# Patient Record
Sex: MALE | Race: WHITE | NOT HISPANIC OR LATINO | Employment: OTHER | URBAN - METROPOLITAN AREA
[De-identification: names, ages, dates, MRNs, and addresses within clinical notes are randomized per-mention and may not be internally consistent; named-entity substitution may affect disease eponyms.]

---

## 2019-03-15 ENCOUNTER — OFFICE VISIT (OUTPATIENT)
Dept: PODIATRY | Facility: CLINIC | Age: 69
End: 2019-03-15
Payer: MEDICARE

## 2019-03-15 VITALS — DIASTOLIC BLOOD PRESSURE: 82 MMHG | HEART RATE: 58 BPM | SYSTOLIC BLOOD PRESSURE: 142 MMHG

## 2019-03-15 DIAGNOSIS — M79.672 FOOT PAIN, LEFT: Primary | ICD-10-CM

## 2019-03-15 DIAGNOSIS — M72.2 PLANTAR FASCIITIS: ICD-10-CM

## 2019-03-15 PROCEDURE — 73630 PR  X-RAY FOOT 3+ VW: ICD-10-PCS | Mod: LT,,, | Performed by: PODIATRIST

## 2019-03-15 PROCEDURE — 73630 X-RAY EXAM OF FOOT: CPT | Mod: LT,,, | Performed by: PODIATRIST

## 2019-03-15 PROCEDURE — 20550 NJX 1 TENDON SHEATH/LIGAMENT: CPT | Mod: LT,,, | Performed by: PODIATRIST

## 2019-03-15 PROCEDURE — 99203 PR OFFICE/OUTPT VISIT, NEW, LEVL III, 30-44 MIN: ICD-10-PCS | Mod: 25,,, | Performed by: PODIATRIST

## 2019-03-15 PROCEDURE — 99203 OFFICE O/P NEW LOW 30 MIN: CPT | Mod: 25,,, | Performed by: PODIATRIST

## 2019-03-15 PROCEDURE — 20550 PR INJECT TENDON SHEATH/LIGAMENT: ICD-10-PCS | Mod: LT,,, | Performed by: PODIATRIST

## 2019-03-15 RX ORDER — ROSUVASTATIN CALCIUM 5 MG/1
5 TABLET, COATED ORAL DAILY
COMMUNITY
End: 2020-02-13 | Stop reason: SDUPTHER

## 2019-03-15 RX ORDER — BUPIVACAINE HYDROCHLORIDE 5 MG/ML
1.5 INJECTION, SOLUTION PERINEURAL
Status: COMPLETED | OUTPATIENT
Start: 2019-03-15 | End: 2019-03-15

## 2019-03-15 RX ORDER — METHYLPREDNISOLONE ACETATE 40 MG/ML
40 INJECTION, SUSPENSION INTRA-ARTICULAR; INTRALESIONAL; INTRAMUSCULAR; SOFT TISSUE
Status: COMPLETED | OUTPATIENT
Start: 2019-03-15 | End: 2019-03-15

## 2019-03-15 RX ORDER — ATORVASTATIN CALCIUM 10 MG/1
10 TABLET, FILM COATED ORAL DAILY
COMMUNITY
End: 2020-02-13

## 2019-03-15 RX ORDER — NAPROXEN 500 MG/1
500 TABLET ORAL 2 TIMES DAILY
Qty: 60 TABLET | Refills: 1 | Status: SHIPPED | OUTPATIENT
Start: 2019-03-15 | End: 2019-04-14

## 2019-03-15 RX ORDER — DEXAMETHASONE SODIUM PHOSPHATE 4 MG/ML
4 INJECTION, SOLUTION INTRA-ARTICULAR; INTRALESIONAL; INTRAMUSCULAR; INTRAVENOUS; SOFT TISSUE
Status: COMPLETED | OUTPATIENT
Start: 2019-03-15 | End: 2019-03-15

## 2019-03-15 RX ADMIN — BUPIVACAINE HYDROCHLORIDE 7.5 MG: 5 INJECTION, SOLUTION PERINEURAL at 05:03

## 2019-03-15 RX ADMIN — METHYLPREDNISOLONE ACETATE 40 MG: 40 INJECTION, SUSPENSION INTRA-ARTICULAR; INTRALESIONAL; INTRAMUSCULAR; SOFT TISSUE at 05:03

## 2019-03-15 RX ADMIN — DEXAMETHASONE SODIUM PHOSPHATE 4 MG: 4 INJECTION, SOLUTION INTRA-ARTICULAR; INTRALESIONAL; INTRAMUSCULAR; INTRAVENOUS; SOFT TISSUE at 05:03

## 2019-03-15 NOTE — PROGRESS NOTES
[address] [facility] [address]  [place] [address]  Phone: [phone]   [phone]    Patient's PCP:Primary Doctor No  Referring Provider: No ref. provider found    Subjective:      Chief Complaint:: Foot Pain (left) and Heel Pain (left)    ANDREA Canchola is a 68 y.o. male who presents with a complaint of left foot/heel pain lasting for about 10 years but off and on. Current symptoms include aching and pain.  Aggravating factors are weight bearing. Treatment to date have included injections that gave relief, but now pain is back and hurts. Patients rates pain 8/10 on pain scale.    Pt thinks PF.        Vitals:    03/15/19 1531   BP: (!) 142/82   Pulse: (!) 58     Shoe Size: 11    Past Surgical History:   Procedure Laterality Date    CHOLECYSTECTOMY      TONSILLECTOMY      UMBILICAL HERNIA REPAIR       Past Medical History:   Diagnosis Date    Depression     Hyperthyroidism     Hypothyroidism      History reviewed. No pertinent family history.     Social History:   Marital Status:   Alcohol History:  has no alcohol history on file.  Tobacco History:  reports that he has quit smoking. He does not have any smokeless tobacco history on file.  Drug History:  has no drug history on file.    Review of patient's allergies indicates:  No Known Allergies    Current Outpatient Medications   Medication Sig Dispense Refill    atorvastatin (LIPITOR) 10 MG tablet Take 10 mg by mouth once daily.      rosuvastatin (CRESTOR) 5 MG tablet Take 5 mg by mouth once daily.      naproxen (NAPROSYN) 500 MG tablet Take 1 tablet (500 mg total) by mouth 2 (two) times daily. 60 tablet 1     Current Facility-Administered Medications   Medication Dose Route Frequency Provider Last Rate Last Dose    bupivacaine 0.5% (5 mg/ml) injection 7.5 mg  1.5 mL Infiltration 1 time in Clinic/HOD Zackary Boyer DPM        dexamethasone injection 4 mg  4 mg Other 1 time in Clinic/HOD Zackary Boyer DPM         methylPREDNISolone acetate injection 40 mg  40 mg Other 1 time in Clinic/HOD Zackary Boyer DPM           Review of Systems      Objective:        Physical Exam:   Foot Exam    General  General Appearance: appears stated age and healthy   Orientation: alert and oriented to person, place, and time   Affect: appropriate   Gait: unimpaired       Left Foot/Ankle      Inspection and Palpation  Ecchymosis: none  Tenderness: plantar fascia   Swelling: none   Arch: pes cavus  Skin Exam: skin intact;     Neurovascular  Dorsalis pedis: 2+  Posterior tibial: 2+  Saphenous nerve sensation: normal  Tibial nerve sensation: normal  Superficial peroneal nerve sensation: normal  Deep peroneal nerve sensation: normal  Sural nerve sensation: normal    Muscle Strength  Ankle dorsiflexion: 5  Ankle plantar flexion: 5  Ankle inversion: 5  Ankle eversion: 5  Great toe extension: 5  Great toe flexion: 5    Range of Motion    Passive  Ankle dorsiflexion: 5, pain    Active  Ankle dorsiflexion: 5, pain    Tests  Calcaneal squeeze: negative     Comments  Pain on palpation of the infeior medial heel. No pain present  with side to side compression of the calcaneus. Negative tinnel's sign  at the tarsal tunnel. Negative Loivera's nerve pain. Negative Calcaneal nerve pain. No soft tissue masses. Pain present with dorsiflexion of the ankle. No edema, erythema, or ecchymosis noted.     Physical Exam   Cardiovascular:   Pulses:       Dorsalis pedis pulses are 2+ on the left side.        Posterior tibial pulses are 2+ on the left side.   Musculoskeletal:        Feet:        Imaging:   X-Ray Foot Complete Left  AP, lateral, lateral oblique, medial oblique weightbearing x-rays left foot:  No fractures, no bone tumors, no soft tissue masses. Inferior and posterior calcaneal exostosis present. Hammertoe deformities 2, 3, 4, 5. Pes cavus foot structure.           Assessment:       1. Foot pain, left    2. Plantar fasciitis - Left Foot      Plan:   Foot  pain, left  -     X-Ray Foot Complete Left  -     naproxen (NAPROSYN) 500 MG tablet; Take 1 tablet (500 mg total) by mouth 2 (two) times daily.  Dispense: 60 tablet; Refill: 1  -     methylPREDNISolone acetate injection 40 mg  -     bupivacaine 0.5% (5 mg/ml) injection 7.5 mg  -     dexamethasone injection 4 mg    Plantar fasciitis - Left Foot  -     naproxen (NAPROSYN) 500 MG tablet; Take 1 tablet (500 mg total) by mouth 2 (two) times daily.  Dispense: 60 tablet; Refill: 1  -     methylPREDNISolone acetate injection 40 mg  -     bupivacaine 0.5% (5 mg/ml) injection 7.5 mg  -     dexamethasone injection 4 mg    Plantar Fasciitis Level I Treatment:   Anti-inflammatory  No bare feet  Over the counter insert  Restrict activity level   Use running shoes at full time  No impact exercise and stretch gastrocnemius muscle  Follow-up if symptoms worsen or fail to improve.    Procedures - A steroid injection was performed at the attachment of the medial central band of the plantar fascia to the medial tubercle of the left calcaneus from a medial approach. using 0.5% plain Marcaine and 4 mg of Decadron phosphate and 40 mg of methylprednisolone. This was well tolerated.    Counseling:     I provided patient education verbally regarding:   Patient diagnosis, treatment options, as well as alternatives, risks, and benefits.   Discussed different treatment options for heel pain. I gave written and verbal instructions on heel cord stretching and this was demonstrated for the patient. Patient expressed understanding. Discussed wearing appropriate shoe gear and avoiding flats, slippers, sandals, barefoot, and sockfeet. Recommended arch supports. My recommendation for OTC supports is Spenco polysorb replacement insoles or patient may elect more aggressive treatment with prescription arch supports. We also discussed cortisone injections and NSAID therapy.   This note was created using Dragon voice recognition software that occasionally  misinterpreted phrases or words.

## 2019-03-15 NOTE — PATIENT INSTRUCTIONS
Plantar Fasciitis  Plantar fasciitis is a painful swelling of the plantar fascia. The plantar fascia is a thick, fibrous layer of tissue. It covers the bones on the bottom of your foot. And it supports the foot bones in an arched position.  This can happen gradually or suddenly. It usually affects one foot at a time. Heel pain can be sharp, like a knife sticking into the bottom of your foot. You may feel pain after exercising, long-distance jogging, stair climbing, long periods of standing, or after standing up.  Risk factors include: non-active lifestyle, arthritis, diabetes, obesity or recent weight gain, flat foot, high arch. Wearing high heels, loose shoes, or shoes with poor arch support for long periods of time adds to the risk. This problem is commonly found in runners and dancers. It also found in people who stand on hard surfaces for long periods of time.  Foot pain from this condition is usually worse in the morning. But it improves with walking. By the end of the day there may be a dull aching. Treatment requires short-term rest and controlling swelling. It may take up to 9 months before all symptoms go away. Rarely, a steroid injection into the foot, or surgery, may be needed.  Home care  · If you are overweight, lose weight to help healing.  · Choose supportive shoes with good arch support and shock absorbency. Replace athletic shoes when they become worn out. Dont walk or run barefoot.  · Premade or custom-fitted shoe inserts may be helpful. Inserts made of silicone seem to be the most effective. Custom-made inserts can be provided by a podiatrist or foot specialist, physical therapist, or orthopedist.  · Premade or custom-made night splints keep the heel stretched out while you sleep. They may prevent morning pain.  · Avoid activities that stress the feet: jogging, prolonged standing or walking, contact sports, etc.  · First thing in the morning and before sports, stretch the bottom of your feet.  Gently flex your ankle so the toes move toward your knee.  · Icing may help control heel pain. Apply an ice pack to the heel for 10-20 minutes as a preventive. Or ice your heel after a severe flare-up of symptoms. You may repeat this every 1-2 hours as needed.  · You may use over-the-counter pain medicine to control pain, unless another medicine was prescribed. Anti-inflammatory pain medicines, such as ibuprofen or naproxen, may work better than acetaminophen. If you have chronic liver or kidney disease or ever had a stomach ulcer or GI bleeding, talk with your healthcare provider before using these medicines.  Follow-up care  Follow up with your healthcare provider, physical therapist, or podiatrist or foot specialist as advised.  Call for an appointment if pain worsens or there is no relief after a few weeks of home treatment. Shoe inserts, a night splint, or a special boot may be required.  If X-rays were taken, you will be told of any new findings that may affect your care.  When to seek medical advice  Call your healthcare provider right away if any of these occur:  · Foot swelling  · Redness with increasing pain  Date Last Reviewed: 11/21/2015  © 0868-4867 [employer]. [address] [place] [address], [place] [address]. All rights reserved. This information is not intended as a substitute for professional medical care. Always follow your healthcare professional's instructions.

## 2020-02-13 ENCOUNTER — OFFICE VISIT (OUTPATIENT)
Dept: FAMILY MEDICINE | Facility: CLINIC | Age: 70
End: 2020-02-13
Payer: MEDICARE

## 2020-02-13 VITALS
HEART RATE: 56 BPM | DIASTOLIC BLOOD PRESSURE: 78 MMHG | SYSTOLIC BLOOD PRESSURE: 128 MMHG | BODY MASS INDEX: 40.93 KG/M2 | HEIGHT: 69 IN | WEIGHT: 276.38 LBS

## 2020-02-13 DIAGNOSIS — Z12.5 SCREENING FOR MALIGNANT NEOPLASM OF PROSTATE: ICD-10-CM

## 2020-02-13 DIAGNOSIS — E78.2 MIXED HYPERLIPIDEMIA: ICD-10-CM

## 2020-02-13 DIAGNOSIS — R79.89 LOW TESTOSTERONE LEVEL IN MALE: ICD-10-CM

## 2020-02-13 DIAGNOSIS — R21 RASH OF HANDS: ICD-10-CM

## 2020-02-13 DIAGNOSIS — E66.01 CLASS 3 SEVERE OBESITY WITH BODY MASS INDEX (BMI) OF 40.0 TO 44.9 IN ADULT, UNSPECIFIED OBESITY TYPE, UNSPECIFIED WHETHER SERIOUS COMORBIDITY PRESENT: ICD-10-CM

## 2020-02-13 DIAGNOSIS — M19.90 ARTHRITIS: ICD-10-CM

## 2020-02-13 DIAGNOSIS — I10 ESSENTIAL HYPERTENSION: Primary | ICD-10-CM

## 2020-02-13 DIAGNOSIS — F32.5 MAJOR DEPRESSIVE DISORDER IN FULL REMISSION, UNSPECIFIED WHETHER RECURRENT: ICD-10-CM

## 2020-02-13 PROBLEM — E66.813 CLASS 3 SEVERE OBESITY WITH BODY MASS INDEX (BMI) OF 40.0 TO 44.9 IN ADULT: Status: ACTIVE | Noted: 2020-02-13

## 2020-02-13 PROCEDURE — 99205 PR OFFICE/OUTPT VISIT, NEW, LEVL V, 60-74 MIN: ICD-10-PCS | Mod: S$GLB,,, | Performed by: NURSE PRACTITIONER

## 2020-02-13 PROCEDURE — 99205 OFFICE O/P NEW HI 60 MIN: CPT | Mod: S$GLB,,, | Performed by: NURSE PRACTITIONER

## 2020-02-13 RX ORDER — NAPROXEN 500 MG/1
500 TABLET ORAL 2 TIMES DAILY WITH MEALS
COMMUNITY
End: 2020-08-13 | Stop reason: SDUPTHER

## 2020-02-13 RX ORDER — SERTRALINE HYDROCHLORIDE 50 MG/1
50 TABLET, FILM COATED ORAL NIGHTLY
COMMUNITY
End: 2020-02-13 | Stop reason: SDUPTHER

## 2020-02-13 RX ORDER — HYDROGEN PEROXIDE 3 %
20 SOLUTION, NON-ORAL MISCELLANEOUS
COMMUNITY
End: 2022-02-18 | Stop reason: SDUPTHER

## 2020-02-13 RX ORDER — LISINOPRIL 40 MG/1
40 TABLET ORAL DAILY
COMMUNITY
End: 2020-02-13 | Stop reason: SDUPTHER

## 2020-02-13 RX ORDER — SERTRALINE HYDROCHLORIDE 50 MG/1
50 TABLET, FILM COATED ORAL NIGHTLY
Qty: 90 TABLET | Refills: 1 | Status: SHIPPED | OUTPATIENT
Start: 2020-02-13 | End: 2020-05-11 | Stop reason: SDUPTHER

## 2020-02-13 RX ORDER — MELOXICAM 15 MG/1
15 TABLET ORAL DAILY
Qty: 90 TABLET | Refills: 1 | Status: SHIPPED | OUTPATIENT
Start: 2020-02-13 | End: 2020-08-13 | Stop reason: SDUPTHER

## 2020-02-13 RX ORDER — ROSUVASTATIN CALCIUM 5 MG/1
5 TABLET, COATED ORAL DAILY
Qty: 90 TABLET | Refills: 1 | Status: SHIPPED | OUTPATIENT
Start: 2020-02-13 | End: 2020-02-14

## 2020-02-13 RX ORDER — LISINOPRIL 40 MG/1
40 TABLET ORAL DAILY
Qty: 90 TABLET | Refills: 1 | Status: SHIPPED | OUTPATIENT
Start: 2020-02-13 | End: 2020-08-13 | Stop reason: SDUPTHER

## 2020-02-13 RX ORDER — MELOXICAM 15 MG/1
15 TABLET ORAL DAILY
COMMUNITY
End: 2020-02-13 | Stop reason: SDUPTHER

## 2020-02-13 NOTE — PROGRESS NOTES
SUBJECTIVE:    Patient ID: Ciaran Canchola is a 69 y.o. male.    Chief Complaint: Establish Care (Brought bottles.....mlr)    Pt presents to establish new care. Has lived in the area off and on for several years but officially moved here from [place] about 9 months ago. His job required a lot of traveling. 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 History includes HTN, HLD, arthritis, GERD. Has also had some family issues with his daughter in the past in which he was placed of sertraline. States he is doing well with this. Has not had blood work completed in about one year. States he has had chronically low testosterone in the past. Was previously being treated for it but has not had treatment in over a year. Would like testosterone checked with routine labs today. Recently saw Dr. Boyer where he was diagnosed with plantar fasciitis. Had colonoscopy 2 years ago in [place]. Cannot remember the name of the doctor but states he does have the report at home. RTC is supposed to be 5 years as he had some polyps removed. Takes otc nexium daily and feels it works well for him. Reports he has a peeling skin/rash on both his hands that has been coming and going for about the last 6 months. Saw a Dermatology NP in [place] and was given a cream he is unsure the name of. Cream was very expensive so he called back to see if there were any other options but never heard back from the office. He has been using aquafor to keep his palms well-moisturized but rash continues. States he takes otc allergy medication daily and has not had any sinus issues.  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 States he has not had the healthiest diet over the years d/t traveling frequently and having to eat out a lot.       No visits with results within 6 Month(s) from this visit.   Latest known visit with results is:   No results found for any previous visit.       Past Medical  History:   Diagnosis Date    Depression     Hyperlipidemia      Past Surgical History:   Procedure Laterality Date    CHOLECYSTECTOMY      COLONOSCOPY  2018    RTC in 5 years. done in [place]    TONSILLECTOMY      UMBILICAL HERNIA REPAIR       Family History   Problem Relation Age of Onset    ALS Mother     Heart disease Father     Heart disease Sister        Marital Status:   Alcohol History:  reports that he drinks alcohol.  Tobacco History:  reports that he quit smoking about 35 years ago. His smoking use included cigarettes. He has never used smokeless tobacco.  Drug History:  reports that he does not use drugs.    Review of patient's allergies indicates:  No Known Allergies    Current Outpatient Medications:     esomeprazole (NEXIUM) 20 MG capsule, Take 20 mg by mouth before breakfast., Disp: , Rfl:     lisinopril (PRINIVIL,ZESTRIL) 40 MG tablet, Take 1 tablet (40 mg total) by mouth once daily., Disp: 90 tablet, Rfl: 1    meloxicam (MOBIC) 15 MG tablet, Take 1 tablet (15 mg total) by mouth once daily., Disp: 90 tablet, Rfl: 1    naproxen (NAPROSYN) 500 MG tablet, Take 500 mg by mouth 2 (two) times daily with meals., Disp: , Rfl:     rosuvastatin (CRESTOR) 5 MG tablet, Take 1 tablet (5 mg total) by mouth once daily., Disp: 90 tablet, Rfl: 1    sertraline (ZOLOFT) 50 MG tablet, Take 1 tablet (50 mg total) by mouth every evening., Disp: 90 tablet, Rfl: 1    Review of Systems   Constitutional: Negative.    HENT: Negative for congestion, ear pain, sinus pressure, tinnitus and trouble swallowing.    Eyes: Negative for pain and redness.   Respiratory: Negative for cough, chest tightness and shortness of breath.    Cardiovascular: Negative for chest pain and palpitations.   Gastrointestinal: Negative for abdominal pain and nausea.   Genitourinary: Negative for dysuria, frequency and urgency.   Musculoskeletal: Positive for arthralgias (occasional). Negative for back pain and myalgias.   Skin:  "Positive for rash (peeling skin on hands). Negative for wound.   Allergic/Immunologic: Negative.    Neurological: Positive for headaches. Negative for dizziness, weakness and light-headedness.   Psychiatric/Behavioral: Negative.           Objective:      Vitals:    02/13/20 1409   BP: 128/78   Pulse: (!) 56   Weight: 125.4 kg (276 lb 6.4 oz)   Height: 5' 9" (1.753 m)     Body mass index is 40.82 kg/m².  Physical Exam   Constitutional: He is oriented to person, place, and time. He appears well-developed and well-nourished. No distress.   Overweight male. Appears age   HENT:   Head: Normocephalic and atraumatic.   Right Ear: Tympanic membrane normal.   Left Ear: Tympanic membrane normal.   Nose: Nose normal. No mucosal edema or nasal deformity.   Mouth/Throat: Oropharynx is clear and moist and mucous membranes are normal. No dental abscesses or dental caries.   Eyes: Pupils are equal, round, and reactive to light. Conjunctivae, EOM and lids are normal.   Neck: Normal range of motion. No tracheal tenderness present. No thyromegaly present.   Cardiovascular: Normal rate, regular rhythm and normal heart sounds.   No murmur heard.  Pulmonary/Chest: Effort normal and breath sounds normal. No accessory muscle usage. No respiratory distress. He has no rhonchi.   Abdominal: Soft. Normal appearance and bowel sounds are normal. There is no tenderness.   Musculoskeletal: Normal range of motion. He exhibits no tenderness.   Neurological: He is alert and oriented to person, place, and time.   Skin: Skin is warm and dry. Capillary refill takes less than 2 seconds. Rash (dry/cracked skin covering palms of both hands. Some areas of skin splitting.) noted. No bruising and no ecchymosis noted. No erythema.   Psychiatric: He has a normal mood and affect. Cognition and memory are normal.   Vitals reviewed.        Assessment:       1. Essential hypertension    2. Arthritis    3. Mixed hyperlipidemia    4. Screening for malignant neoplasm " of prostate    5. Major depressive disorder in full remission, unspecified whether recurrent    6. Rash of hands    7. Low testosterone level in male    8. Class 3 severe obesity with body mass index (BMI) of 40.0 to 44.9 in adult, unspecified obesity type, unspecified whether serious comorbidity present         Plan:       Essential hypertension  -     CBC auto differential; Future; Expected date: 02/13/2020  -     Comprehensive metabolic panel; Future; Expected date: 02/13/2020  -     Urinalysis Microscopic; Future  -     lisinopril (PRINIVIL,ZESTRIL) 40 MG tablet; Take 1 tablet (40 mg total) by mouth once daily.  Dispense: 90 tablet; Refill: 1  - Bp well-controlled at his time.    Arthritis  -     meloxicam (MOBIC) 15 MG tablet; Take 1 tablet (15 mg total) by mouth once daily.  Dispense: 90 tablet; Refill: 1    Mixed hyperlipidemia  -     Lipid panel; Future; Expected date: 02/13/2020  -     TSH; Future; Expected date: 02/13/2020  -     rosuvastatin (CRESTOR) 5 MG tablet; Take 1 tablet (5 mg total) by mouth once daily.  Dispense: 90 tablet; Refill: 1    Screening for malignant neoplasm of prostate  -     PSA, Screening; Future; Expected date: 02/13/2020    Major depressive disorder in full remission, unspecified whether recurrent  -     sertraline (ZOLOFT) 50 MG tablet; Take 1 tablet (50 mg total) by mouth every evening.  Dispense: 90 tablet; Refill: 1    Rash of hands  -     Ambulatory referral/consult to Dermatology; Future; Expected date: 02/20/2020    Low testosterone level in male  -     Testosterone, Total, Males; Future; Expected date: 02/13/2020    Class 3 severe obesity with body mass index (BMI) of 40.0 to 44.9 in adult, unspecified obesity type, unspecified whether serious comorbidity present  - Discussed with patient the importance of getting diet under control.    Follow up in about 6 months (around 8/13/2020) for wellness. medication check.

## 2020-02-14 ENCOUNTER — TELEPHONE (OUTPATIENT)
Dept: FAMILY MEDICINE | Facility: CLINIC | Age: 70
End: 2020-02-14

## 2020-02-14 LAB
ALBUMIN SERPL-MCNC: 4.2 G/DL (ref 3.6–5.1)
ALBUMIN/GLOB SERPL: 1.4 (CALC) (ref 1–2.5)
ALP SERPL-CCNC: 51 U/L (ref 35–144)
ALT SERPL-CCNC: 51 U/L (ref 9–46)
AST SERPL-CCNC: 44 U/L (ref 10–35)
BACTERIA #/AREA URNS HPF: NORMAL /HPF
BASOPHILS # BLD AUTO: 57 CELLS/UL (ref 0–200)
BASOPHILS NFR BLD AUTO: 0.7 %
BILIRUB SERPL-MCNC: 0.6 MG/DL (ref 0.2–1.2)
BUN SERPL-MCNC: 16 MG/DL (ref 7–25)
BUN/CREAT SERPL: ABNORMAL (CALC) (ref 6–22)
CALCIUM SERPL-MCNC: 9.3 MG/DL (ref 8.6–10.3)
CHLORIDE SERPL-SCNC: 104 MMOL/L (ref 98–110)
CHOLEST SERPL-MCNC: 122 MG/DL
CHOLEST/HDLC SERPL: 3 (CALC)
CO2 SERPL-SCNC: 27 MMOL/L (ref 20–32)
CREAT SERPL-MCNC: 0.77 MG/DL (ref 0.7–1.25)
EOSINOPHIL # BLD AUTO: 421 CELLS/UL (ref 15–500)
EOSINOPHIL NFR BLD AUTO: 5.2 %
ERYTHROCYTE [DISTWIDTH] IN BLOOD BY AUTOMATED COUNT: 13 % (ref 11–15)
GFRSERPLBLD MDRD-ARVRAT: 93 ML/MIN/1.73M2
GLOBULIN SER CALC-MCNC: 3.1 G/DL (CALC) (ref 1.9–3.7)
GLUCOSE SERPL-MCNC: 102 MG/DL (ref 65–99)
HCT VFR BLD AUTO: 42.2 % (ref 38.5–50)
HDLC SERPL-MCNC: 41 MG/DL
HGB BLD-MCNC: 13.9 G/DL (ref 13.2–17.1)
HYALINE CASTS #/AREA URNS LPF: NORMAL /LPF
LDLC SERPL CALC-MCNC: 56 MG/DL (CALC)
LYMPHOCYTES # BLD AUTO: 1385 CELLS/UL (ref 850–3900)
LYMPHOCYTES NFR BLD AUTO: 17.1 %
MCH RBC QN AUTO: 29.6 PG (ref 27–33)
MCHC RBC AUTO-ENTMCNC: 32.9 G/DL (ref 32–36)
MCV RBC AUTO: 89.8 FL (ref 80–100)
MONOCYTES # BLD AUTO: 664 CELLS/UL (ref 200–950)
MONOCYTES NFR BLD AUTO: 8.2 %
NEUTROPHILS # BLD AUTO: 5573 CELLS/UL (ref 1500–7800)
NEUTROPHILS NFR BLD AUTO: 68.8 %
NONHDLC SERPL-MCNC: 81 MG/DL (CALC)
PLATELET # BLD AUTO: 225 THOUSAND/UL (ref 140–400)
PMV BLD REES-ECKER: 11 FL (ref 7.5–12.5)
POTASSIUM SERPL-SCNC: 4.5 MMOL/L (ref 3.5–5.3)
PROT SERPL-MCNC: 7.3 G/DL (ref 6.1–8.1)
PSA SERPL-MCNC: 0.9 NG/ML
RBC # BLD AUTO: 4.7 MILLION/UL (ref 4.2–5.8)
RBC #/AREA URNS HPF: NORMAL /HPF
SODIUM SERPL-SCNC: 139 MMOL/L (ref 135–146)
SQUAMOUS #/AREA URNS HPF: NORMAL /HPF
TESTOST SERPL-MCNC: 50 NG/DL (ref 250–827)
TRIGL SERPL-MCNC: 175 MG/DL
TSH SERPL-ACNC: 1.82 MIU/L (ref 0.4–4.5)
WBC # BLD AUTO: 8.1 THOUSAND/UL (ref 3.8–10.8)
WBC #/AREA URNS HPF: NORMAL /HPF

## 2020-02-14 NOTE — TELEPHONE ENCOUNTER
----- Message from Bushra Harmon NP sent at 2/14/2020 10:53 AM CST -----  Blood counts are good. Thyroid is normal and prostate levels are normal. Kidney function is normal. Liver enzymes are a little elevated indicating possibility of some liver dysfunction. Currently on rosuvastatin for cholesterol control. Although your cholesterol is well-controlled, this medication may be having a negative affect on your liver. It could also be the cause of your leg cramps. We would like to stop your rosuvastatin for 2 months and then repeat CMP lab. If liver enzymes are still elevated, we will consider other causes. If they normalize, we will need to look at trying a medication that is not as hard on your liver.   - Please set CMP remind me for 2 months from now.

## 2020-02-14 NOTE — PROGRESS NOTES
Spoke to patient who understood the message.  He will d/c the rosuvastatin x 2   Months. Noted in meds list.  Remind Me created for CMP in April/ba

## 2020-02-18 ENCOUNTER — TELEPHONE (OUTPATIENT)
Dept: FAMILY MEDICINE | Facility: CLINIC | Age: 70
End: 2020-02-18

## 2020-02-18 ENCOUNTER — OFFICE VISIT (OUTPATIENT)
Dept: DERMATOLOGY | Facility: CLINIC | Age: 70
End: 2020-02-18
Payer: MEDICARE

## 2020-02-18 VITALS — RESPIRATION RATE: 18 BRPM | BODY MASS INDEX: 40.94 KG/M2 | WEIGHT: 276.44 LBS | HEIGHT: 69 IN

## 2020-02-18 DIAGNOSIS — L30.9 HAND ECZEMA: Primary | ICD-10-CM

## 2020-02-18 DIAGNOSIS — I87.8 VENOUS STASIS: ICD-10-CM

## 2020-02-18 DIAGNOSIS — I87.2 STASIS DERMATITIS OF BOTH LEGS: ICD-10-CM

## 2020-02-18 PROCEDURE — 99203 PR OFFICE/OUTPT VISIT, NEW, LEVL III, 30-44 MIN: ICD-10-PCS | Mod: S$PBB,,, | Performed by: DERMATOLOGY

## 2020-02-18 PROCEDURE — 99999 PR PBB SHADOW E&M-NEW PATIENT-LVL III: CPT | Mod: PBBFAC,,, | Performed by: DERMATOLOGY

## 2020-02-18 PROCEDURE — 99203 OFFICE O/P NEW LOW 30 MIN: CPT | Mod: PBBFAC,PO | Performed by: DERMATOLOGY

## 2020-02-18 PROCEDURE — 99203 OFFICE O/P NEW LOW 30 MIN: CPT | Mod: S$PBB,,, | Performed by: DERMATOLOGY

## 2020-02-18 PROCEDURE — 99999 PR PBB SHADOW E&M-NEW PATIENT-LVL III: ICD-10-PCS | Mod: PBBFAC,,, | Performed by: DERMATOLOGY

## 2020-02-18 RX ORDER — CLOBETASOL PROPIONATE 0.5 MG/G
CREAM TOPICAL 2 TIMES DAILY
Qty: 60 G | Refills: 0 | Status: SHIPPED | OUTPATIENT
Start: 2020-02-18 | End: 2020-03-17 | Stop reason: SDUPTHER

## 2020-02-18 NOTE — TELEPHONE ENCOUNTER
----- Message from Bushra Harmon NP sent at 2/18/2020  2:41 PM CST -----  Testosterone level is low and warrants treatment. We can do injections every 2 weeks. Or we can do patches or creams. Patches and creams tend to be a bit more expensive. Choice is patient's preference.

## 2020-02-18 NOTE — PROGRESS NOTES
Testosterone level is low and warrants treatment. We can do injections every 2 weeks. Or we can do patches or creams. Patches and creams tend to be a bit more expensive. Choice is patient's preference.

## 2020-02-18 NOTE — TELEPHONE ENCOUNTER
Spoke to patient with results verbatim per Bushra. States that he can't do anything right now because insurance doesn't kick back in until March. Said he prefers patch, but would like it sent in March 1st when he has insurance. FYI to Bushra then send back to Mary to create remind me.

## 2020-02-18 NOTE — PATIENT INSTRUCTIONS
> vaseline or coconut oil for moisturizer to hands and legs  > compression socks   > elevate legs

## 2020-02-18 NOTE — PROGRESS NOTES
Subjective:       Patient ID:  Ciaran Canchola is a 69 y.o. male who presents for   Chief Complaint   Patient presents with    Rash     Present for initial visit.  C/o rash to bilat hands/palms recurring over last 6 months. Has splitting and peeling to fingers at present time. Seen by dermatologist in [place] previously for issue. Treating with aquaphor and A&D oint to hands- keep moisturized, but does not improve issue.     Also has pimple like lesion to R wrist- states lesions have been appearing over last 3 months. Treating with Hibiclens.    No phx of NMSC     Past Medical History:  No date: Depression  No date: Hyperlipidemia        Review of Systems   Constitutional: Negative for fever and chills.   HENT: Negative for sore throat.    Respiratory: Negative for cough.    Gastrointestinal: Negative for nausea and vomiting.   Skin: Positive for itching, rash and dry skin.        Objective:    Physical Exam   Constitutional: He appears well-developed and well-nourished.   Eyes: No conjunctival no injection.   Cardiovascular: There is dependent edema.     Neurological: He is alert and oriented to person, place, and time.   Psychiatric: He has a normal mood and affect.   Skin:   Areas Examined (abnormalities noted in diagram):   RLE Inspected  LLE Inspection Performed  Nails and Digits Inspection Performed                  Diagram Legend     Erythematous scaling macule/papule c/w actinic keratosis       Vascular papule c/w angioma      Pigmented verrucoid papule/plaque c/w seborrheic keratosis      Yellow umbilicated papule c/w sebaceous hyperplasia      Irregularly shaped tan macule c/w lentigo     1-2 mm smooth white papules consistent with Milia      Movable subcutaneous cyst with punctum c/w epidermal inclusion cyst      Subcutaneous movable cyst c/w pilar cyst      Firm pink to brown papule c/w dermatofibroma      Pedunculated fleshy papule(s) c/w skin tag(s)      Evenly pigmented macule c/w junctional  nevus     Mildly variegated pigmented, slightly irregular-bordered macule c/w mildly atypical nevus      Flesh colored to evenly pigmented papule c/w intradermal nevus       Pink pearly papule/plaque c/w basal cell carcinoma      Erythematous hyperkeratotic cursted plaque c/w SCC      Surgical scar with no sign of skin cancer recurrence      Open and closed comedones      Inflammatory papules and pustules      Verrucoid papule consistent consistent with wart     Erythematous eczematous patches and plaques     Dystrophic onycholytic nail with subungual debris c/w onychomycosis     Umbilicated papule    Erythematous-base heme-crusted tan verrucoid plaque consistent with inflamed seborrheic keratosis     Erythematous Silvery Scaling Plaque c/w Psoriasis     See annotation      Assessment / Plan:        Hand eczema  -     clobetasoL (TEMOVATE) 0.05 % cream; Apply topically 2 (two) times daily. Stop when rash is gone and skin is smooth and not itchy.  Dispense: 60 g; Refill: 0  Discussed with patient the etiology and pathogenesis of the disease or skin lesion(s) and possible treatments and aggravators.    Reviewed with patient different treatment options and associated risks.  Brochure given for patient education.  Proper application of medications and or care for affected area(s) and condition(s) reviewed.  Written instructions provided to the patient or guardian today.  Discussed with patient the risks of topical steroids, including, but not limited, to atrophy, rosacea, acne, glaucoma, cataracts, adrenal suppression, striae.  Discussed with patient to use organic coconut oil or pure shea butter at least daily for moisturization for the body and organic jojoba oil at least daily for the face.  Instructed patient to use petroleum jelly at least daily on affected areas.  No hot water bathing reviewed.  Recommended more hand  than water washing for routine germ prevention.    Venous stasis  Discussed with patient  need to wear compression stockings and to elevate legs regularly, especially with sitting.  Patient to consider elevating legs during sleep if no pain in the toes or numbness.  Patient can consider using ace wrap in lieu of stockings but has to watch out for bluing of the toes or pain or numbness in the toes.  Patient may need evaluation with their primary or cardiologist for leg swelling.  Patient to ambulate regularly and exercise a few times a week if possible.  Patient to consider inversion table or lying down on the floor and propping up legs on a stool or cushions as a daily intervention.    Stasis dermatitis of both legs  -     clobetasoL (TEMOVATE) 0.05 % cream; Apply topically 2 (two) times daily. Stop when rash is gone and skin is smooth and not itchy.  Dispense: 60 g; Refill: 0  Reviewed with patient different treatment options and associated risks.  Proper application of medications and or care for affected area(s) and condition(s) reviewed.  Written instructions provided to the patient or guardian today.  Discussed with patient need to wear compression stockings and to elevate legs regularly, especially with sitting.  Patient to consider elevating legs during sleep if no pain in the toes or numbness.  Patient can consider using ace wrap in lieu of stockings but has to watch out for bluing of the toes or pain or numbness in the toes.  Patient may need evaluation with their primary or cardiologist for leg swelling.  Patient to ambulate regularly and exercise a few times a week if possible.  Patient to consider inversion table or lying down on the floor and propping up legs on a stool or cushions as a daily intervention.             Follow up in about 4 weeks (around 3/17/2020).

## 2020-02-18 NOTE — LETTER
February 18, 2020      Bushra Harmon, NP  [address]  [address]  [facility] [address]           [address], [address]  [facility] [address]  Phone: [phone]          Patient: Ciaran Canchola   MR Number: [id number]   YOB: 1950   Date of Visit: 2/18/2020       Dear Bushra Harmon:    Thank you for referring Ciaran Canchola to me for evaluation. Attached you will find relevant portions of my assessment and plan of care.    If you have questions, please do not hesitate to call me. I look forward to following Ciaran Canchola along with you.    Sincerely,    Levy Bragg MD    Enclosure  CC:  No Recipients    If you would like to receive this communication electronically, please contact [email or web address] or [phone] to request more information on [facility] access.    For providers and/or their staff who would like to refer a patient to [facility], please contact us through our one-stop-shop provider referral line, [facility], at [phone].    If you feel you have received this communication in error or would no longer like to receive these types of communications, please e-mail [email or web address]

## 2020-03-03 ENCOUNTER — TELEPHONE (OUTPATIENT)
Dept: FAMILY MEDICINE | Facility: CLINIC | Age: 70
End: 2020-03-03

## 2020-03-03 DIAGNOSIS — R79.89 LOW TESTOSTERONE LEVEL IN MALE: Primary | ICD-10-CM

## 2020-03-03 NOTE — TELEPHONE ENCOUNTER
----- Message from Mary Ghassan, RT sent at 2/18/2020  3:32 PM CST -----  Regarding: Send in testosterone/call pt  You 6 minutes ago (3:24 PM)  Spoke to patient with results verbatim per Bushra. States that he can't do anything right now because insurance doesn't kick back in until March. Said he prefers patch, but would like it sent in March 1st when he has insurance. FYI to Bushra then send back to Mary to create remind me.     -- Message from Bushra Harmon NP sent at 2/18/2020  2:41 PM CST -----  Testosterone level is low and warrants treatment. We can do injections every 2 weeks. Or we can do patches or creams. Patches and creams tend to be a bit more expensive. Choice is patient's preference

## 2020-03-03 NOTE — TELEPHONE ENCOUNTER
Spoke to patient to confirm that he wants testosterone patches called in. He said this is the route he would like to go. Allergies and pharmacy verified. Please send rx in. Would you like to repeat lab? FYSASKIA, this is a follow-up from labs drawn a few weeks ago. Patient wanted to wait for new insurance to kick in before we sent rx.

## 2020-03-03 NOTE — TELEPHONE ENCOUNTER
From [facility]-testosterone rx to be sent to pharmacy. See below correspondence. LMOR for patient to call me back so that I can verify allergies and pharmacy.

## 2020-03-17 ENCOUNTER — OFFICE VISIT (OUTPATIENT)
Dept: DERMATOLOGY | Facility: CLINIC | Age: 70
End: 2020-03-17
Payer: MEDICARE

## 2020-03-17 VITALS — HEIGHT: 69 IN | RESPIRATION RATE: 18 BRPM | WEIGHT: 276.44 LBS | BODY MASS INDEX: 40.94 KG/M2

## 2020-03-17 DIAGNOSIS — I87.8 VENOUS STASIS: Primary | ICD-10-CM

## 2020-03-17 DIAGNOSIS — I87.2 STASIS DERMATITIS OF BOTH LEGS: ICD-10-CM

## 2020-03-17 DIAGNOSIS — L30.9 HAND ECZEMA: ICD-10-CM

## 2020-03-17 PROCEDURE — 99213 OFFICE O/P EST LOW 20 MIN: CPT | Mod: S$PBB,,, | Performed by: DERMATOLOGY

## 2020-03-17 PROCEDURE — 99999 PR PBB SHADOW E&M-EST. PATIENT-LVL III: ICD-10-PCS | Mod: PBBFAC,,, | Performed by: DERMATOLOGY

## 2020-03-17 PROCEDURE — 99999 PR PBB SHADOW E&M-EST. PATIENT-LVL III: CPT | Mod: PBBFAC,,, | Performed by: DERMATOLOGY

## 2020-03-17 PROCEDURE — 99213 OFFICE O/P EST LOW 20 MIN: CPT | Mod: PBBFAC,PO | Performed by: DERMATOLOGY

## 2020-03-17 PROCEDURE — 99213 PR OFFICE/OUTPT VISIT, EST, LEVL III, 20-29 MIN: ICD-10-PCS | Mod: S$PBB,,, | Performed by: DERMATOLOGY

## 2020-03-17 RX ORDER — CLOBETASOL PROPIONATE 0.5 MG/G
CREAM TOPICAL 2 TIMES DAILY
Qty: 60 G | Refills: 1 | Status: SHIPPED | OUTPATIENT
Start: 2020-03-17 | End: 2022-08-24

## 2020-03-17 NOTE — PROGRESS NOTES
Subjective:       Patient ID:  Ciaran Canchola is a 69 y.o. male who presents for   Chief Complaint   Patient presents with    Follow-up     HPI  Last OV : 02-   Hand eczema  -     clobetasoL (TEMOVATE) 0.05 % cream; Apply topically 2 (two) times daily. Stop when rash is gone and skin is smooth and not itchy.  Dispense: 60 g; Refill: 0  Discussed with patient the etiology and pathogenesis of the disease or skin lesion(s) and possible treatments and aggravators.    Reviewed with patient different treatment options and associated risks.  Brochure given for patient education.  Proper application of medications and or care for affected area(s) and condition(s) reviewed.  Written instructions provided to the patient or guardian today.  Discussed with patient the risks of topical steroids, including, but not limited, to atrophy, rosacea, acne, glaucoma, cataracts, adrenal suppression, striae.  Discussed with patient to use organic coconut oil or pure shea butter at least daily for moisturization for the body and organic jojoba oil at least daily for the face.  Instructed patient to use petroleum jelly at least daily on affected areas.  No hot water bathing reviewed.  Recommended more hand  than water washing for routine germ prevention.     Venous stasis  Discussed with patient need to wear compression stockings and to elevate legs regularly, especially with sitting.  Patient to consider elevating legs during sleep if no pain in the toes or numbness.  Patient can consider using ace wrap in lieu of stockings but has to watch out for bluing of the toes or pain or numbness in the toes.  Patient may need evaluation with their primary or cardiologist for leg swelling.  Patient to ambulate regularly and exercise a few times a week if possible.  Patient to consider inversion table or lying down on the floor and propping up legs on a stool or cushions as a daily intervention.     Stasis dermatitis of both  legs  -     clobetasoL (TEMOVATE) 0.05 % cream; Apply topically 2 (two) times daily. Stop when rash is gone and skin is smooth and not itchy.  Dispense: 60 g; Refill: 0  Reviewed with patient different treatment options and associated risks.  Proper application of medications and or care for affected area(s) and condition(s) reviewed.  Written instructions provided to the patient or guardian today.  Discussed with patient need to wear compression stockings and to elevate legs regularly, especially with sitting.  Patient to consider elevating legs during sleep if no pain in the toes or numbness.  Patient can consider using ace wrap in lieu of stockings but has to watch out for bluing of the toes or pain or numbness in the toes.  Patient may need evaluation with their primary or cardiologist for leg swelling.  Patient to ambulate regularly and exercise a few times a week if possible.  Patient to consider inversion table or lying down on the floor and propping up legs on a stool or cushions as a daily intervention    69 y.o. male who presents for a follow up appointment for his Eczema on both hands and Stasis dermatitis. He states both are better.   He has been wearing his support hose for his stasis derm and using the clobetasol.     *Out of clobetasol cream but does help when using*    No phx of NMSC     Review of Systems   Constitutional: Negative for fever and chills.   HENT: Negative for sore throat.    Respiratory: Negative for cough.    Gastrointestinal: Negative for nausea and vomiting.   Skin: Positive for itching, rash and dry skin.       Past Medical History:   Diagnosis Date    Depression     Hyperlipidemia          Objective:    Physical Exam   Constitutional: He appears well-developed and well-nourished. No distress.   Cardiovascular: There is no local extremity swelling and no dependent edema.     Neurological: He is alert and oriented to person, place, and time. He is not disoriented.   Psychiatric:  He has a normal mood and affect. He is not agitated.   Skin:   Areas Examined (abnormalities noted in diagram):   RLE Inspected  LLE Inspection Performed  Nails and Digits Inspection Performed                  Diagram Legend     Erythematous scaling macule/papule c/w actinic keratosis       Vascular papule c/w angioma      Pigmented verrucoid papule/plaque c/w seborrheic keratosis      Yellow umbilicated papule c/w sebaceous hyperplasia      Irregularly shaped tan macule c/w lentigo     1-2 mm smooth white papules consistent with Milia      Movable subcutaneous cyst with punctum c/w epidermal inclusion cyst      Subcutaneous movable cyst c/w pilar cyst      Firm pink to brown papule c/w dermatofibroma      Pedunculated fleshy papule(s) c/w skin tag(s)      Evenly pigmented macule c/w junctional nevus     Mildly variegated pigmented, slightly irregular-bordered macule c/w mildly atypical nevus      Flesh colored to evenly pigmented papule c/w intradermal nevus       Pink pearly papule/plaque c/w basal cell carcinoma      Erythematous hyperkeratotic cursted plaque c/w SCC      Surgical scar with no sign of skin cancer recurrence      Open and closed comedones      Inflammatory papules and pustules      Verrucoid papule consistent consistent with wart     Erythematous eczematous patches and plaques     Dystrophic onycholytic nail with subungual debris c/w onychomycosis     Umbilicated papule    Erythematous-base heme-crusted tan verrucoid plaque consistent with inflamed seborrheic keratosis     Erythematous Silvery Scaling Plaque c/w Psoriasis     See annotation      Assessment / Plan:        Venous stasis  Condition is stable.  We will continue present management.  Patient to watch for recurrence or flares or worsening and to call the clinic for a follow up appointment for such.    Hand eczema  -     clobetasoL (TEMOVATE) 0.05 % cream; Apply topically 2 (two) times daily. Stop when rash is gone and skin is smooth and  not itchy.  Dispense: 60 g; Refill: 1  Better.  Condition is stable.  We will continue present management.  Patient to watch for recurrence or flares or worsening and to call the clinic for a follow up appointment for such.  Reviewed with patient different treatment options and associated risks.  Add vit d oil (oral) on top of clob for stubborn patches.  Proper application of medications and or care for affected area(s) and condition(s) reviewed.    Stasis dermatitis of both legs  -     clobetasoL (TEMOVATE) 0.05 % cream; Apply topically 2 (two) times daily. Stop when rash is gone and skin is smooth and not itchy.  Dispense: 60 g; Refill: 1  Resolved.  Patient to contact us for earlier follow up if anything recurs.  Patient and or guardian to monitor this area/lesion or these areas/lesions for changes or worsening.  Patient and or guardian to contact us if any changes are noted for such.             Follow up in about 8 months (around 11/17/2020).

## 2020-04-09 ENCOUNTER — TELEPHONE (OUTPATIENT)
Dept: FAMILY MEDICINE | Facility: CLINIC | Age: 70
End: 2020-04-09

## 2020-04-09 NOTE — TELEPHONE ENCOUNTER
There should already be a remind me for CBC, testosterone, and PSA to be done end of May/ early June. Can we push the CMP to be done at that time?

## 2020-04-09 NOTE — TELEPHONE ENCOUNTER
----- Message from Sandi Parada sent at 4/9/2020  9:23 AM CDT -----  Regarding: CMP needed now?/ba  Do I call her to come in or postpone for OV in July?/ba    ----- Message -----  From: Sandi Parada  Sent: 4/9/2020  To: Sandi Parada    Notes recorded by Bushra Harmon NP on 2/14/2020 at 10:53 AM CST  Blood counts are good. Thyroid is normal and prostate levels are normal. Kidney function is normal. Liver enzymes are a little elevated indicating possibility of some liver dysfunction. Currently on rosuvastatin for cholesterol control. Although your cholesterol is well-controlled, this medication may be having a negative affect on your liver. It could also be the cause of your leg cramps. We would like to stop your rosuvastatin for 2 months and then repeat CMP lab. If liver enzymes are still elevated, we will consider other causes. If they normalize, we will need to look at trying a medication that is not as hard on your liver.   - Please set CMP remind me for 2 months from now.

## 2020-05-06 ENCOUNTER — TELEPHONE (OUTPATIENT)
Dept: FAMILY MEDICINE | Facility: CLINIC | Age: 70
End: 2020-05-06

## 2020-05-06 NOTE — TELEPHONE ENCOUNTER
----- Message from Donta Jenkins sent at 5/6/2020 12:14 PM CDT -----  Contact: Ciaran Canchola         Pt would like to see about getting a check up. He says that he has been feeling more tired than normal and wants to make sure everything is ok with himself. Please give  The patient a call back # [phone]

## 2020-05-11 ENCOUNTER — OFFICE VISIT (OUTPATIENT)
Dept: FAMILY MEDICINE | Facility: CLINIC | Age: 70
End: 2020-05-11
Payer: MEDICARE

## 2020-05-11 VITALS
HEART RATE: 64 BPM | WEIGHT: 281 LBS | BODY MASS INDEX: 41.62 KG/M2 | HEIGHT: 69 IN | TEMPERATURE: 98 F | DIASTOLIC BLOOD PRESSURE: 80 MMHG | SYSTOLIC BLOOD PRESSURE: 136 MMHG

## 2020-05-11 DIAGNOSIS — E78.2 MIXED HYPERLIPIDEMIA: ICD-10-CM

## 2020-05-11 DIAGNOSIS — F33.42 RECURRENT MAJOR DEPRESSIVE DISORDER, IN FULL REMISSION: ICD-10-CM

## 2020-05-11 DIAGNOSIS — I10 ESSENTIAL HYPERTENSION: ICD-10-CM

## 2020-05-11 DIAGNOSIS — R53.83 FATIGUE, UNSPECIFIED TYPE: ICD-10-CM

## 2020-05-11 DIAGNOSIS — R74.8 ELEVATED LIVER ENZYMES: ICD-10-CM

## 2020-05-11 DIAGNOSIS — E66.01 CLASS 3 SEVERE OBESITY DUE TO EXCESS CALORIES WITHOUT SERIOUS COMORBIDITY WITH BODY MASS INDEX (BMI) OF 40.0 TO 44.9 IN ADULT: ICD-10-CM

## 2020-05-11 DIAGNOSIS — R79.89 LOW TESTOSTERONE LEVEL IN MALE: Primary | ICD-10-CM

## 2020-05-11 PROCEDURE — 99214 PR OFFICE/OUTPT VISIT, EST, LEVL IV, 30-39 MIN: ICD-10-PCS | Mod: S$GLB,,, | Performed by: NURSE PRACTITIONER

## 2020-05-11 PROCEDURE — 99214 OFFICE O/P EST MOD 30 MIN: CPT | Mod: S$GLB,,, | Performed by: NURSE PRACTITIONER

## 2020-05-11 RX ORDER — SERTRALINE HYDROCHLORIDE 50 MG/1
100 TABLET, FILM COATED ORAL NIGHTLY
Qty: 180 TABLET | Refills: 1 | Status: SHIPPED | OUTPATIENT
Start: 2020-05-11 | End: 2020-08-13 | Stop reason: SDUPTHER

## 2020-05-11 NOTE — PROGRESS NOTES
SUBJECTIVE:    Patient ID: Ciaran Canchola is a 69 y.o. male.    Chief Complaint: Fatigue (no energy for 3 weeks//has pic of meds tb //requesting  cscope tb )    Pt presents today with c/o generalized fatigue that has been ongoing for the last 3-4 weeks. Some allergy issues but is taking loratadine daily and sometimes Benadryl at night. We had previously stopped his Rosuvastatin d/t elevated liver enzymes. Has not had repeat blood work. Was also found to have low testosterone and was started on testosterone patches. However, he states that he stopped using them d/t breaking out in rashes wherever they were placed.  Denies any SOB, chest pain, palpitations, or feeling lightheaded. Family history of heart disease. Feels that emotions have been up and down. Retired from his job earlier this year that has him at home all the time where as he was previously traveling frequently. Also still struggles with the loss of hid daughter. Currently on Zoloft 50mg. Reports he has not been watching his diet. Walks his dog and works in his garden some but otherwise relatively inactive. Has had weight gain since last visit. BP good in office today but pt states previous PCP had him on BP medications bid. States he checks his BP at home sometimes and it will be in the 140s.       Office Visit on 02/13/2020   Component Date Value Ref Range Status    WBC 02/13/2020 8.1  3.8 - 10.8 Thousand/uL Final    RBC 02/13/2020 4.70  4.20 - 5.80 Million/uL Final    Hemoglobin 02/13/2020 13.9  13.2 - 17.1 g/dL Final    Hematocrit 02/13/2020 42.2  38.5 - 50.0 % Final    Mean Corpuscular Volume 02/13/2020 89.8  80.0 - 100.0 fL Final    Mean Corpuscular Hemoglobin 02/13/2020 29.6  27.0 - 33.0 pg Final    Mean Corpuscular Hemoglobin Conc 02/13/2020 32.9  32.0 - 36.0 g/dL Final    RDW 02/13/2020 13.0  11.0 - 15.0 % Final    Platelets 02/13/2020 225  140 - 400 Thousand/uL Final    MPV 02/13/2020 11.0  7.5 - 12.5 fL Final    Neutrophils  Absolute 02/13/2020 5,573  1,500 - 7,800 cells/uL Final    Lymph # 02/13/2020 1,385  850 - 3,900 cells/uL Final    Mono # 02/13/2020 664  200 - 950 cells/uL Final    Eos # 02/13/2020 421  15 - 500 cells/uL Final    Baso # 02/13/2020 57  0 - 200 cells/uL Final    Neutrophils Relative 02/13/2020 68.8  % Final    Lymph% 02/13/2020 17.1  % Final    Mono% 02/13/2020 8.2  % Final    Eosinophil% 02/13/2020 5.2  % Final    Basophil% 02/13/2020 0.7  % Final    Glucose 02/13/2020 102* 65 - 99 mg/dL Final    BUN, Bld 02/13/2020 16  7 - 25 mg/dL Final    Creatinine 02/13/2020 0.77  0.70 - 1.25 mg/dL Final    eGFR if non African American 02/13/2020 93  > OR = 60 mL/min/1.73m2 Final    eGFR if African American 02/13/2020 107  > OR = 60 mL/min/1.73m2 Final    BUN/Creatinine Ratio 02/13/2020 NOT APPLICABLE  6 - 22 (calc) Final    Sodium 02/13/2020 139  135 - 146 mmol/L Final    Potassium 02/13/2020 4.5  3.5 - 5.3 mmol/L Final    Chloride 02/13/2020 104  98 - 110 mmol/L Final    CO2 02/13/2020 27  20 - 32 mmol/L Final    Calcium 02/13/2020 9.3  8.6 - 10.3 mg/dL Final    Total Protein 02/13/2020 7.3  6.1 - 8.1 g/dL Final    Albumin 02/13/2020 4.2  3.6 - 5.1 g/dL Final    Globulin, Total 02/13/2020 3.1  1.9 - 3.7 g/dL (calc) Final    Albumin/Globulin Ratio 02/13/2020 1.4  1.0 - 2.5 (calc) Final    Total Bilirubin 02/13/2020 0.6  0.2 - 1.2 mg/dL Final    Alkaline Phosphatase 02/13/2020 51  35 - 144 U/L Final    AST 02/13/2020 44* 10 - 35 U/L Final    ALT 02/13/2020 51* 9 - 46 U/L Final    Cholesterol 02/13/2020 122  <200 mg/dL Final    HDL 02/13/2020 41  > OR = 40 mg/dL Final    Triglycerides 02/13/2020 175* <150 mg/dL Final    LDL Cholesterol 02/13/2020 56  mg/dL (calc) Final    Hdl/Cholesterol Ratio 02/13/2020 3.0  <5.0 (calc) Final    Non HDL Chol. (LDL+VLDL) 02/13/2020 81  <130 mg/dL (calc) Final    PROSTATE SPECIFIC ANTIGEN, SCR - Q* 02/13/2020 0.9  < OR = 4.0 ng/mL Final    TSH 02/13/2020 1.82   0.40 - 4.50 mIU/L Final    WBC Casts, UA 02/13/2020 NONE SEEN  < OR = 5 /HPF Final    RBC Casts, UA 02/13/2020 NONE SEEN  < OR = 2 /HPF Final    Squam Epithel, UA 02/13/2020 NONE SEEN  < OR = 5 /HPF Final    Bacteria, UA 02/13/2020 NONE SEEN  NONE SEEN /HPF Final    Hyaline Casts, UA 02/13/2020 NONE SEEN  NONE SEEN /LPF Final    TESTOSTERONE, TOTAL, MALE 02/13/2020 50* 250 - 827 ng/dL Final       Past Medical History:   Diagnosis Date    Depression     Hyperlipidemia      Past Surgical History:   Procedure Laterality Date    CHOLECYSTECTOMY      COLONOSCOPY  2018    RTC in 5 years. done in [place]    TONSILLECTOMY      UMBILICAL HERNIA REPAIR       Family History   Problem Relation Age of Onset    ALS Mother     Heart disease Father     Heart disease Sister        Marital Status:   Alcohol History:  reports that he drinks alcohol.  Tobacco History:  reports that he quit smoking about 35 years ago. His smoking use included cigarettes. He has never used smokeless tobacco.  Drug History:  reports that he does not use drugs.    Review of patient's allergies indicates:  No Known Allergies    Current Outpatient Medications:     clobetasoL (TEMOVATE) 0.05 % cream, Apply topically 2 (two) times daily. Stop when rash is gone and skin is smooth and not itchy., Disp: 60 g, Rfl: 1    esomeprazole (NEXIUM) 20 MG capsule, Take 20 mg by mouth before breakfast., Disp: , Rfl:     lisinopril (PRINIVIL,ZESTRIL) 40 MG tablet, Take 1 tablet (40 mg total) by mouth once daily., Disp: 90 tablet, Rfl: 1    meloxicam (MOBIC) 15 MG tablet, Take 1 tablet (15 mg total) by mouth once daily., Disp: 90 tablet, Rfl: 1    naproxen (NAPROSYN) 500 MG tablet, Take 500 mg by mouth 2 (two) times daily with meals., Disp: , Rfl:     sertraline (ZOLOFT) 50 MG tablet, Take 2 tablets (100 mg total) by mouth every evening., Disp: 180 tablet, Rfl: 1    testosterone (ANDRODERM) 4 mg/24 hr PT24, Place 1 patch (4 mg total) onto the  "skin every evening., Disp: 30 patch, Rfl: 5    Review of Systems   Constitutional: Positive for fatigue. Negative for activity change, chills and fever.   HENT: Positive for sinus pressure. Negative for congestion, postnasal drip and trouble swallowing.    Eyes: Negative for visual disturbance.   Respiratory: Negative for cough, chest tightness and shortness of breath.    Cardiovascular: Negative for chest pain and palpitations.   Gastrointestinal: Negative for abdominal pain and constipation.   Genitourinary: Negative.    Musculoskeletal: Positive for arthralgias.   Neurological: Negative for dizziness, weakness, light-headedness and headaches.   Psychiatric/Behavioral: Negative.           Objective:      Vitals:    05/11/20 0903   BP: 136/80   Pulse: 64   Temp: 97.7 °F (36.5 °C)   Weight: 127.5 kg (281 lb)   Height: 5' 9" (1.753 m)     Body mass index is 41.5 kg/m².  Physical Exam   Constitutional: He is oriented to person, place, and time. He appears well-developed and well-nourished. No distress.   Overweight male   HENT:   Head: Normocephalic and atraumatic.   Neck: Normal range of motion.   Cardiovascular: Normal rate, regular rhythm and normal heart sounds.   No murmur heard.  Pulmonary/Chest: Effort normal and breath sounds normal. No respiratory distress.   Abdominal: He exhibits no distension. There is no tenderness.   Musculoskeletal: Normal range of motion.   Neurological: He is alert and oriented to person, place, and time.   Skin: Skin is warm and dry. Capillary refill takes less than 2 seconds.   Psychiatric: He has a normal mood and affect.   Becomes tearful whenever he mentions his daughter         Assessment:       1. Low testosterone level in male    2. Elevated liver enzymes    3. Mixed hyperlipidemia    4. Recurrent major depressive disorder, in full remission    5. Essential hypertension    6. Class 3 severe obesity due to excess calories without serious comorbidity with body mass index (BMI) " of 40.0 to 44.9 in adult    7. Fatigue, unspecified type         Plan:       Low testosterone level in male  -     Testosterone, Total, Males; Future; Expected date: 05/11/2020  - Expect to still be low. Pt would like to consider testosterone injections.    Elevated liver enzymes  -     Comprehensive metabolic panel; Future; Expected date: 05/11/2020  - Will reassess since stopping rosuvastatin. Consider changing cholesterol med vs GI referral    Mixed hyperlipidemia  -     Lipid Panel; Future; Expected date: 05/11/2020  - Need new lipid panel since stopping rosuvastatin    Recurrent major depressive disorder, in full remission  -     sertraline (ZOLOFT) 50 MG tablet; Take 2 tablets (100 mg total) by mouth every evening.  Dispense: 180 tablet; Refill: 1  - Will increase Zoloft dose to see if it makes better impact on overall mood.    Essential hypertension  - BP okay in office today. Advised pt to please keep BP log over the next few weeks so that we can determine if we need to add or increase to his med regimen.    Class 3 severe obesity due to excess calories without serious comorbidity with body mass index (BMI) of 40.0 to 44.9 in adult    Fatigue, unspecified type  - Several possibilities contributing to fatigue including low testosterone, inactivity, stress r/t current pandemic. Will get some lab work to r/o physiological causes. Will follow-up with patient once lab work is completed.    Follow up for pending lab results.

## 2020-05-12 ENCOUNTER — TELEPHONE (OUTPATIENT)
Dept: FAMILY MEDICINE | Facility: CLINIC | Age: 70
End: 2020-05-12

## 2020-05-12 DIAGNOSIS — E78.2 MIXED HYPERLIPIDEMIA: Primary | ICD-10-CM

## 2020-05-12 DIAGNOSIS — E29.1 MALE HYPOGONADISM: ICD-10-CM

## 2020-05-12 DIAGNOSIS — Z12.5 SCREENING FOR MALIGNANT NEOPLASM OF PROSTATE: ICD-10-CM

## 2020-05-12 DIAGNOSIS — Z79.890 LONG-TERM CURRENT USE OF TESTOSTERONE REPLACEMENT THERAPY: ICD-10-CM

## 2020-05-12 LAB
ALBUMIN SERPL-MCNC: 3.8 G/DL (ref 3.6–5.1)
ALBUMIN/GLOB SERPL: 1.3 (CALC) (ref 1–2.5)
ALP SERPL-CCNC: 47 U/L (ref 35–144)
ALT SERPL-CCNC: 49 U/L (ref 9–46)
AST SERPL-CCNC: 38 U/L (ref 10–35)
BILIRUB SERPL-MCNC: 0.5 MG/DL (ref 0.2–1.2)
BUN SERPL-MCNC: 18 MG/DL (ref 7–25)
BUN/CREAT SERPL: ABNORMAL (CALC) (ref 6–22)
CALCIUM SERPL-MCNC: 9.2 MG/DL (ref 8.6–10.3)
CHLORIDE SERPL-SCNC: 103 MMOL/L (ref 98–110)
CHOLEST SERPL-MCNC: 165 MG/DL
CHOLEST/HDLC SERPL: 4.5 (CALC)
CO2 SERPL-SCNC: 28 MMOL/L (ref 20–32)
CREAT SERPL-MCNC: 0.84 MG/DL (ref 0.7–1.25)
GFRSERPLBLD MDRD-ARVRAT: 89 ML/MIN/1.73M2
GLOBULIN SER CALC-MCNC: 3 G/DL (CALC) (ref 1.9–3.7)
GLUCOSE SERPL-MCNC: 105 MG/DL (ref 65–99)
HDLC SERPL-MCNC: 37 MG/DL
LDLC SERPL CALC-MCNC: 97 MG/DL (CALC)
NONHDLC SERPL-MCNC: 128 MG/DL (CALC)
POTASSIUM SERPL-SCNC: 4.5 MMOL/L (ref 3.5–5.3)
PROT SERPL-MCNC: 6.8 G/DL (ref 6.1–8.1)
SODIUM SERPL-SCNC: 140 MMOL/L (ref 135–146)
TESTOST SERPL-MCNC: 46 NG/DL (ref 250–827)
TRIGL SERPL-MCNC: 226 MG/DL

## 2020-05-12 RX ORDER — ROSUVASTATIN CALCIUM 5 MG/1
5 TABLET, COATED ORAL DAILY
Qty: 90 TABLET | Refills: 1 | Status: SHIPPED | OUTPATIENT
Start: 2020-05-12 | End: 2020-08-13 | Stop reason: SDUPTHER

## 2020-05-12 RX ORDER — TESTOSTERONE CYPIONATE 200 MG/ML
200 INJECTION, SOLUTION INTRAMUSCULAR
Qty: 2 ML | Refills: 2 | Status: SHIPPED | OUTPATIENT
Start: 2020-05-12 | End: 2020-07-09 | Stop reason: SDUPTHER

## 2020-05-12 NOTE — PROGRESS NOTES
Spoke to patient who understood the message.    He chooses to do the injections, so we will send the ERX to [facility].   Also, he needs a new ERX for rosuvastatin. Message sent to Bushra to add medicine/ba

## 2020-05-12 NOTE — PROGRESS NOTES
Please let patient know that his testosterone is once again low. We have 2 options- 1) we can start testosterone injections every 2 weeks, I can send the rx to the [facility] inside of [facility] and they can administer the injections there. 2) the other option is we can try the testosterone gel. The gel is much more costly than the injections. It is his preference. Let's also restart taking cholesterol medication as those numbers are increasing. We will switch to him only taking it 3 days per week, so he can take it Mon, Weds, Fri. He can start back at the dose he was taking previously of the rosuvastatin. I can send in a new rx if he needs it.

## 2020-05-12 NOTE — TELEPHONE ENCOUNTER
Spoke to patient who understood the message.    He chooses to do the injections, so we will send the ERX to [facility].   Also, he needs a new ERX for rosuvastatin. Message sent to Bushra to add medicine/ba

## 2020-05-12 NOTE — PROGRESS NOTES
Testosterone was sent to [facility] at [facility], rosuvastatin was sent to [facility] in [place], and lab work was placed at [facility] for him to have done about 6-8 weeks after starting injections.  Remind Me created /ba

## 2020-05-12 NOTE — TELEPHONE ENCOUNTER
----- Message from Bushra Harmon NP sent at 5/12/2020 11:27 AM CDT -----  Please let patient know that his testosterone is once again low. We have 2 options- 1) we can start testosterone injections every 2 weeks, I can send the rx to the [facility] pharmacy inside of [facility] and they can administer the injections there. 2) the other option is we can try the testosterone gel. The gel is much more costly than the injections. It is his preference. Let's also restart taking cholesterol medication as those numbers are increasing. We will switch to him only taking it 3 days per week, so he can take it Mon, Weds, Fri. He can start back at the dose he was taking previously of the rosuvastatin. I can send in a new rx if he needs it.

## 2020-05-12 NOTE — TELEPHONE ENCOUNTER
Testosterone was sent to [facility] at [facility], rosuvastatin was sent to [facility] in [place], and lab work was placed at [facility] for him to have done about 6-8 weeks after starting injections.

## 2020-05-14 ENCOUNTER — DOCUMENTATION ONLY (OUTPATIENT)
Dept: PHARMACY | Facility: CLINIC | Age: 70
End: 2020-05-14

## 2020-05-28 ENCOUNTER — DOCUMENTATION ONLY (OUTPATIENT)
Dept: PHARMACY | Facility: CLINIC | Age: 70
End: 2020-05-28

## 2020-05-28 NOTE — PROGRESS NOTES
Administered testosterone 200mg to the right upper gluteal muscle on 5/28/2020.   LOT: [id number]   EXP: 7/2021    Patient due for next injection on 6/11/2020.    Neyda VerdeD

## 2020-06-11 ENCOUNTER — DOCUMENTATION ONLY (OUTPATIENT)
Dept: PHARMACY | Facility: CLINIC | Age: 70
End: 2020-06-11

## 2020-06-25 ENCOUNTER — DOCUMENTATION ONLY (OUTPATIENT)
Dept: PHARMACY | Facility: CLINIC | Age: 70
End: 2020-06-25

## 2020-07-09 ENCOUNTER — DOCUMENTATION ONLY (OUTPATIENT)
Dept: PHARMACY | Facility: CLINIC | Age: 70
End: 2020-07-09

## 2020-07-09 DIAGNOSIS — E29.1 MALE HYPOGONADISM: ICD-10-CM

## 2020-07-09 RX ORDER — TESTOSTERONE CYPIONATE 200 MG/ML
200 INJECTION, SOLUTION INTRAMUSCULAR
Qty: 2 ML | Refills: 2 | Status: SHIPPED | OUTPATIENT
Start: 2020-07-09 | End: 2020-08-13 | Stop reason: SDUPTHER

## 2020-07-23 ENCOUNTER — DOCUMENTATION ONLY (OUTPATIENT)
Dept: PHARMACY | Facility: CLINIC | Age: 70
End: 2020-07-23

## 2020-07-27 ENCOUNTER — TELEPHONE (OUTPATIENT)
Dept: FAMILY MEDICINE | Facility: CLINIC | Age: 70
End: 2020-07-27

## 2020-07-27 NOTE — TELEPHONE ENCOUNTER
----- Message from Sandi Parada sent at 5/12/2020  1:26 PM CDT -----  Testosterone was sent to [facility] at [facility], rosuvastatin was sent to [facility] in [place], and lab work was placed at [facility] for him to have done about 6-8 weeks after starting injections.

## 2020-08-06 ENCOUNTER — DOCUMENTATION ONLY (OUTPATIENT)
Dept: PHARMACY | Facility: CLINIC | Age: 70
End: 2020-08-06

## 2020-08-07 LAB
BASOPHILS # BLD AUTO: 52 CELLS/UL (ref 0–200)
BASOPHILS NFR BLD AUTO: 0.6 %
CHOLEST SERPL-MCNC: 121 MG/DL
CHOLEST/HDLC SERPL: 3.6 (CALC)
EOSINOPHIL # BLD AUTO: 310 CELLS/UL (ref 15–500)
EOSINOPHIL NFR BLD AUTO: 3.6 %
ERYTHROCYTE [DISTWIDTH] IN BLOOD BY AUTOMATED COUNT: 13.1 % (ref 11–15)
HCT VFR BLD AUTO: 46.3 % (ref 38.5–50)
HDLC SERPL-MCNC: 34 MG/DL
HGB BLD-MCNC: 14.9 G/DL (ref 13.2–17.1)
LDLC SERPL CALC-MCNC: 65 MG/DL (CALC)
LYMPHOCYTES # BLD AUTO: 1479 CELLS/UL (ref 850–3900)
LYMPHOCYTES NFR BLD AUTO: 17.2 %
MCH RBC QN AUTO: 29.7 PG (ref 27–33)
MCHC RBC AUTO-ENTMCNC: 32.2 G/DL (ref 32–36)
MCV RBC AUTO: 92.2 FL (ref 80–100)
MONOCYTES # BLD AUTO: 740 CELLS/UL (ref 200–950)
MONOCYTES NFR BLD AUTO: 8.6 %
NEUTROPHILS # BLD AUTO: 6020 CELLS/UL (ref 1500–7800)
NEUTROPHILS NFR BLD AUTO: 70 %
NONHDLC SERPL-MCNC: 87 MG/DL (CALC)
PLATELET # BLD AUTO: 205 THOUSAND/UL (ref 140–400)
PMV BLD REES-ECKER: 11.4 FL (ref 7.5–12.5)
PSA SERPL-MCNC: 1.4 NG/ML
RBC # BLD AUTO: 5.02 MILLION/UL (ref 4.2–5.8)
TESTOST SERPL-MCNC: 608 NG/DL (ref 250–827)
TRIGL SERPL-MCNC: 134 MG/DL
WBC # BLD AUTO: 8.6 THOUSAND/UL (ref 3.8–10.8)

## 2020-08-13 ENCOUNTER — OFFICE VISIT (OUTPATIENT)
Dept: FAMILY MEDICINE | Facility: CLINIC | Age: 70
End: 2020-08-13
Payer: MEDICARE

## 2020-08-13 VITALS
WEIGHT: 287 LBS | HEIGHT: 69 IN | TEMPERATURE: 98 F | SYSTOLIC BLOOD PRESSURE: 138 MMHG | DIASTOLIC BLOOD PRESSURE: 60 MMHG | HEART RATE: 64 BPM | BODY MASS INDEX: 42.51 KG/M2

## 2020-08-13 DIAGNOSIS — E78.2 MIXED HYPERLIPIDEMIA: ICD-10-CM

## 2020-08-13 DIAGNOSIS — Z12.11 COLON CANCER SCREENING: Primary | ICD-10-CM

## 2020-08-13 DIAGNOSIS — Z23 NEED FOR VACCINATION WITH 13-POLYVALENT PNEUMOCOCCAL CONJUGATE VACCINE: ICD-10-CM

## 2020-08-13 DIAGNOSIS — I10 ESSENTIAL HYPERTENSION: ICD-10-CM

## 2020-08-13 DIAGNOSIS — E29.1 MALE HYPOGONADISM: ICD-10-CM

## 2020-08-13 DIAGNOSIS — L20.84 INTRINSIC ECZEMA: ICD-10-CM

## 2020-08-13 DIAGNOSIS — F33.42 RECURRENT MAJOR DEPRESSIVE DISORDER, IN FULL REMISSION: ICD-10-CM

## 2020-08-13 DIAGNOSIS — M19.90 ARTHRITIS: ICD-10-CM

## 2020-08-13 PROCEDURE — G0009 ADMIN PNEUMOCOCCAL VACCINE: HCPCS | Mod: S$GLB,,, | Performed by: NURSE PRACTITIONER

## 2020-08-13 PROCEDURE — 90670 PNEUMOCOCCAL CONJUGATE VACCINE 13-VALENT LESS THAN 5YO & GREATER THAN: ICD-10-PCS | Mod: S$GLB,,, | Performed by: NURSE PRACTITIONER

## 2020-08-13 PROCEDURE — 99214 OFFICE O/P EST MOD 30 MIN: CPT | Mod: 25,S$GLB,, | Performed by: NURSE PRACTITIONER

## 2020-08-13 PROCEDURE — G0009 PNEUMOCOCCAL CONJUGATE VACCINE 13-VALENT LESS THAN 5YO & GREATER THAN: ICD-10-PCS | Mod: S$GLB,,, | Performed by: NURSE PRACTITIONER

## 2020-08-13 PROCEDURE — 99214 PR OFFICE/OUTPT VISIT, EST, LEVL IV, 30-39 MIN: ICD-10-PCS | Mod: 25,S$GLB,, | Performed by: NURSE PRACTITIONER

## 2020-08-13 PROCEDURE — 90670 PCV13 VACCINE IM: CPT | Mod: S$GLB,,, | Performed by: NURSE PRACTITIONER

## 2020-08-13 RX ORDER — ROSUVASTATIN CALCIUM 5 MG/1
5 TABLET, COATED ORAL DAILY
Qty: 90 TABLET | Refills: 1 | Status: SHIPPED | OUTPATIENT
Start: 2020-08-13 | End: 2021-08-09 | Stop reason: SDUPTHER

## 2020-08-13 RX ORDER — MELOXICAM 15 MG/1
15 TABLET ORAL DAILY
Qty: 90 TABLET | Refills: 1 | Status: SHIPPED | OUTPATIENT
Start: 2020-08-13 | End: 2021-04-07 | Stop reason: SDUPTHER

## 2020-08-13 RX ORDER — SERTRALINE HYDROCHLORIDE 50 MG/1
100 TABLET, FILM COATED ORAL NIGHTLY
Qty: 180 TABLET | Refills: 1 | Status: SHIPPED | OUTPATIENT
Start: 2020-08-13 | End: 2021-08-09 | Stop reason: SDUPTHER

## 2020-08-13 RX ORDER — LISINOPRIL 40 MG/1
40 TABLET ORAL DAILY
Qty: 90 TABLET | Refills: 1 | Status: SHIPPED | OUTPATIENT
Start: 2020-08-13 | End: 2021-02-04 | Stop reason: SDUPTHER

## 2020-08-13 RX ORDER — CLOBETASOL PROPIONATE 0.5 MG/G
CREAM TOPICAL 2 TIMES DAILY
Qty: 45 G | Refills: 4 | Status: SHIPPED | OUTPATIENT
Start: 2020-08-13 | End: 2021-08-09 | Stop reason: SDUPTHER

## 2020-08-13 RX ORDER — TESTOSTERONE CYPIONATE 200 MG/ML
200 INJECTION, SOLUTION INTRAMUSCULAR
Qty: 1 ML | Refills: 5 | Status: SHIPPED | OUTPATIENT
Start: 2020-09-10 | End: 2021-02-22 | Stop reason: SDUPTHER

## 2020-08-13 RX ORDER — NAPROXEN 500 MG/1
500 TABLET ORAL 2 TIMES DAILY PRN
Qty: 60 TABLET | Refills: 4 | Status: SHIPPED | OUTPATIENT
Start: 2020-08-13 | End: 2021-08-09 | Stop reason: SDUPTHER

## 2020-08-13 NOTE — PROGRESS NOTES
SUBJECTIVE:    Patient ID: Ciaran Canchola is a 69 y.o. male.    Chief Complaint: Annual Exam (Wellness Visit, brought bottles, C-scope ordered, PNA 13 wants// SW)    Pt presents for routine follow-up. Has been on testosterone injections [id number] for several months now. Testosterone now in normal range. Pt feeling better, more energized. No new complaints or issues. Requesting refill on Clobetasol for eczema originally prescribed by Derm. Blood work reviewed with pt.       Telephone on 05/12/2020   Component Date Value Ref Range Status    WBC 08/06/2020 8.6  3.8 - 10.8 Thousand/uL Final    RBC 08/06/2020 5.02  4.20 - 5.80 Million/uL Final    Hemoglobin 08/06/2020 14.9  13.2 - 17.1 g/dL Final    Hematocrit 08/06/2020 46.3  38.5 - 50.0 % Final    Mean Corpuscular Volume 08/06/2020 92.2  80.0 - 100.0 fL Final    Mean Corpuscular Hemoglobin 08/06/2020 29.7  27.0 - 33.0 pg Final    Mean Corpuscular Hemoglobin Conc 08/06/2020 32.2  32.0 - 36.0 g/dL Final    RDW 08/06/2020 13.1  11.0 - 15.0 % Final    Platelets 08/06/2020 205  140 - 400 Thousand/uL Final    MPV 08/06/2020 11.4  7.5 - 12.5 fL Final    Neutrophils Absolute 08/06/2020 6,020  1,500 - 7,800 cells/uL Final    Lymph # 08/06/2020 1,479  850 - 3,900 cells/uL Final    Mono # 08/06/2020 740  200 - 950 cells/uL Final    Eos # 08/06/2020 310  15 - 500 cells/uL Final    Baso # 08/06/2020 52  0 - 200 cells/uL Final    Neutrophils Relative 08/06/2020 70  % Final    Lymph% 08/06/2020 17.2  % Final    Mono% 08/06/2020 8.6  % Final    Eosinophil% 08/06/2020 3.6  % Final    Basophil% 08/06/2020 0.6  % Final    PROSTATE SPECIFIC ANTIGEN, SCR - Q* 08/06/2020 1.4  < OR = 4.0 ng/mL Final    Cholesterol 08/06/2020 121  <200 mg/dL Final    HDL 08/06/2020 34* > OR = 40 mg/dL Final    Triglycerides 08/06/2020 134  <150 mg/dL Final    LDL Cholesterol 08/06/2020 65  mg/dL (calc) Final    Hdl/Cholesterol Ratio 08/06/2020 3.6  <5.0 (calc) Final    Non HDL Chol.  (LDL+VLDL) 08/06/2020 87  <130 mg/dL (calc) Final    TESTOSTERONE, TOTAL, MALE 08/06/2020 608  250 - 827 ng/dL Final   Office Visit on 05/11/2020   Component Date Value Ref Range Status    Glucose 05/11/2020 105* 65 - 99 mg/dL Final    BUN, Bld 05/11/2020 18  7 - 25 mg/dL Final    Creatinine 05/11/2020 0.84  0.70 - 1.25 mg/dL Final    eGFR if non African American 05/11/2020 89  > OR = 60 mL/min/1.73m2 Final    eGFR if African American 05/11/2020 104  > OR = 60 mL/min/1.73m2 Final    BUN/Creatinine Ratio 05/11/2020 NOT APPLICABLE  6 - 22 (calc) Final    Sodium 05/11/2020 140  135 - 146 mmol/L Final    Potassium 05/11/2020 4.5  3.5 - 5.3 mmol/L Final    Chloride 05/11/2020 103  98 - 110 mmol/L Final    CO2 05/11/2020 28  20 - 32 mmol/L Final    Calcium 05/11/2020 9.2  8.6 - 10.3 mg/dL Final    Total Protein 05/11/2020 6.8  6.1 - 8.1 g/dL Final    Albumin 05/11/2020 3.8  3.6 - 5.1 g/dL Final    Globulin, Total 05/11/2020 3.0  1.9 - 3.7 g/dL (calc) Final    Albumin/Globulin Ratio 05/11/2020 1.3  1.0 - 2.5 (calc) Final    Total Bilirubin 05/11/2020 0.5  0.2 - 1.2 mg/dL Final    Alkaline Phosphatase 05/11/2020 47  35 - 144 U/L Final    AST 05/11/2020 38* 10 - 35 U/L Final    ALT 05/11/2020 49* 9 - 46 U/L Final    Cholesterol 05/11/2020 165  <200 mg/dL Final    HDL 05/11/2020 37* > OR = 40 mg/dL Final    Triglycerides 05/11/2020 226* <150 mg/dL Final    LDL Cholesterol 05/11/2020 97  mg/dL (calc) Final    Hdl/Cholesterol Ratio 05/11/2020 4.5  <5.0 (calc) Final    Non HDL Chol. (LDL+VLDL) 05/11/2020 128  <130 mg/dL (calc) Final    TESTOSTERONE, TOTAL, MALE 05/11/2020 46* 250 - 827 ng/dL Final       Past Medical History:   Diagnosis Date    Depression     Hyperlipidemia      Past Surgical History:   Procedure Laterality Date    CHOLECYSTECTOMY      COLONOSCOPY  2018    RTC in 5 years. done in [place]    TONSILLECTOMY      UMBILICAL HERNIA REPAIR       Family History   Problem Relation Age of  Onset    ALS Mother     Heart disease Father     Heart disease Sister        Marital Status:   Alcohol History:  reports current alcohol use.  Tobacco History:  reports that he quit smoking about 35 years ago. His smoking use included cigarettes. He has never used smokeless tobacco.  Drug History:  reports no history of drug use.    Review of patient's allergies indicates:  No Known Allergies    Current Outpatient Medications:     clobetasoL (TEMOVATE) 0.05 % cream, Apply topically 2 (two) times daily. Stop when rash is gone and skin is smooth and not itchy., Disp: 60 g, Rfl: 1    esomeprazole (NEXIUM) 20 MG capsule, Take 20 mg by mouth before breakfast., Disp: , Rfl:     lisinopriL (PRINIVIL,ZESTRIL) 40 MG tablet, Take 1 tablet (40 mg total) by mouth once daily., Disp: 90 tablet, Rfl: 1    meloxicam (MOBIC) 15 MG tablet, Take 1 tablet (15 mg total) by mouth once daily., Disp: 90 tablet, Rfl: 1    naproxen (NAPROSYN) 500 MG tablet, Take 1 tablet (500 mg total) by mouth 2 (two) times daily as needed., Disp: 60 tablet, Rfl: 4    rosuvastatin (CRESTOR) 5 MG tablet, Take 1 tablet (5 mg total) by mouth once daily., Disp: 90 tablet, Rfl: 1    sertraline (ZOLOFT) 50 MG tablet, Take 2 tablets (100 mg total) by mouth every evening., Disp: 180 tablet, Rfl: 1    [START ON 9/10/2020] testosterone cypionate (DEPOTESTOTERONE CYPIONATE) 200 mg/mL injection, Inject 1 mL (200 mg total) into the muscle every 28 days., Disp: 1 mL, Rfl: 5    clobetasoL (TEMOVATE) 0.05 % cream, Apply topically 2 (two) times daily., Disp: 45 g, Rfl: 4    Review of Systems   Constitutional: Negative.  Negative for fatigue.   HENT: Negative for congestion, ear pain, sinus pressure, sinus pain, tinnitus and trouble swallowing.    Eyes: Negative for pain and redness.   Respiratory: Negative for cough, chest tightness, shortness of breath and wheezing.    Cardiovascular: Negative for chest pain and palpitations.   Gastrointestinal: Negative  "for abdominal pain, nausea and vomiting.   Genitourinary: Negative for dysuria, frequency and urgency.   Musculoskeletal: Positive for arthralgias. Negative for back pain and myalgias.   Skin: Negative for rash and wound.   Neurological: Negative for dizziness, weakness, light-headedness and headaches.   Psychiatric/Behavioral: Negative.           Objective:      Vitals:    08/13/20 1041   BP: 138/60   Pulse: 64   Temp: 97.9 °F (36.6 °C)   Weight: 130.2 kg (287 lb)   Height: 5' 9" (1.753 m)     Body mass index is 42.38 kg/m².  Physical Exam  Vitals signs reviewed.   Constitutional:       General: He is not in acute distress.     Appearance: Normal appearance. He is well-developed.   HENT:      Head: Normocephalic and atraumatic.      Right Ear: Tympanic membrane normal.      Left Ear: Tympanic membrane normal.      Nose: Nose normal. No nasal deformity or mucosal edema.      Mouth/Throat:      Dentition: No dental caries or dental abscesses.      Pharynx: No oropharyngeal exudate.   Eyes:      General: Lids are normal.      Conjunctiva/sclera: Conjunctivae normal.      Pupils: Pupils are equal, round, and reactive to light.   Neck:      Musculoskeletal: Normal range of motion.      Thyroid: No thyromegaly.      Vascular: No JVD.      Trachea: No tracheal tenderness or tracheal deviation.   Cardiovascular:      Rate and Rhythm: Normal rate and regular rhythm.      Heart sounds: Normal heart sounds. No murmur.   Pulmonary:      Effort: Pulmonary effort is normal. No accessory muscle usage or respiratory distress.      Breath sounds: Normal breath sounds.   Abdominal:      General: Bowel sounds are normal.      Palpations: Abdomen is soft.      Tenderness: There is no abdominal tenderness.   Musculoskeletal: Normal range of motion.         General: No tenderness.   Lymphadenopathy:      Cervical: No cervical adenopathy.   Skin:     General: Skin is warm and dry.      Capillary Refill: Capillary refill takes less than 2 " seconds.      Findings: No bruising or ecchymosis.   Neurological:      Mental Status: He is alert and oriented to person, place, and time.   Psychiatric:         Mood and Affect: Mood normal.         Behavior: Behavior normal.           Assessment:       1. Colon cancer screening    2. Essential hypertension    3. Arthritis    4. Mixed hyperlipidemia    5. Recurrent major depressive disorder, in full remission    6. Male hypogonadism    7. Need for vaccination with 13-polyvalent pneumococcal conjugate vaccine    8. Intrinsic eczema         Plan:       Colon cancer screening  -     Ambulatory referral/consult to Gastroenterology; Future; Expected date: 08/20/2020    Essential hypertension  -     lisinopriL (PRINIVIL,ZESTRIL) 40 MG tablet; Take 1 tablet (40 mg total) by mouth once daily.  Dispense: 90 tablet; Refill: 1    Arthritis  -     meloxicam (MOBIC) 15 MG tablet; Take 1 tablet (15 mg total) by mouth once daily.  Dispense: 90 tablet; Refill: 1  -     naproxen (NAPROSYN) 500 MG tablet; Take 1 tablet (500 mg total) by mouth 2 (two) times daily as needed.  Dispense: 60 tablet; Refill: 4    Mixed hyperlipidemia  -     rosuvastatin (CRESTOR) 5 MG tablet; Take 1 tablet (5 mg total) by mouth once daily.  Dispense: 90 tablet; Refill: 1    Recurrent major depressive disorder, in full remission  -     sertraline (ZOLOFT) 50 MG tablet; Take 2 tablets (100 mg total) by mouth every evening.  Dispense: 180 tablet; Refill: 1    Male hypogonadism  -     testosterone cypionate (DEPOTESTOTERONE CYPIONATE) 200 mg/mL injection; Inject 1 mL (200 mg total) into the muscle every 28 days.  Dispense: 1 mL; Refill: 5  -     Testosterone, Total, Males; Future; Expected date: 11/13/2020  - Will decrease dose down to once monthly after next week's injection. Repeat testosterone in 12 weeks.     Need for vaccination with 13-polyvalent pneumococcal conjugate vaccine  -     Pneumococcal Conjugate Vaccine (13 Valent) (IM)    Intrinsic  eczema  -     clobetasoL (TEMOVATE) 0.05 % cream; Apply topically 2 (two) times daily.  Dispense: 45 g; Refill: 4      Follow up in about 6 months (around 2/13/2021) for med check.

## 2020-09-03 ENCOUNTER — DOCUMENTATION ONLY (OUTPATIENT)
Dept: PHARMACY | Facility: CLINIC | Age: 70
End: 2020-09-03

## 2020-10-01 ENCOUNTER — DOCUMENTATION ONLY (OUTPATIENT)
Dept: PHARMACY | Facility: CLINIC | Age: 70
End: 2020-10-01

## 2020-11-05 ENCOUNTER — DOCUMENTATION ONLY (OUTPATIENT)
Dept: PHARMACY | Facility: CLINIC | Age: 70
End: 2020-11-05

## 2020-11-05 NOTE — PROGRESS NOTES
Administered Depo-Testosterone 200mg into the RIGHT upper gluteal muscle on 11/5/2020 at 12pm. Patient tolerated injection well. Patient is due for next injection in 28 days.     NDC 5962-4645-12  LOT [id number]  EXP 12/2022    Neyda VerdeD

## 2020-12-03 ENCOUNTER — DOCUMENTATION ONLY (OUTPATIENT)
Dept: PHARMACY | Facility: CLINIC | Age: 70
End: 2020-12-03

## 2020-12-30 ENCOUNTER — TELEPHONE (OUTPATIENT)
Dept: FAMILY MEDICINE | Facility: CLINIC | Age: 70
End: 2020-12-30

## 2020-12-30 NOTE — TELEPHONE ENCOUNTER
Tried calling pt to rescheduled his appt on 2/15/2021. Not able to LMOR due to VM not being setup

## 2020-12-31 ENCOUNTER — DOCUMENTATION ONLY (OUTPATIENT)
Dept: PHARMACY | Facility: CLINIC | Age: 70
End: 2020-12-31

## 2021-01-25 ENCOUNTER — TELEPHONE (OUTPATIENT)
Dept: FAMILY MEDICINE | Facility: CLINIC | Age: 71
End: 2021-01-25

## 2021-01-25 DIAGNOSIS — Z79.899 ENCOUNTER FOR LONG-TERM (CURRENT) USE OF OTHER MEDICATIONS: Primary | ICD-10-CM

## 2021-01-25 DIAGNOSIS — E78.2 MIXED HYPERLIPIDEMIA: ICD-10-CM

## 2021-01-25 DIAGNOSIS — Z12.5 SPECIAL SCREENING FOR MALIGNANT NEOPLASM OF PROSTATE: ICD-10-CM

## 2021-01-25 DIAGNOSIS — I10 ESSENTIAL HYPERTENSION: ICD-10-CM

## 2021-01-28 LAB
ALBUMIN SERPL-MCNC: 4.2 G/DL (ref 3.6–5.1)
ALBUMIN/CREAT UR: 8 MCG/MG CREAT
ALBUMIN/GLOB SERPL: 1.5 (CALC) (ref 1–2.5)
ALP SERPL-CCNC: 41 U/L (ref 35–144)
ALT SERPL-CCNC: 48 U/L (ref 9–46)
APPEARANCE UR: CLEAR
AST SERPL-CCNC: 40 U/L (ref 10–35)
BACTERIA #/AREA URNS HPF: NORMAL /HPF
BACTERIA UR CULT: NORMAL
BASOPHILS # BLD AUTO: 54 CELLS/UL (ref 0–200)
BASOPHILS NFR BLD AUTO: 0.8 %
BILIRUB SERPL-MCNC: 0.6 MG/DL (ref 0.2–1.2)
BILIRUB UR QL STRIP: NEGATIVE
BUN SERPL-MCNC: 22 MG/DL (ref 7–25)
BUN/CREAT SERPL: ABNORMAL (CALC) (ref 6–22)
CALCIUM SERPL-MCNC: 9.1 MG/DL (ref 8.6–10.3)
CHLORIDE SERPL-SCNC: 104 MMOL/L (ref 98–110)
CHOLEST SERPL-MCNC: 115 MG/DL
CHOLEST/HDLC SERPL: 3.1 (CALC)
CO2 SERPL-SCNC: 27 MMOL/L (ref 20–32)
COLOR UR: NORMAL
CREAT SERPL-MCNC: 0.88 MG/DL (ref 0.7–1.18)
CREAT UR-MCNC: 165 MG/DL (ref 20–320)
EOSINOPHIL # BLD AUTO: 335 CELLS/UL (ref 15–500)
EOSINOPHIL NFR BLD AUTO: 5 %
ERYTHROCYTE [DISTWIDTH] IN BLOOD BY AUTOMATED COUNT: 13.2 % (ref 11–15)
GFRSERPLBLD MDRD-ARVRAT: 87 ML/MIN/1.73M2
GLOBULIN SER CALC-MCNC: 2.8 G/DL (CALC) (ref 1.9–3.7)
GLUCOSE SERPL-MCNC: 136 MG/DL (ref 65–99)
GLUCOSE UR QL STRIP: NEGATIVE
HCT VFR BLD AUTO: 44.6 % (ref 38.5–50)
HDLC SERPL-MCNC: 37 MG/DL
HGB BLD-MCNC: 14.8 G/DL (ref 13.2–17.1)
HGB UR QL STRIP: NEGATIVE
HYALINE CASTS #/AREA URNS LPF: NORMAL /LPF
KETONES UR QL STRIP: NEGATIVE
LDLC SERPL CALC-MCNC: 58 MG/DL (CALC)
LEUKOCYTE ESTERASE UR QL STRIP: NEGATIVE
LYMPHOCYTES # BLD AUTO: 1528 CELLS/UL (ref 850–3900)
LYMPHOCYTES NFR BLD AUTO: 22.8 %
MCH RBC QN AUTO: 30.3 PG (ref 27–33)
MCHC RBC AUTO-ENTMCNC: 33.2 G/DL (ref 32–36)
MCV RBC AUTO: 91.2 FL (ref 80–100)
MICROALBUMIN UR-MCNC: 1.3 MG/DL
MONOCYTES # BLD AUTO: 657 CELLS/UL (ref 200–950)
MONOCYTES NFR BLD AUTO: 9.8 %
NEUTROPHILS # BLD AUTO: 4127 CELLS/UL (ref 1500–7800)
NEUTROPHILS NFR BLD AUTO: 61.6 %
NITRITE UR QL STRIP: NEGATIVE
NONHDLC SERPL-MCNC: 78 MG/DL (CALC)
PH UR STRIP: NORMAL [PH] (ref 5–8)
PLATELET # BLD AUTO: 188 THOUSAND/UL (ref 140–400)
PMV BLD REES-ECKER: 11.2 FL (ref 7.5–12.5)
POTASSIUM SERPL-SCNC: 4.5 MMOL/L (ref 3.5–5.3)
PROT SERPL-MCNC: 7 G/DL (ref 6.1–8.1)
PROT UR QL STRIP: NEGATIVE
RBC # BLD AUTO: 4.89 MILLION/UL (ref 4.2–5.8)
RBC #/AREA URNS HPF: NORMAL /HPF
SODIUM SERPL-SCNC: 139 MMOL/L (ref 135–146)
SP GR UR STRIP: 1.03 (ref 1–1.03)
SQUAMOUS #/AREA URNS HPF: NORMAL /HPF
TRIGL SERPL-MCNC: 112 MG/DL
TSH SERPL-ACNC: 1.66 MIU/L (ref 0.4–4.5)
WBC # BLD AUTO: 6.7 THOUSAND/UL (ref 3.8–10.8)
WBC #/AREA URNS HPF: NORMAL /HPF

## 2021-02-04 ENCOUNTER — OFFICE VISIT (OUTPATIENT)
Dept: FAMILY MEDICINE | Facility: CLINIC | Age: 71
End: 2021-02-04
Payer: MEDICARE

## 2021-02-04 VITALS
WEIGHT: 277 LBS | BODY MASS INDEX: 41.03 KG/M2 | HEIGHT: 69 IN | DIASTOLIC BLOOD PRESSURE: 80 MMHG | SYSTOLIC BLOOD PRESSURE: 132 MMHG | HEART RATE: 64 BPM

## 2021-02-04 DIAGNOSIS — F33.42 RECURRENT MAJOR DEPRESSIVE DISORDER, IN FULL REMISSION: ICD-10-CM

## 2021-02-04 DIAGNOSIS — Z12.5 PROSTATE CANCER SCREENING: ICD-10-CM

## 2021-02-04 DIAGNOSIS — E78.2 MIXED HYPERLIPIDEMIA: ICD-10-CM

## 2021-02-04 DIAGNOSIS — E29.1 MALE HYPOGONADISM: Primary | ICD-10-CM

## 2021-02-04 DIAGNOSIS — I10 ESSENTIAL HYPERTENSION: ICD-10-CM

## 2021-02-04 DIAGNOSIS — E66.01 CLASS 3 SEVERE OBESITY WITH BODY MASS INDEX (BMI) OF 40.0 TO 44.9 IN ADULT, UNSPECIFIED OBESITY TYPE, UNSPECIFIED WHETHER SERIOUS COMORBIDITY PRESENT: ICD-10-CM

## 2021-02-04 PROCEDURE — 99213 OFFICE O/P EST LOW 20 MIN: CPT | Mod: S$GLB,,, | Performed by: NURSE PRACTITIONER

## 2021-02-04 PROCEDURE — 99213 PR OFFICE/OUTPT VISIT, EST, LEVL III, 20-29 MIN: ICD-10-PCS | Mod: S$GLB,,, | Performed by: NURSE PRACTITIONER

## 2021-02-04 RX ORDER — LISINOPRIL 40 MG/1
40 TABLET ORAL DAILY
Qty: 90 TABLET | Refills: 1 | Status: SHIPPED | OUTPATIENT
Start: 2021-02-04 | End: 2021-08-09 | Stop reason: SDUPTHER

## 2021-02-05 ENCOUNTER — DOCUMENTATION ONLY (OUTPATIENT)
Dept: PHARMACY | Facility: CLINIC | Age: 71
End: 2021-02-05

## 2021-02-22 DIAGNOSIS — E29.1 MALE HYPOGONADISM: ICD-10-CM

## 2021-02-22 RX ORDER — TESTOSTERONE CYPIONATE 200 MG/ML
200 INJECTION, SOLUTION INTRAMUSCULAR
Qty: 1 ML | Refills: 5 | Status: SHIPPED | OUTPATIENT
Start: 2021-02-22 | End: 2021-08-09 | Stop reason: SDUPTHER

## 2021-03-05 ENCOUNTER — DOCUMENTATION ONLY (OUTPATIENT)
Dept: PHARMACY | Facility: CLINIC | Age: 71
End: 2021-03-05

## 2021-04-06 ENCOUNTER — DOCUMENTATION ONLY (OUTPATIENT)
Dept: PHARMACY | Facility: CLINIC | Age: 71
End: 2021-04-06

## 2021-04-07 DIAGNOSIS — M19.90 ARTHRITIS: ICD-10-CM

## 2021-04-07 RX ORDER — MELOXICAM 15 MG/1
15 TABLET ORAL DAILY
Qty: 90 TABLET | Refills: 1 | Status: SHIPPED | OUTPATIENT
Start: 2021-04-07 | End: 2021-08-09 | Stop reason: SDUPTHER

## 2021-05-05 ENCOUNTER — DOCUMENTATION ONLY (OUTPATIENT)
Dept: PHARMACY | Facility: CLINIC | Age: 71
End: 2021-05-05

## 2021-06-17 ENCOUNTER — DOCUMENTATION ONLY (OUTPATIENT)
Dept: PHARMACY | Facility: CLINIC | Age: 71
End: 2021-06-17

## 2021-07-14 ENCOUNTER — DOCUMENTATION ONLY (OUTPATIENT)
Dept: PHARMACY | Facility: CLINIC | Age: 71
End: 2021-07-14

## 2021-07-28 ENCOUNTER — TELEPHONE (OUTPATIENT)
Dept: FAMILY MEDICINE | Facility: CLINIC | Age: 71
End: 2021-07-28

## 2021-08-08 LAB
ALBUMIN SERPL-MCNC: 4.3 G/DL (ref 3.6–5.1)
ALBUMIN/GLOB SERPL: 1.4 (CALC) (ref 1–2.5)
ALP SERPL-CCNC: 41 U/L (ref 35–144)
ALT SERPL-CCNC: 60 U/L (ref 9–46)
AST SERPL-CCNC: 42 U/L (ref 10–35)
BASOPHILS # BLD AUTO: 50 CELLS/UL (ref 0–200)
BASOPHILS NFR BLD AUTO: 0.8 %
BILIRUB SERPL-MCNC: 0.7 MG/DL (ref 0.2–1.2)
BUN SERPL-MCNC: 22 MG/DL (ref 7–25)
BUN/CREAT SERPL: ABNORMAL (CALC) (ref 6–22)
CALCIUM SERPL-MCNC: 9.4 MG/DL (ref 8.6–10.3)
CHLORIDE SERPL-SCNC: 102 MMOL/L (ref 98–110)
CHOLEST SERPL-MCNC: 153 MG/DL
CHOLEST/HDLC SERPL: 3.6 (CALC)
CO2 SERPL-SCNC: 27 MMOL/L (ref 20–32)
CREAT SERPL-MCNC: 0.93 MG/DL (ref 0.7–1.18)
EOSINOPHIL # BLD AUTO: 284 CELLS/UL (ref 15–500)
EOSINOPHIL NFR BLD AUTO: 4.5 %
ERYTHROCYTE [DISTWIDTH] IN BLOOD BY AUTOMATED COUNT: 13.7 % (ref 11–15)
GLOBULIN SER CALC-MCNC: 3 G/DL (CALC) (ref 1.9–3.7)
GLUCOSE SERPL-MCNC: 144 MG/DL (ref 65–99)
HCT VFR BLD AUTO: 44.7 % (ref 38.5–50)
HDLC SERPL-MCNC: 43 MG/DL
HGB BLD-MCNC: 14.8 G/DL (ref 13.2–17.1)
LDLC SERPL CALC-MCNC: 88 MG/DL (CALC)
LYMPHOCYTES # BLD AUTO: 1525 CELLS/UL (ref 850–3900)
LYMPHOCYTES NFR BLD AUTO: 24.2 %
MCH RBC QN AUTO: 30.6 PG (ref 27–33)
MCHC RBC AUTO-ENTMCNC: 33.1 G/DL (ref 32–36)
MCV RBC AUTO: 92.5 FL (ref 80–100)
MONOCYTES # BLD AUTO: 523 CELLS/UL (ref 200–950)
MONOCYTES NFR BLD AUTO: 8.3 %
NEUTROPHILS # BLD AUTO: 3919 CELLS/UL (ref 1500–7800)
NEUTROPHILS NFR BLD AUTO: 62.2 %
NONHDLC SERPL-MCNC: 110 MG/DL (CALC)
PLATELET # BLD AUTO: 176 THOUSAND/UL (ref 140–400)
PMV BLD REES-ECKER: 10.6 FL (ref 7.5–12.5)
POTASSIUM SERPL-SCNC: 4.2 MMOL/L (ref 3.5–5.3)
PROT SERPL-MCNC: 7.3 G/DL (ref 6.1–8.1)
PSA SERPL-MCNC: 1 NG/ML
RBC # BLD AUTO: 4.83 MILLION/UL (ref 4.2–5.8)
SODIUM SERPL-SCNC: 137 MMOL/L (ref 135–146)
TESTOST SERPL-MCNC: 43 NG/DL (ref 250–827)
TRIGL SERPL-MCNC: 128 MG/DL
WBC # BLD AUTO: 6.3 THOUSAND/UL (ref 3.8–10.8)

## 2021-08-09 ENCOUNTER — OFFICE VISIT (OUTPATIENT)
Dept: FAMILY MEDICINE | Facility: CLINIC | Age: 71
End: 2021-08-09
Payer: MEDICARE

## 2021-08-09 VITALS
WEIGHT: 277 LBS | SYSTOLIC BLOOD PRESSURE: 126 MMHG | HEIGHT: 69 IN | BODY MASS INDEX: 41.03 KG/M2 | DIASTOLIC BLOOD PRESSURE: 64 MMHG | HEART RATE: 76 BPM

## 2021-08-09 DIAGNOSIS — M19.90 ARTHRITIS: ICD-10-CM

## 2021-08-09 DIAGNOSIS — E29.1 MALE HYPOGONADISM: ICD-10-CM

## 2021-08-09 DIAGNOSIS — F33.42 RECURRENT MAJOR DEPRESSIVE DISORDER, IN FULL REMISSION: ICD-10-CM

## 2021-08-09 DIAGNOSIS — E78.2 MIXED HYPERLIPIDEMIA: ICD-10-CM

## 2021-08-09 DIAGNOSIS — L20.84 INTRINSIC ECZEMA: ICD-10-CM

## 2021-08-09 DIAGNOSIS — E11.65 TYPE 2 DIABETES MELLITUS WITH HYPERGLYCEMIA, WITHOUT LONG-TERM CURRENT USE OF INSULIN: ICD-10-CM

## 2021-08-09 DIAGNOSIS — R73.01 ELEVATED FASTING GLUCOSE: Primary | ICD-10-CM

## 2021-08-09 DIAGNOSIS — I10 ESSENTIAL HYPERTENSION: ICD-10-CM

## 2021-08-09 DIAGNOSIS — E66.01 CLASS 3 SEVERE OBESITY DUE TO EXCESS CALORIES WITH SERIOUS COMORBIDITY AND BODY MASS INDEX (BMI) OF 40.0 TO 44.9 IN ADULT: ICD-10-CM

## 2021-08-09 LAB — HBA1C MFR BLD: 6.4 %

## 2021-08-09 PROCEDURE — 99214 OFFICE O/P EST MOD 30 MIN: CPT | Mod: S$GLB,,, | Performed by: NURSE PRACTITIONER

## 2021-08-09 PROCEDURE — 83036 HEMOGLOBIN GLYCOSYLATED A1C: CPT | Mod: QW,,, | Performed by: NURSE PRACTITIONER

## 2021-08-09 PROCEDURE — 83036 POCT HEMOGLOBIN A1C: ICD-10-PCS | Mod: QW,,, | Performed by: NURSE PRACTITIONER

## 2021-08-09 PROCEDURE — 99214 PR OFFICE/OUTPT VISIT, EST, LEVL IV, 30-39 MIN: ICD-10-PCS | Mod: S$GLB,,, | Performed by: NURSE PRACTITIONER

## 2021-08-09 RX ORDER — METFORMIN HYDROCHLORIDE 500 MG/1
500 TABLET, EXTENDED RELEASE ORAL
Qty: 30 TABLET | Refills: 3 | Status: SHIPPED | OUTPATIENT
Start: 2021-08-09 | End: 2022-02-18 | Stop reason: SDUPTHER

## 2021-08-09 RX ORDER — CLOBETASOL PROPIONATE 0.5 MG/G
CREAM TOPICAL 2 TIMES DAILY
Qty: 45 G | Refills: 4 | Status: SHIPPED | OUTPATIENT
Start: 2021-08-09

## 2021-08-09 RX ORDER — MELOXICAM 15 MG/1
15 TABLET ORAL DAILY
Qty: 90 TABLET | Refills: 1 | Status: SHIPPED | OUTPATIENT
Start: 2021-08-09 | End: 2021-11-18 | Stop reason: SDUPTHER

## 2021-08-09 RX ORDER — ROSUVASTATIN CALCIUM 5 MG/1
5 TABLET, COATED ORAL DAILY
Qty: 90 TABLET | Refills: 1 | Status: SHIPPED | OUTPATIENT
Start: 2021-08-09 | End: 2022-02-18 | Stop reason: SDUPTHER

## 2021-08-09 RX ORDER — SERTRALINE HYDROCHLORIDE 50 MG/1
100 TABLET, FILM COATED ORAL NIGHTLY
Qty: 180 TABLET | Refills: 1 | Status: SHIPPED | OUTPATIENT
Start: 2021-08-09 | End: 2022-02-18 | Stop reason: SDUPTHER

## 2021-08-09 RX ORDER — LISINOPRIL 40 MG/1
40 TABLET ORAL DAILY
Qty: 90 TABLET | Refills: 1 | Status: SHIPPED | OUTPATIENT
Start: 2021-08-09 | End: 2022-02-18

## 2021-08-09 RX ORDER — NAPROXEN 500 MG/1
500 TABLET ORAL 2 TIMES DAILY PRN
Qty: 60 TABLET | Refills: 4 | Status: SHIPPED | OUTPATIENT
Start: 2021-08-09 | End: 2023-02-15

## 2021-08-09 RX ORDER — TESTOSTERONE CYPIONATE 200 MG/ML
200 INJECTION, SOLUTION INTRAMUSCULAR
Qty: 2 ML | Refills: 1 | Status: SHIPPED | OUTPATIENT
Start: 2021-08-09 | End: 2021-10-13 | Stop reason: SDUPTHER

## 2021-08-10 PROBLEM — E29.1 MALE HYPOGONADISM: Status: ACTIVE | Noted: 2021-08-10

## 2021-08-10 PROBLEM — E11.65 TYPE 2 DIABETES MELLITUS WITH HYPERGLYCEMIA, WITHOUT LONG-TERM CURRENT USE OF INSULIN: Status: ACTIVE | Noted: 2021-08-10

## 2021-08-10 PROBLEM — L20.84 INTRINSIC ECZEMA: Status: ACTIVE | Noted: 2021-08-10

## 2021-08-16 ENCOUNTER — DOCUMENTATION ONLY (OUTPATIENT)
Dept: PHARMACY | Facility: CLINIC | Age: 71
End: 2021-08-16

## 2021-09-13 ENCOUNTER — DOCUMENTATION ONLY (OUTPATIENT)
Dept: PHARMACY | Facility: CLINIC | Age: 71
End: 2021-09-13

## 2021-09-16 ENCOUNTER — TELEPHONE (OUTPATIENT)
Dept: FAMILY MEDICINE | Facility: CLINIC | Age: 71
End: 2021-09-16

## 2021-09-21 LAB — TESTOST SERPL-MCNC: 596 NG/DL (ref 250–827)

## 2021-09-28 ENCOUNTER — DOCUMENTATION ONLY (OUTPATIENT)
Dept: PHARMACY | Facility: CLINIC | Age: 71
End: 2021-09-28

## 2021-10-13 ENCOUNTER — DOCUMENTATION ONLY (OUTPATIENT)
Dept: PHARMACY | Facility: CLINIC | Age: 71
End: 2021-10-13

## 2021-10-13 DIAGNOSIS — E29.1 MALE HYPOGONADISM: ICD-10-CM

## 2021-10-13 RX ORDER — TESTOSTERONE CYPIONATE 200 MG/ML
200 INJECTION, SOLUTION INTRAMUSCULAR
Qty: 2 ML | Refills: 1 | Status: SHIPPED | OUTPATIENT
Start: 2021-10-13 | End: 2022-01-04 | Stop reason: SDUPTHER

## 2021-10-27 ENCOUNTER — DOCUMENTATION ONLY (OUTPATIENT)
Dept: PHARMACY | Facility: CLINIC | Age: 71
End: 2021-10-27
Payer: MEDICARE

## 2021-11-15 ENCOUNTER — DOCUMENTATION ONLY (OUTPATIENT)
Dept: PHARMACY | Facility: CLINIC | Age: 71
End: 2021-11-15
Payer: MEDICARE

## 2021-11-18 ENCOUNTER — OFFICE VISIT (OUTPATIENT)
Dept: FAMILY MEDICINE | Facility: CLINIC | Age: 71
End: 2021-11-18
Payer: MEDICARE

## 2021-11-18 VITALS
SYSTOLIC BLOOD PRESSURE: 130 MMHG | HEART RATE: 62 BPM | DIASTOLIC BLOOD PRESSURE: 64 MMHG | WEIGHT: 273 LBS | HEIGHT: 69 IN | BODY MASS INDEX: 40.43 KG/M2

## 2021-11-18 DIAGNOSIS — E11.65 TYPE 2 DIABETES MELLITUS WITH HYPERGLYCEMIA, WITHOUT LONG-TERM CURRENT USE OF INSULIN: Primary | ICD-10-CM

## 2021-11-18 DIAGNOSIS — M19.90 ARTHRITIS: ICD-10-CM

## 2021-11-18 DIAGNOSIS — I49.9 IRREGULAR CARDIAC RHYTHM: ICD-10-CM

## 2021-11-18 DIAGNOSIS — Z23 NEED FOR VACCINATION FOR PNEUMOCOCCUS: ICD-10-CM

## 2021-11-18 LAB
EKG 12-LEAD: NORMAL
HBA1C MFR BLD: 6.5 %
PR INTERVAL: NORMAL
PRT AXES: NORMAL
QRS DURATION: NORMAL
QT/QTC: NORMAL
VENTRICULAR RATE: NORMAL

## 2021-11-18 PROCEDURE — 99214 OFFICE O/P EST MOD 30 MIN: CPT | Mod: 25,S$GLB,, | Performed by: PHYSICIAN ASSISTANT

## 2021-11-18 PROCEDURE — 93000 ELECTROCARDIOGRAM COMPLETE: CPT | Mod: S$GLB,,, | Performed by: PHYSICIAN ASSISTANT

## 2021-11-18 PROCEDURE — 99214 PR OFFICE/OUTPT VISIT, EST, LEVL IV, 30-39 MIN: ICD-10-PCS | Mod: 25,S$GLB,, | Performed by: PHYSICIAN ASSISTANT

## 2021-11-18 PROCEDURE — 83036 HEMOGLOBIN GLYCOSYLATED A1C: CPT | Mod: QW,,, | Performed by: PHYSICIAN ASSISTANT

## 2021-11-18 PROCEDURE — 83036 POCT HEMOGLOBIN A1C: ICD-10-PCS | Mod: QW,,, | Performed by: PHYSICIAN ASSISTANT

## 2021-11-18 PROCEDURE — 93000 POCT EKG 12-LEAD: ICD-10-PCS | Mod: S$GLB,,, | Performed by: PHYSICIAN ASSISTANT

## 2021-11-18 RX ORDER — MELOXICAM 15 MG/1
15 TABLET ORAL DAILY
Qty: 90 TABLET | Refills: 1 | Status: SHIPPED | OUTPATIENT
Start: 2021-11-18 | End: 2022-05-11 | Stop reason: SDUPTHER

## 2021-11-29 ENCOUNTER — DOCUMENTATION ONLY (OUTPATIENT)
Dept: PHARMACY | Facility: CLINIC | Age: 71
End: 2021-11-29
Payer: MEDICARE

## 2021-12-15 ENCOUNTER — DOCUMENTATION ONLY (OUTPATIENT)
Dept: PHARMACY | Facility: CLINIC | Age: 71
End: 2021-12-15
Payer: MEDICARE

## 2021-12-17 DIAGNOSIS — E29.1 MALE HYPOGONADISM: ICD-10-CM

## 2021-12-17 RX ORDER — TESTOSTERONE CYPIONATE 200 MG/ML
200 INJECTION, SOLUTION INTRAMUSCULAR
Qty: 2 ML | Refills: 1 | Status: CANCELLED | OUTPATIENT
Start: 2021-12-17 | End: 2022-06-15

## 2021-12-22 DIAGNOSIS — E29.1 MALE HYPOGONADISM: ICD-10-CM

## 2021-12-22 RX ORDER — TESTOSTERONE CYPIONATE 200 MG/ML
200 INJECTION, SOLUTION INTRAMUSCULAR
Qty: 2 ML | Refills: 1 | OUTPATIENT
Start: 2021-12-22 | End: 2022-06-20

## 2022-01-04 ENCOUNTER — DOCUMENTATION ONLY (OUTPATIENT)
Dept: PHARMACY | Facility: CLINIC | Age: 72
End: 2022-01-04
Payer: MEDICARE

## 2022-01-04 DIAGNOSIS — E29.1 MALE HYPOGONADISM: ICD-10-CM

## 2022-01-04 RX ORDER — TESTOSTERONE CYPIONATE 200 MG/ML
200 INJECTION, SOLUTION INTRAMUSCULAR
Qty: 2 ML | Refills: 1 | Status: SHIPPED | OUTPATIENT
Start: 2022-01-04 | End: 2022-02-16 | Stop reason: SDUPTHER

## 2022-01-04 NOTE — TELEPHONE ENCOUNTER
----- Message from Ciera Patrick sent at 1/4/2022 12:32 PM CST -----  Patient called and stated that he is at the hospital and he went to get his testosterone shot and they advised that he need to contact the office please give him a call at [phone]

## 2022-01-19 ENCOUNTER — DOCUMENTATION ONLY (OUTPATIENT)
Dept: PHARMACY | Facility: CLINIC | Age: 72
End: 2022-01-19
Payer: MEDICARE

## 2022-02-02 ENCOUNTER — DOCUMENTATION ONLY (OUTPATIENT)
Dept: PHARMACY | Facility: CLINIC | Age: 72
End: 2022-02-02
Payer: MEDICARE

## 2022-02-09 ENCOUNTER — TELEPHONE (OUTPATIENT)
Dept: FAMILY MEDICINE | Facility: CLINIC | Age: 72
End: 2022-02-09
Payer: MEDICARE

## 2022-02-09 DIAGNOSIS — Z79.899 ENCOUNTER FOR LONG-TERM (CURRENT) USE OF OTHER MEDICATIONS: Primary | ICD-10-CM

## 2022-02-09 DIAGNOSIS — E78.2 MIXED HYPERLIPIDEMIA: ICD-10-CM

## 2022-02-09 DIAGNOSIS — E11.65 TYPE 2 DIABETES MELLITUS WITH HYPERGLYCEMIA, WITHOUT LONG-TERM CURRENT USE OF INSULIN: ICD-10-CM

## 2022-02-09 DIAGNOSIS — I10 ESSENTIAL HYPERTENSION: ICD-10-CM

## 2022-02-12 LAB
ALBUMIN SERPL-MCNC: 4.1 G/DL (ref 3.6–5.1)
ALBUMIN/CREAT UR: 70 MCG/MG CREAT
ALBUMIN/GLOB SERPL: 1.3 (CALC) (ref 1–2.5)
ALP SERPL-CCNC: 43 U/L (ref 35–144)
ALT SERPL-CCNC: 54 U/L (ref 9–46)
APPEARANCE UR: CLEAR
AST SERPL-CCNC: 44 U/L (ref 10–35)
BACTERIA #/AREA URNS HPF: ABNORMAL /HPF
BACTERIA UR CULT: ABNORMAL
BASOPHILS # BLD AUTO: 59 CELLS/UL (ref 0–200)
BASOPHILS NFR BLD AUTO: 0.7 %
BILIRUB SERPL-MCNC: 0.7 MG/DL (ref 0.2–1.2)
BILIRUB UR QL STRIP: NEGATIVE
BUN SERPL-MCNC: 16 MG/DL (ref 7–25)
BUN/CREAT SERPL: ABNORMAL (CALC) (ref 6–22)
CALCIUM SERPL-MCNC: 9.2 MG/DL (ref 8.6–10.3)
CHLORIDE SERPL-SCNC: 101 MMOL/L (ref 98–110)
CHOLEST SERPL-MCNC: 142 MG/DL
CHOLEST/HDLC SERPL: 3.3 (CALC)
CO2 SERPL-SCNC: 29 MMOL/L (ref 20–32)
COLOR UR: YELLOW
CREAT SERPL-MCNC: 0.85 MG/DL (ref 0.7–1.18)
CREAT UR-MCNC: 122 MG/DL (ref 20–320)
EOSINOPHIL # BLD AUTO: 336 CELLS/UL (ref 15–500)
EOSINOPHIL NFR BLD AUTO: 4 %
ERYTHROCYTE [DISTWIDTH] IN BLOOD BY AUTOMATED COUNT: 14 % (ref 11–15)
GLOBULIN SER CALC-MCNC: 3.2 G/DL (CALC) (ref 1.9–3.7)
GLUCOSE SERPL-MCNC: 143 MG/DL (ref 65–99)
GLUCOSE UR QL STRIP: NEGATIVE
HBA1C MFR BLD: 7.3 % OF TOTAL HGB
HCT VFR BLD AUTO: 49.2 % (ref 38.5–50)
HDLC SERPL-MCNC: 43 MG/DL
HGB BLD-MCNC: 16 G/DL (ref 13.2–17.1)
HGB UR QL STRIP: NEGATIVE
HYALINE CASTS #/AREA URNS LPF: ABNORMAL /LPF
KETONES UR QL STRIP: ABNORMAL
LDLC SERPL CALC-MCNC: 74 MG/DL (CALC)
LEUKOCYTE ESTERASE UR QL STRIP: NEGATIVE
LYMPHOCYTES # BLD AUTO: 1747 CELLS/UL (ref 850–3900)
LYMPHOCYTES NFR BLD AUTO: 20.8 %
MCH RBC QN AUTO: 30 PG (ref 27–33)
MCHC RBC AUTO-ENTMCNC: 32.5 G/DL (ref 32–36)
MCV RBC AUTO: 92.1 FL (ref 80–100)
MICROALBUMIN UR-MCNC: 8.6 MG/DL
MONOCYTES # BLD AUTO: 748 CELLS/UL (ref 200–950)
MONOCYTES NFR BLD AUTO: 8.9 %
NEUTROPHILS # BLD AUTO: 5510 CELLS/UL (ref 1500–7800)
NEUTROPHILS NFR BLD AUTO: 65.6 %
NITRITE UR QL STRIP: NEGATIVE
NONHDLC SERPL-MCNC: 99 MG/DL (CALC)
PH UR STRIP: 6 [PH] (ref 5–8)
PLATELET # BLD AUTO: 194 THOUSAND/UL (ref 140–400)
PMV BLD REES-ECKER: 11 FL (ref 7.5–12.5)
POTASSIUM SERPL-SCNC: 4.5 MMOL/L (ref 3.5–5.3)
PROT SERPL-MCNC: 7.3 G/DL (ref 6.1–8.1)
PROT UR QL STRIP: ABNORMAL
RBC # BLD AUTO: 5.34 MILLION/UL (ref 4.2–5.8)
RBC #/AREA URNS HPF: ABNORMAL /HPF
SODIUM SERPL-SCNC: 137 MMOL/L (ref 135–146)
SP GR UR STRIP: 1.02 (ref 1–1.03)
SQUAMOUS #/AREA URNS HPF: ABNORMAL /HPF
TRIGL SERPL-MCNC: 170 MG/DL
TSH SERPL-ACNC: 2.05 MIU/L (ref 0.4–4.5)
WBC # BLD AUTO: 8.4 THOUSAND/UL (ref 3.8–10.8)
WBC #/AREA URNS HPF: ABNORMAL /HPF

## 2022-02-16 DIAGNOSIS — E29.1 MALE HYPOGONADISM: ICD-10-CM

## 2022-02-16 RX ORDER — TESTOSTERONE CYPIONATE 200 MG/ML
200 INJECTION, SOLUTION INTRAMUSCULAR
Qty: 2 ML | Refills: 1 | Status: SHIPPED | OUTPATIENT
Start: 2022-02-16 | End: 2022-04-18 | Stop reason: SDUPTHER

## 2022-02-18 ENCOUNTER — OFFICE VISIT (OUTPATIENT)
Dept: FAMILY MEDICINE | Facility: CLINIC | Age: 72
End: 2022-02-18
Payer: MEDICARE

## 2022-02-18 VITALS
HEART RATE: 74 BPM | BODY MASS INDEX: 41.32 KG/M2 | DIASTOLIC BLOOD PRESSURE: 72 MMHG | HEIGHT: 69 IN | SYSTOLIC BLOOD PRESSURE: 142 MMHG | WEIGHT: 279 LBS

## 2022-02-18 DIAGNOSIS — F33.42 RECURRENT MAJOR DEPRESSIVE DISORDER, IN FULL REMISSION: ICD-10-CM

## 2022-02-18 DIAGNOSIS — K21.9 GASTROESOPHAGEAL REFLUX DISEASE, UNSPECIFIED WHETHER ESOPHAGITIS PRESENT: ICD-10-CM

## 2022-02-18 DIAGNOSIS — E78.2 MIXED HYPERLIPIDEMIA: ICD-10-CM

## 2022-02-18 DIAGNOSIS — Z23 NEED FOR STREPTOCOCCUS PNEUMONIAE VACCINATION: ICD-10-CM

## 2022-02-18 DIAGNOSIS — E11.65 TYPE 2 DIABETES MELLITUS WITH HYPERGLYCEMIA, WITHOUT LONG-TERM CURRENT USE OF INSULIN: ICD-10-CM

## 2022-02-18 DIAGNOSIS — I10 ESSENTIAL HYPERTENSION: Primary | ICD-10-CM

## 2022-02-18 PROCEDURE — G0009 PNEUMOCOCCAL POLYSACCHARIDE VACCINE 23-VALENT =>2YO SQ IM: ICD-10-PCS | Mod: S$GLB,,, | Performed by: PHYSICIAN ASSISTANT

## 2022-02-18 PROCEDURE — 90732 PPSV23 VACC 2 YRS+ SUBQ/IM: CPT | Mod: S$GLB,,, | Performed by: PHYSICIAN ASSISTANT

## 2022-02-18 PROCEDURE — 90732 PNEUMOCOCCAL POLYSACCHARIDE VACCINE 23-VALENT =>2YO SQ IM: ICD-10-PCS | Mod: S$GLB,,, | Performed by: PHYSICIAN ASSISTANT

## 2022-02-18 PROCEDURE — G0009 ADMIN PNEUMOCOCCAL VACCINE: HCPCS | Mod: S$GLB,,, | Performed by: PHYSICIAN ASSISTANT

## 2022-02-18 PROCEDURE — 99214 PR OFFICE/OUTPT VISIT, EST, LEVL IV, 30-39 MIN: ICD-10-PCS | Mod: S$GLB,,, | Performed by: PHYSICIAN ASSISTANT

## 2022-02-18 PROCEDURE — 99214 OFFICE O/P EST MOD 30 MIN: CPT | Mod: S$GLB,,, | Performed by: PHYSICIAN ASSISTANT

## 2022-02-18 RX ORDER — LISINOPRIL AND HYDROCHLOROTHIAZIDE 20; 25 MG/1; MG/1
1 TABLET ORAL DAILY
Qty: 90 TABLET | Refills: 3 | Status: SHIPPED | OUTPATIENT
Start: 2022-02-18 | End: 2022-05-11 | Stop reason: SDUPTHER

## 2022-02-18 RX ORDER — GLIPIZIDE 10 MG/1
10 TABLET ORAL
Qty: 90 TABLET | Refills: 1 | Status: SHIPPED | OUTPATIENT
Start: 2022-02-18 | End: 2022-05-11 | Stop reason: SDUPTHER

## 2022-02-18 RX ORDER — ROSUVASTATIN CALCIUM 5 MG/1
5 TABLET, COATED ORAL DAILY
Qty: 90 TABLET | Refills: 1 | Status: SHIPPED | OUTPATIENT
Start: 2022-02-18 | End: 2022-05-11 | Stop reason: SDUPTHER

## 2022-02-18 RX ORDER — ESOMEPRAZOLE MAGNESIUM 40 MG/1
40 CAPSULE, DELAYED RELEASE ORAL
Qty: 90 CAPSULE | Refills: 1 | Status: SHIPPED | OUTPATIENT
Start: 2022-02-18 | End: 2023-11-20

## 2022-02-18 RX ORDER — SERTRALINE HYDROCHLORIDE 50 MG/1
100 TABLET, FILM COATED ORAL NIGHTLY
Qty: 180 TABLET | Refills: 1 | Status: SHIPPED | OUTPATIENT
Start: 2022-02-18 | End: 2022-05-11 | Stop reason: SDUPTHER

## 2022-02-18 RX ORDER — METFORMIN HYDROCHLORIDE 500 MG/1
500 TABLET, EXTENDED RELEASE ORAL
Qty: 30 TABLET | Refills: 3 | Status: SHIPPED | OUTPATIENT
Start: 2022-02-18 | End: 2022-05-11 | Stop reason: SDUPTHER

## 2022-02-18 NOTE — PROGRESS NOTES
SUBJECTIVE:    Patient ID: Ciaran Canchola is a 71 y.o. male.    Chief Complaint: Hypertension (No bottles // BP elevated // Foot exam ordered //abc )    This is a 70 yo wm presenting for regular checkup and refills. Reports that he has been getting more elevated pressure readings at home lately. Today in clinic it does appear to be on the higher side of normal as well. Rechecked by me. Has not been able to fully tolerate the metformin due to GI upset. Interested in another agent in addition to this.      Telephone on 02/09/2022   Component Date Value Ref Range Status    WBC 02/11/2022 8.4  3.8 - 10.8 Thousand/uL Final    RBC 02/11/2022 5.34  4.20 - 5.80 Million/uL Final    Hemoglobin 02/11/2022 16.0  13.2 - 17.1 g/dL Final    Hematocrit 02/11/2022 49.2  38.5 - 50.0 % Final    MCV 02/11/2022 92.1  80.0 - 100.0 fL Final    MCH 02/11/2022 30.0  27.0 - 33.0 pg Final    MCHC 02/11/2022 32.5  32.0 - 36.0 g/dL Final    RDW 02/11/2022 14.0  11.0 - 15.0 % Final    Platelets 02/11/2022 194  140 - 400 Thousand/uL Final    MPV 02/11/2022 11.0  7.5 - 12.5 fL Final    Neutrophils, Abs 02/11/2022 5,510  1,500 - 7,800 cells/uL Final    Lymph # 02/11/2022 1,747  850 - 3,900 cells/uL Final    Mono # 02/11/2022 748  200 - 950 cells/uL Final    Eos # 02/11/2022 336  15 - 500 cells/uL Final    Baso # 02/11/2022 59  0 - 200 cells/uL Final    Neutrophils Relative 02/11/2022 65.6  % Final    Lymph % 02/11/2022 20.8  % Final    Mono % 02/11/2022 8.9  % Final    Eosinophil % 02/11/2022 4.0  % Final    Basophil % 02/11/2022 0.7  % Final    Glucose 02/11/2022 143* 65 - 99 mg/dL Final    BUN 02/11/2022 16  7 - 25 mg/dL Final    Creatinine 02/11/2022 0.85  0.70 - 1.18 mg/dL Final    eGFR if non African American 02/11/2022 88  > OR = 60 mL/min/1.73m2 Final    eGFR if  02/11/2022 102  > OR = 60 mL/min/1.73m2 Final    BUN/Creatinine Ratio 02/11/2022 NOT APPLICABLE  6 - 22 (calc) Final    Sodium  02/11/2022 137  135 - 146 mmol/L Final    Potassium 02/11/2022 4.5  3.5 - 5.3 mmol/L Final    Chloride 02/11/2022 101  98 - 110 mmol/L Final    CO2 02/11/2022 29  20 - 32 mmol/L Final    Calcium 02/11/2022 9.2  8.6 - 10.3 mg/dL Final    Total Protein 02/11/2022 7.3  6.1 - 8.1 g/dL Final    Albumin 02/11/2022 4.1  3.6 - 5.1 g/dL Final    Globulin, Total 02/11/2022 3.2  1.9 - 3.7 g/dL (calc) Final    Albumin/Globulin Ratio 02/11/2022 1.3  1.0 - 2.5 (calc) Final    Total Bilirubin 02/11/2022 0.7  0.2 - 1.2 mg/dL Final    Alkaline Phosphatase 02/11/2022 43  35 - 144 U/L Final    AST 02/11/2022 44* 10 - 35 U/L Final    ALT 02/11/2022 54* 9 - 46 U/L Final    Hemoglobin A1C 02/11/2022 7.3* <5.7 % of total Hgb Final    Cholesterol 02/11/2022 142  <200 mg/dL Final    HDL 02/11/2022 43  > OR = 40 mg/dL Final    Triglycerides 02/11/2022 170* <150 mg/dL Final    LDL Cholesterol 02/11/2022 74  mg/dL (calc) Final    HDL/Cholesterol Ratio 02/11/2022 3.3  <5.0 (calc) Final    Non HDL Chol. (LDL+VLDL) 02/11/2022 99  <130 mg/dL (calc) Final    Creatinine, Urine 02/11/2022 122  20 - 320 mg/dL Final    Microalb, Ur 02/11/2022 8.6  See Note: mg/dL Final    Microalb/Creat Ratio 02/11/2022 70* <30 mcg/mg creat Final    TSH w/reflex to FT4 02/11/2022 2.05  0.40 - 4.50 mIU/L Final    Color, UA 02/11/2022 YELLOW  YELLOW Final    Appearance, UA 02/11/2022 CLEAR  CLEAR Final    Specific Gravity, UA 02/11/2022 1.025  1.001 - 1.035 Final    pH, UA 02/11/2022 6.0  5.0 - 8.0 Final    Glucose, UA 02/11/2022 NEGATIVE  NEGATIVE Final    Bilirubin, UA 02/11/2022 NEGATIVE  NEGATIVE Final    Ketones, UA 02/11/2022 TRACE* NEGATIVE Final    Occult Blood UA 02/11/2022 NEGATIVE  NEGATIVE Final    Protein, UA 02/11/2022 TRACE* NEGATIVE Final    Nitrite, UA 02/11/2022 NEGATIVE  NEGATIVE Final    Leukocytes, UA 02/11/2022 NEGATIVE  NEGATIVE Final    WBC Casts, UA 02/11/2022 NONE SEEN  < OR = 5 /HPF Final    RBC Casts, UA  02/11/2022 0-2  < OR = 2 /HPF Final    Squam Epithel, UA 02/11/2022 NONE SEEN  < OR = 5 /HPF Final    Bacteria, UA 02/11/2022 NONE SEEN  NONE SEEN /HPF Final    Hyaline Casts, UA 02/11/2022 NONE SEEN  NONE SEEN /LPF Final    Reflexive Urine Culture 02/11/2022    Final   Office Visit on 11/18/2021   Component Date Value Ref Range Status    Hemoglobin A1C 11/18/2021 6.5  % Final    EKG 12-Lead 11/18/2021    Final       Past Medical History:   Diagnosis Date    Depression     Hyperlipidemia      Past Surgical History:   Procedure Laterality Date    CHOLECYSTECTOMY      COLONOSCOPY  2018    RTC in 5 years. done in [place]    TONSILLECTOMY      UMBILICAL HERNIA REPAIR       Family History   Problem Relation Age of Onset    ALS Mother     Heart disease Father     Heart disease Sister        Marital Status:   Alcohol History:  reports current alcohol use.  Tobacco History:  reports that he quit smoking about 37 years ago. His smoking use included cigarettes. He has never used smokeless tobacco.  Drug History:  reports no history of drug use.    Review of patient's allergies indicates:  No Known Allergies    Current Outpatient Medications:     clobetasoL (TEMOVATE) 0.05 % cream, Apply topically 2 (two) times daily. Stop when rash is gone and skin is smooth and not itchy., Disp: 60 g, Rfl: 1    clobetasoL (TEMOVATE) 0.05 % cream, Apply topically 2 (two) times daily., Disp: 45 g, Rfl: 4    meloxicam (MOBIC) 15 MG tablet, Take 1 tablet (15 mg total) by mouth once daily., Disp: 90 tablet, Rfl: 1    naproxen (NAPROSYN) 500 MG tablet, Take 1 tablet (500 mg total) by mouth 2 (two) times daily as needed., Disp: 60 tablet, Rfl: 4    testosterone cypionate (DEPOTESTOTERONE CYPIONATE) 200 mg/mL injection, Inject 1 mL (200 mg total) into the muscle every 14 (fourteen) days., Disp: 2 mL, Rfl: 1    esomeprazole (NEXIUM) 40 MG capsule, Take 1 capsule (40 mg total) by mouth before breakfast., Disp: 90 capsule,  "Rfl: 1    glipiZIDE (GLUCOTROL) 10 MG tablet, Take 1 tablet (10 mg total) by mouth daily with breakfast., Disp: 90 tablet, Rfl: 1    lisinopriL-hydrochlorothiazide (PRINZIDE,ZESTORETIC) 20-25 mg Tab, Take 1 tablet by mouth once daily., Disp: 90 tablet, Rfl: 3    metFORMIN (GLUCOPHAGE-XR) 500 MG ER 24hr tablet, Take 1 tablet (500 mg total) by mouth daily with breakfast., Disp: 30 tablet, Rfl: 3    rosuvastatin (CRESTOR) 5 MG tablet, Take 1 tablet (5 mg total) by mouth once daily., Disp: 90 tablet, Rfl: 1    sertraline (ZOLOFT) 50 MG tablet, Take 2 tablets (100 mg total) by mouth every evening., Disp: 180 tablet, Rfl: 1    Review of Systems   Constitutional: Negative for diaphoresis, fatigue and unexpected weight change.   HENT: Negative for sinus pain and sore throat.    Eyes: Negative for pain, redness and visual disturbance.   Respiratory: Negative for cough, chest tightness, shortness of breath and wheezing.    Cardiovascular: Negative for chest pain, palpitations and leg swelling.   Gastrointestinal: Negative for abdominal pain, blood in stool, diarrhea, nausea and vomiting.   Endocrine: Negative for polydipsia, polyphagia and polyuria.   Genitourinary: Negative for dysuria, frequency and urgency.   Musculoskeletal: Negative for arthralgias, back pain and myalgias.   Skin: Negative for rash.   Allergic/Immunologic: Negative for environmental allergies.   Neurological: Negative for dizziness, syncope and headaches.   Psychiatric/Behavioral: Negative for suicidal ideas.          Objective:      Vitals:    02/18/22 0834 02/18/22 0837 02/18/22 0920   BP: (!) 166/86 (!) 170/88 (!) 142/72   Pulse: 72  74   Weight: 126.6 kg (279 lb)     Height: 5' 9" (1.753 m)       Physical Exam  Vitals and nursing note reviewed.   Constitutional:       General: He is awake. He is not in acute distress.     Appearance: Normal appearance. He is obese. He is not ill-appearing.   HENT:      Head: Normocephalic and atraumatic.      " Right Ear: External ear normal.      Left Ear: External ear normal.      Nose: Nose normal.      Mouth/Throat:      Lips: Pink.      Mouth: Mucous membranes are moist.      Dentition: Normal dentition.      Pharynx: Oropharynx is clear. Uvula midline.      Tonsils: No tonsillar exudate.   Eyes:      General: Vision grossly intact. Gaze aligned appropriately.      Extraocular Movements: Extraocular movements intact.      Conjunctiva/sclera: Conjunctivae normal.   Neck:      Thyroid: No thyroid mass.   Cardiovascular:      Rate and Rhythm: Normal rate. Rhythm irregular. Occasional extrasystoles are present.     Pulses: Normal pulses.           Radial pulses are 2+ on the right side and 2+ on the left side.        Dorsalis pedis pulses are 2+ on the right side and 2+ on the left side.      Heart sounds: Normal heart sounds, S1 normal and S2 normal. No murmur heard.      Pulmonary:      Effort: Pulmonary effort is normal. No accessory muscle usage or respiratory distress.      Breath sounds: Normal breath sounds and air entry. No wheezing or rales.   Abdominal:      General: Abdomen is flat. Bowel sounds are normal. There is no distension.      Tenderness: There is no abdominal tenderness.   Musculoskeletal:      Right lower leg: No edema.      Left lower leg: No edema.        Feet:    Feet:      Right foot:      Skin integrity: Callus present.      Toenail Condition: Right toenails are abnormally thick.      Left foot:      Skin integrity: Callus and dry skin present.      Toenail Condition: Left toenails are abnormally thick.      Comments: Tested with neuropen. Thick calluses on heel and ball of foot bilaterally. Did not sense on calluses bilaterally, did sense on non-callused areas.  Skin:     General: Skin is warm and dry.      Capillary Refill: Capillary refill takes less than 2 seconds.      Coloration: Skin is not pale.   Neurological:      General: No focal deficit present.      Mental Status: He is alert and  oriented to person, place, and time.   Psychiatric:         Attention and Perception: Attention normal.         Mood and Affect: Mood normal.         Behavior: Behavior is cooperative.           Assessment:       1. Essential hypertension    2. Type 2 diabetes mellitus with hyperglycemia, without long-term current use of insulin    3. Recurrent major depressive disorder, in full remission    4. Mixed hyperlipidemia    5. Gastroesophageal reflux disease, unspecified whether esophagitis present         Plan:       Essential hypertension  Comments:  intensify lisinopril with HCTZ. monitor pressure at home and will f/u in 3 mos to reassess.  Orders:  -     lisinopriL-hydrochlorothiazide (PRINZIDE,ZESTORETIC) 20-25 mg Tab; Take 1 tablet by mouth once daily.  Dispense: 90 tablet; Refill: 3    Type 2 diabetes mellitus with hyperglycemia, without long-term current use of insulin  Comments:  A1c has crept up to 7.3% today. will need to add glipizide now in addition to metformin (only sproadically takes due to GI upset), f/u in 3 mos.  Orders:  -     Foot Exam Performed  -     metFORMIN (GLUCOPHAGE-XR) 500 MG ER 24hr tablet; Take 1 tablet (500 mg total) by mouth daily with breakfast.  Dispense: 30 tablet; Refill: 3  -     glipiZIDE (GLUCOTROL) 10 MG tablet; Take 1 tablet (10 mg total) by mouth daily with breakfast.  Dispense: 90 tablet; Refill: 1    Recurrent major depressive disorder, in full remission  -     sertraline (ZOLOFT) 50 MG tablet; Take 2 tablets (100 mg total) by mouth every evening.  Dispense: 180 tablet; Refill: 1    Mixed hyperlipidemia  -     rosuvastatin (CRESTOR) 5 MG tablet; Take 1 tablet (5 mg total) by mouth once daily.  Dispense: 90 tablet; Refill: 1    Gastroesophageal reflux disease, unspecified whether esophagitis present  Comments:  nexium qd in rx form. If not covered says that he will remain on otc  Orders:  -     esomeprazole (NEXIUM) 40 MG capsule; Take 1 capsule (40 mg total) by mouth before  breakfast.  Dispense: 90 capsule; Refill: 1      No follow-ups on file.        2/18/2022 Sanjay Tse PA-C

## 2022-02-18 NOTE — PATIENT INSTRUCTIONS
"Patient Education       Diabetes and Diet   The Basics   Written by the doctors and editors at [facility]   Why is diet important in diabetes? -- Diet is important because it is part of diabetes treatment. Many people need to change what they eat and how much they eat to help treat their diabetes. It is important for people to treat their diabetes so that they:  · Keep their blood sugar at or near a normal level  · Prevent long-term problems, such as heart or kidney problems, that can happen in people with diabetes  Changing your diet can also help treat obesity, high blood pressure, and high cholesterol. These conditions can affect people with diabetes and can lead to future problems, such as heart attacks or strokes.  Who will work with me to change my diet? -- Your doctor or nurse will work with you to make a food plan to change your diet. They might also recommend that you work with a "dietitian." A dietitian is an expert on food and eating.  Do I need to eat at the same times every day? -- When and how often you should eat depends, in part, on the diabetes medicines you take. For example:  · People who take about the same amount of insulin at the same time each day (called a "fixed regimen") should eat meals at the same times. This is also true for people who take pills that increase insulin levels, such as sulfonylureas. Eating meals at the same time every day helps prevent low blood sugar.  · People who adjust the dose and timing of their insulin each day (called a "flexible regimen") do not always have to eat meals at the same time. That's because they can time their insulin dose for before they plan to eat, and also adjust the dose for how much they plan to eat.  · People who take medicines that don't usually cause low blood sugar, such as metformin, don't have to eat meals at the same time every day.  What do I need to think about when planning what to eat? -- Our bodies break down the food we eat into small " "pieces called carbohydrates, proteins, and fats.  When planning what to eat, people with diabetes need to think about:  · Carbohydrates (or "carbs") - Carbohydrates, which are sugars that our bodies use for energy, can raise a person's blood sugar level. Your doctor, nurse, or dietitian will tell you how many carbohydrates you should eat at each meal or snack. Foods that have carbohydrates include:  ? Bread, pasta, and rice  ? Vegetables and fruits  ? Dairy foods  ? Foods and drinks with added sugar  It is best to get your carbohydrates from fruits, vegetables, whole grains, and low-fat milk. It is best to avoid drinks with added sugar, like soda, juices, and sports drinks.   · Protein - Your doctor, nurse, or dietitian will tell you how much protein you should eat each day. It is best to eat lean meats, fish, eggs, beans, peas, soy products, nuts, and seeds.  · Fats - The type of fat you eat is more important than the amount of fat. "Saturated" and "trans" fats can increase your risk for heart problems, like a heart attack.  ? Foods that have saturated fats include meat, butter, cheese, and ice cream.  ? Foods that have trans fats include processed food with "partially hydrogenated oils" on the ingredient list. This may include fried foods, store bought cookies, muffins, pies, and cakes.  "Monounsaturated" and "polyunsaturated" fats are better for you. Foods with these types of fat include fish, avocado, olive oil, and nuts.  · Calories - People need to eat a certain amount of calories each day to keep their weight the same. People who are overweight and want to lose weight need to eat fewer calories each day.  · Fiber - Eating foods with a lot of fiber can help control a person's blood sugar level. Foods that have a lot of fiber include apples, green beans, peas, beans, lentils, nuts, oatmeal, and whole grains.  · Salt - People who have high blood pressure should not eat foods that contain a lot of salt (also " called sodium). People with high blood pressure should also eat healthy foods, such as fruits, vegetables, and low-fat dairy foods.  · Alcohol - Having more than 1 drink (for women) or 2 drinks (for men) a day can raise blood sugar levels. Also, drinks that have fruit juice or soda in them can raise blood sugar levels.  What can I do if I need to lose weight? -- If you need to lose weight, you can:  · Exercise - Try to get at least 30 minutes of physical activity a day, most days of the week. Even gentle exercise, like walking, is good for your health. Some people with diabetes need to change their medicine dose before they exercise. They might also need to check their blood sugar levels before and after exercising.  · Eat fewer calories - Your doctor, nurse, or dietitian can tell you how many calories you should eat each day in order to lose weight.  If you are worried about your weight, size, or shape, talk with your doctor, nurse, or dietitian. They can help you make changes to improve your health.  Can I eat the same foods as my family? -- Yes. You do not need to eat special foods if you have diabetes. You and your family can eat the same foods. Changing your diet is mostly about eating healthy foods and not eating too much.  What are the other parts of diabetes treatment? -- Besides changing your diet, the other parts of diabetes treatment are:  · Exercise  · Medicines  Some people with diabetes need to learn how to match their diet and exercise with their medicine dose. For example, people who use insulin might need to choose the dose of insulin they give themselves. To choose their dose, they need to think about:  · What they plan to eat at the next meal  · How much exercise they plan to do  · What their blood sugar level is  If the diet and exercise do not match the medicine dose, a person's blood sugar level can get too low or too high. Blood sugar levels that are too low or too high can cause  problems.  All topics are updated as new evidence becomes available and our peer review process is complete.  This topic retrieved from [employer] on: Sep 21, 2021.  Topic 29722 Version 7.0  Release: 29.4.2 - C29.263  © 2021 UpToDate, Inc. and/or its affiliates. All rights reserved.  Consumer Information Use and Disclaimer   This information is not specific medical advice and does not replace information you receive from your health care provider. This is only a brief summary of general information. It does NOT include all information about conditions, illnesses, injuries, tests, procedures, treatments, therapies, discharge instructions or life-style choices that may apply to you. You must talk with your health care provider for complete information about your health and treatment options. This information should not be used to decide whether or not to accept your health care provider's advice, instructions or recommendations. Only your health care provider has the knowledge and training to provide advice that is right for you. The use of this information is governed by the [employer] End User License Agreement, available at https://www.Clipmarks/en/solutions/Contract Live/about/desiree.The use of [employer] content is governed by the [employer] Terms of Use. ©2021 Up[employer]Date, Inc. All rights reserved.  Copyright   © 2021 Up[employer]Date, Inc. and/or its affiliates. All rights reserved.  Patient Education       Heart Healthy Diet   General   With a heart healthy food plan, you will learn to make better food choices. This diet may help you lower your blood cholesterol level, manage your blood pressure, and lower your risk for heart problems. Smaller portions may also be helpful.  Sodium is a type of mineral found in many foods. It helps keep the balance of fluids in your body. Too much sodium can raise your blood pressure. It can also make you take on extra water. This is called edema. Pay careful attention to how much salt or sodium  is in your food. You may need to avoid salt or eat foods with less sodium.  Cholesterol is a fat-like, waxy substance in your blood. It is normal to have some cholesterol in your blood because your body makes it. You also get extra cholesterol from all animal products. These are foods like meats, eggs, and dairy products. Too much cholesterol in your blood can block or damage your blood vessels. This can lead to a heart attack or stroke.  Fats in your food have calories which give energy. Not all fats are bad. Some fats are healthy, like the fat found in fish, nuts, and olive oil. These are called unsaturated fats. They help manage body functions and lower cholesterol levels. Learn about the best fats to use in your diet and where to use them. Eating too much fat may make you more likely to weigh more than is healthy. This raises your risk of many heart problems.  Fiber is found in plants. Meat and dairy products do not have fiber in them. Fiber can help you lower your unhealthy cholesterol level. You may need more water as you eat more fiber so you do not get hard stools.  What lifestyle changes are needed?   Eat a healthy diet and workout often. Try to use as many calories as you take in each day.  What changes to diet are needed?   · Eat oily fish at least 2 times a week. These are fish like tuna, salmon, and mackerel.  · Limit sodium to no more than 2,300 mg of sodium per day. This is about 1 teaspoon (5 grams) of table salt. Use little or no salt when making food. Try other spices or seasoning instead.  · Limit how much cholesterol you eat to less than 300 mg per day. You can do this by having lean meats. Also eat lots of fruits, vegetables, and fat-free and low-fat dairy products.  · Limit how much trans fats you eat. Trans fats are found in many processed foods like stick margarine, shortening, and some fried foods. Also, lower how much hydrogenated fats you eat. They are used to make pastries, biscuits,  cookies, crackers, chips, and many snack foods.  · Have no more than 1 drink per day of beer, wine, and mixed drinks (alcohol).  Who should use this diet?   A heart healthy diet is good for everyone.  What foods are good to eat?   · Grains: Try to eat 6 to 8 servings of whole grain, high fiber foods each day. These are whole grain bread, cereals, brown rice, or pasta.  · Fruits and vegetables: Eat 4 to 5 servings each day. Try to pick many kinds and colors. Try to eat more that are fresh or frozen. Look for low sodium or salt-free if you choose canned. Rinse canned items before cooking or eating. Dried peas, beans, and lentils are also good.  · Dairy: Choose low fat (1%) or fat-free milk. Eat nonfat or low-fat products.  · Protein: Try to eat more low fat or lean meats like chicken and turkey. Eat less red meat and eat more fish, eggs, egg whites, and beans instead.  · Fats: Use good fats found in fish, nuts, and avocados. Try using olive oil, canola oil, and low-sodium and low-fat salad dressing and mayonnaise. Use corn, safflower, sunflower, and soybean oils.  · Condiments: Use low-sodium or salt-free broths, soups, soy sauce, and condiments. Pepper, herbs, spices, vinegar, lemon or lime juices are great for seasoning. Sugar, cocoa powder, honey, syrup, and jams may be eaten in small amounts.  · Sweets: Low-fat, trans fat-free cookies, cakes, and pies; bertha crackers; animal crackers; low-fat fig bars; and latricia snaps.     What foods should be limited or avoided?   · Grains: Salted breads, rolls, crackers, quick breads, self-rising flours, biscuit mixes, regular bread crumbs, instant hot cereals, commercially-prepared rice, pasta, stuffing mixes  · Fruits and vegetables: Commercially-prepared potatoes and vegetable mixes, regular canned vegetables and juices, vegetables frozen with sauce or pickled vegetables, processed fruits with added sugar or salt  · Dairy: Whole milk, malted milk, chocolate milk,  buttermilk  · Protein: Smoked, cured, salted, or canned meat, fish, or poultry such as olsen and sausages  · Fats: Cut back on solid fats like butter, lard, and margarine.  · Condiments and snacks: Salted and canned peas, beans, and olives; salted snack foods; fried foods; soda, juices, or other sweetened drinks; commercially-softened water. Miso, salsa, ketchup, barbecue sauce, Worcestershire sauce, soy sauce, and teriyaki sauce are also high in salt.  · Sweets: High-fat baked goods such as muffins, donuts, pastries, commercial baked goods  Helpful tips   · When you go to a grocery store, have a list or a meal plan. Do not shop when you are hungry to avoid cravings for foods.  · You need to know about the sodium and fat content of the food you eat. Read food labels with care. They will show you how much of each is in a serving. This amount is given as a percentage of the total amount you need each day. Reading the labels will help you make healthy food choices.     · Avoid fast foods.  · Watch your portions when eating out. Split an order or bring home half for another meal.     · Talk to a dietitian for help.  Where can I learn more?   American Academy of [employer] Physicians  https://familydoctor.org/diet-and-exercise-for-a-healthy-heart/   American Heart Association  https://www.heart.org/en/healthy-living/healthy-eating/eat-smart/nutrition-basics/aha-diet-and-lifestyle-recommendations   NHS  https://www.nhs.uk/live-well/eat-well/   Last Reviewed Date   2020-03-27  Consumer Information Use and Disclaimer   This information is not specific medical advice and does not replace information you receive from your health care provider. This is only a brief summary of general information. It does NOT include all information about conditions, illnesses, injuries, tests, procedures, treatments, therapies, discharge instructions or life-style choices that may apply to you. You must talk with your health care provider for  complete information about your health and treatment options. This information should not be used to decide whether or not to accept your health care providers advice, instructions or recommendations. Only your health care provider has the knowledge and training to provide advice that is right for you.  Copyright   Copyright © 2021 [employer] and its affiliates and/or licensors. All rights reserved.

## 2022-03-02 ENCOUNTER — DOCUMENTATION ONLY (OUTPATIENT)
Dept: PHARMACY | Facility: CLINIC | Age: 72
End: 2022-03-02
Payer: MEDICARE

## 2022-03-02 NOTE — PROGRESS NOTES
Administered 200mg testosterone into the right upper gluteal muscle on 3/2/2022.     LOT [id number] EXP 05/2023

## 2022-03-15 ENCOUNTER — DOCUMENTATION ONLY (OUTPATIENT)
Dept: PHARMACY | Facility: CLINIC | Age: 72
End: 2022-03-15
Payer: MEDICARE

## 2022-03-22 ENCOUNTER — TELEPHONE (OUTPATIENT)
Dept: FAMILY MEDICINE | Facility: CLINIC | Age: 72
End: 2022-03-22
Payer: MEDICARE

## 2022-03-22 NOTE — TELEPHONE ENCOUNTER
----- Message from Sowmya Wilcox sent at 3/22/2022 11:21 AM CDT -----  Pt calling back to a message about rescheduling an appt he has with Bushra in May.  I offered to r/s him with Matt or another provider said if cannot see Bushra he will only see Donato grimes [phone]

## 2022-03-22 NOTE — TELEPHONE ENCOUNTER
----- Message from Sowmya Wilcox sent at 3/22/2022 11:21 AM CDT -----  Pt calling back to a message about rescheduling an appt he has with Bushra in May.  I offered to r/s him with Matt or another provider said if cannot see Bushra he will only see Donato grimes [phone]

## 2022-03-31 ENCOUNTER — DOCUMENTATION ONLY (OUTPATIENT)
Dept: PHARMACY | Facility: CLINIC | Age: 72
End: 2022-03-31
Payer: MEDICARE

## 2022-04-13 ENCOUNTER — DOCUMENTATION ONLY (OUTPATIENT)
Dept: PHARMACY | Facility: CLINIC | Age: 72
End: 2022-04-13
Payer: MEDICARE

## 2022-04-18 DIAGNOSIS — E29.1 MALE HYPOGONADISM: ICD-10-CM

## 2022-04-18 RX ORDER — TESTOSTERONE CYPIONATE 200 MG/ML
200 INJECTION, SOLUTION INTRAMUSCULAR
Qty: 2 ML | Refills: 1 | Status: SHIPPED | OUTPATIENT
Start: 2022-04-18 | End: 2022-06-02 | Stop reason: SDUPTHER

## 2022-04-27 ENCOUNTER — DOCUMENTATION ONLY (OUTPATIENT)
Dept: PHARMACY | Facility: CLINIC | Age: 72
End: 2022-04-27
Payer: MEDICARE

## 2022-05-09 ENCOUNTER — TELEPHONE (OUTPATIENT)
Dept: FAMILY MEDICINE | Facility: CLINIC | Age: 72
End: 2022-05-09

## 2022-05-09 DIAGNOSIS — E11.65 TYPE 2 DIABETES MELLITUS WITH HYPERGLYCEMIA, WITHOUT LONG-TERM CURRENT USE OF INSULIN: ICD-10-CM

## 2022-05-09 DIAGNOSIS — R79.89 LOW TESTOSTERONE LEVEL IN MALE: ICD-10-CM

## 2022-05-09 DIAGNOSIS — Z79.899 ENCOUNTER FOR LONG-TERM (CURRENT) USE OF OTHER MEDICATIONS: Primary | ICD-10-CM

## 2022-05-10 LAB
HBA1C MFR BLD: 7.2 % OF TOTAL HGB
TESTOST SERPL-MCNC: 398 NG/DL (ref 250–827)

## 2022-05-11 ENCOUNTER — DOCUMENTATION ONLY (OUTPATIENT)
Dept: PHARMACY | Facility: CLINIC | Age: 72
End: 2022-05-11
Payer: MEDICARE

## 2022-05-11 ENCOUNTER — OFFICE VISIT (OUTPATIENT)
Dept: FAMILY MEDICINE | Facility: CLINIC | Age: 72
End: 2022-05-11
Payer: MEDICARE

## 2022-05-11 VITALS
BODY MASS INDEX: 40.14 KG/M2 | WEIGHT: 271 LBS | SYSTOLIC BLOOD PRESSURE: 136 MMHG | DIASTOLIC BLOOD PRESSURE: 84 MMHG | HEART RATE: 72 BPM | HEIGHT: 69 IN

## 2022-05-11 DIAGNOSIS — E78.2 MIXED HYPERLIPIDEMIA: ICD-10-CM

## 2022-05-11 DIAGNOSIS — I10 ESSENTIAL HYPERTENSION: ICD-10-CM

## 2022-05-11 DIAGNOSIS — F33.42 RECURRENT MAJOR DEPRESSIVE DISORDER, IN FULL REMISSION: ICD-10-CM

## 2022-05-11 DIAGNOSIS — E11.65 TYPE 2 DIABETES MELLITUS WITH HYPERGLYCEMIA, WITHOUT LONG-TERM CURRENT USE OF INSULIN: ICD-10-CM

## 2022-05-11 DIAGNOSIS — M77.8 RIGHT SHOULDER TENDONITIS: Primary | ICD-10-CM

## 2022-05-11 DIAGNOSIS — M19.90 ARTHRITIS: ICD-10-CM

## 2022-05-11 PROCEDURE — 99214 PR OFFICE/OUTPT VISIT, EST, LEVL IV, 30-39 MIN: ICD-10-PCS | Mod: S$GLB,,, | Performed by: PHYSICIAN ASSISTANT

## 2022-05-11 PROCEDURE — 99214 OFFICE O/P EST MOD 30 MIN: CPT | Mod: S$GLB,,, | Performed by: PHYSICIAN ASSISTANT

## 2022-05-11 RX ORDER — GLIPIZIDE 10 MG/1
10 TABLET ORAL
Qty: 90 TABLET | Refills: 1 | Status: SHIPPED | OUTPATIENT
Start: 2022-05-11 | End: 2023-02-15 | Stop reason: SDUPTHER

## 2022-05-11 RX ORDER — METFORMIN HYDROCHLORIDE 500 MG/1
500 TABLET, EXTENDED RELEASE ORAL
Qty: 90 TABLET | Refills: 1 | Status: SHIPPED | OUTPATIENT
Start: 2022-05-11 | End: 2023-02-15

## 2022-05-11 RX ORDER — SERTRALINE HYDROCHLORIDE 50 MG/1
100 TABLET, FILM COATED ORAL NIGHTLY
Qty: 180 TABLET | Refills: 1 | Status: SHIPPED | OUTPATIENT
Start: 2022-05-11 | End: 2023-02-15 | Stop reason: SDUPTHER

## 2022-05-11 RX ORDER — MELOXICAM 15 MG/1
15 TABLET ORAL DAILY
Qty: 90 TABLET | Refills: 1 | Status: SHIPPED | OUTPATIENT
Start: 2022-05-11 | End: 2022-10-19 | Stop reason: SDUPTHER

## 2022-05-11 RX ORDER — ROSUVASTATIN CALCIUM 5 MG/1
5 TABLET, COATED ORAL DAILY
Qty: 90 TABLET | Refills: 1 | Status: SHIPPED | OUTPATIENT
Start: 2022-05-11 | End: 2023-11-20

## 2022-05-11 RX ORDER — LISINOPRIL AND HYDROCHLOROTHIAZIDE 20; 25 MG/1; MG/1
1 TABLET ORAL DAILY
Qty: 90 TABLET | Refills: 1 | Status: SHIPPED | OUTPATIENT
Start: 2022-05-11 | End: 2022-11-07

## 2022-05-11 NOTE — LETTER
[address]  [place] [address]  Phone: [phone]   [phone]                        MD Asa Dior MD Chequita Williams, MD Matthew Bassett, PAJULIETA Harmon, BETTINA Nieves, BETTINA Castillo, BETTINA Braga PA-C      Date: 05/11/2022        Patient: Ciaran Canchola  YOB: 1950      Please fax a copy of pt's recent eye exam.        Sincerely,     Luisana Maxwell MA

## 2022-05-11 NOTE — PROGRESS NOTES
"  SUBJECTIVE:    Patient ID: Ciaran Canchola is a 71 y.o. male.    Chief Complaint: Follow-up (No bottles/ diabetic f/u/ would like heart listened to/ eye exam at [facility]/ )    Pt is a 71 y.o.male who presents today for a 3 month HTN and DM follow-up.  3 months ago, he saw Sanjay Tse PA-C and was started on Prinzide 20-25mg q.d. for BP control.  BP at home has been ranging in the 130's/80's mmHg.  BP today in office is within this range and is well controlled.  He denies any frequent headaches, dizziness, CP, or palpitations.    Regarding his DM, he is currently on metformin 500 mg q.d. and glipizide 10 mg q.d., but does not take these medications as prescribed.  Currently, he is only taking them 3x/week secondary to diarrhea side effects.  He does not routinely check his blood sugar at home, so no log provided today.  Last A1c (5/9/22) was well controlled at 7.2%.    Today, he reports bilateral shoulder pain, right > left and neck "tightness." The symptoms started gradually 2 months ago gradually worsen.  They are now constantly present and described as a dull, 2/10 "soreness," nothing tends to exacerbate the pain and he does not recall any exciting event that could cause this pain.  Currently, he is on Mobic 15mg and reports is efficacious at alleviating some his pain.  He denies any recent falls or trauma.    Telephone on 05/09/2022   Component Date Value Ref Range Status    TESTOSTERONE, TOTAL, MALE 05/09/2022 398  250 - 827 ng/dL Final    Hemoglobin A1C 05/09/2022 7.2 (A) <5.7 % of total Hgb Final   Telephone on 02/09/2022   Component Date Value Ref Range Status    WBC 02/11/2022 8.4  3.8 - 10.8 Thousand/uL Final    RBC 02/11/2022 5.34  4.20 - 5.80 Million/uL Final    Hemoglobin 02/11/2022 16.0  13.2 - 17.1 g/dL Final    Hematocrit 02/11/2022 49.2  38.5 - 50.0 % Final    MCV 02/11/2022 92.1  80.0 - 100.0 fL Final    MCH 02/11/2022 30.0  27.0 - 33.0 pg Final    MCHC 02/11/2022 32.5  32.0 - 36.0 g/dL " Final    RDW 02/11/2022 14.0  11.0 - 15.0 % Final    Platelets 02/11/2022 194  140 - 400 Thousand/uL Final    MPV 02/11/2022 11.0  7.5 - 12.5 fL Final    Neutrophils, Abs 02/11/2022 5,510  1,500 - 7,800 cells/uL Final    Lymph # 02/11/2022 1,747  850 - 3,900 cells/uL Final    Mono # 02/11/2022 748  200 - 950 cells/uL Final    Eos # 02/11/2022 336  15 - 500 cells/uL Final    Baso # 02/11/2022 59  0 - 200 cells/uL Final    Neutrophils Relative 02/11/2022 65.6  % Final    Lymph % 02/11/2022 20.8  % Final    Mono % 02/11/2022 8.9  % Final    Eosinophil % 02/11/2022 4.0  % Final    Basophil % 02/11/2022 0.7  % Final    Glucose 02/11/2022 143 (A) 65 - 99 mg/dL Final    BUN 02/11/2022 16  7 - 25 mg/dL Final    Creatinine 02/11/2022 0.85  0.70 - 1.18 mg/dL Final    eGFR if non African American 02/11/2022 88  > OR = 60 mL/min/1.73m2 Final    eGFR if  02/11/2022 102  > OR = 60 mL/min/1.73m2 Final    BUN/Creatinine Ratio 02/11/2022 NOT APPLICABLE  6 - 22 (calc) Final    Sodium 02/11/2022 137  135 - 146 mmol/L Final    Potassium 02/11/2022 4.5  3.5 - 5.3 mmol/L Final    Chloride 02/11/2022 101  98 - 110 mmol/L Final    CO2 02/11/2022 29  20 - 32 mmol/L Final    Calcium 02/11/2022 9.2  8.6 - 10.3 mg/dL Final    Total Protein 02/11/2022 7.3  6.1 - 8.1 g/dL Final    Albumin 02/11/2022 4.1  3.6 - 5.1 g/dL Final    Globulin, Total 02/11/2022 3.2  1.9 - 3.7 g/dL (calc) Final    Albumin/Globulin Ratio 02/11/2022 1.3  1.0 - 2.5 (calc) Final    Total Bilirubin 02/11/2022 0.7  0.2 - 1.2 mg/dL Final    Alkaline Phosphatase 02/11/2022 43  35 - 144 U/L Final    AST 02/11/2022 44 (A) 10 - 35 U/L Final    ALT 02/11/2022 54 (A) 9 - 46 U/L Final    Hemoglobin A1C 02/11/2022 7.3 (A) <5.7 % of total Hgb Final    Cholesterol 02/11/2022 142  <200 mg/dL Final    HDL 02/11/2022 43  > OR = 40 mg/dL Final    Triglycerides 02/11/2022 170 (A) <150 mg/dL Final    LDL Cholesterol 02/11/2022 74  mg/dL  (calc) Final    HDL/Cholesterol Ratio 02/11/2022 3.3  <5.0 (calc) Final    Non HDL Chol. (LDL+VLDL) 02/11/2022 99  <130 mg/dL (calc) Final    Creatinine, Urine 02/11/2022 122  20 - 320 mg/dL Final    Microalb, Ur 02/11/2022 8.6  See Note: mg/dL Final    Microalb/Creat Ratio 02/11/2022 70 (A) <30 mcg/mg creat Final    TSH w/reflex to FT4 02/11/2022 2.05  0.40 - 4.50 mIU/L Final    Color, UA 02/11/2022 YELLOW  YELLOW Final    Appearance, UA 02/11/2022 CLEAR  CLEAR Final    Specific Gravity, UA 02/11/2022 1.025  1.001 - 1.035 Final    pH, UA 02/11/2022 6.0  5.0 - 8.0 Final    Glucose, UA 02/11/2022 NEGATIVE  NEGATIVE Final    Bilirubin, UA 02/11/2022 NEGATIVE  NEGATIVE Final    Ketones, UA 02/11/2022 TRACE (A) NEGATIVE Final    Occult Blood UA 02/11/2022 NEGATIVE  NEGATIVE Final    Protein, UA 02/11/2022 TRACE (A) NEGATIVE Final    Nitrite, UA 02/11/2022 NEGATIVE  NEGATIVE Final    Leukocytes, UA 02/11/2022 NEGATIVE  NEGATIVE Final    WBC Casts, UA 02/11/2022 NONE SEEN  < OR = 5 /HPF Final    RBC Casts, UA 02/11/2022 0-2  < OR = 2 /HPF Final    Squam Epithel, UA 02/11/2022 NONE SEEN  < OR = 5 /HPF Final    Bacteria, UA 02/11/2022 NONE SEEN  NONE SEEN /HPF Final    Hyaline Casts, UA 02/11/2022 NONE SEEN  NONE SEEN /LPF Final    Reflexive Urine Culture 02/11/2022    Final   Office Visit on 11/18/2021   Component Date Value Ref Range Status    Hemoglobin A1C 11/18/2021 6.5  % Final    EKG 12-Lead 11/18/2021    Final       Past Medical History:   Diagnosis Date    Depression     Hyperlipidemia      Past Surgical History:   Procedure Laterality Date    CHOLECYSTECTOMY      COLONOSCOPY  2018    RTC in 5 years. done in [place]    TONSILLECTOMY      UMBILICAL HERNIA REPAIR       Family History   Problem Relation Age of Onset    ALS Mother     Heart disease Father     Heart disease Sister        Marital Status:   Alcohol History:  reports current alcohol use.  Tobacco History:  reports  that he quit smoking about 37 years ago. His smoking use included cigarettes. He has never used smokeless tobacco.  Drug History:  reports no history of drug use.    Health Maintenance Topics with due status: Not Due       Topic Last Completion Date    Influenza Vaccine 11/18/2020    Colorectal Cancer Screening 10/06/2021    Diabetes Urine Screening 02/11/2022    Lipid Panel 02/11/2022    Foot Exam 02/18/2022    Hemoglobin A1c 05/09/2022    Low Dose Statin 05/11/2022     Immunization History   Administered Date(s) Administered    COVID-19, MRNA, LN-S, PF (MODERNA FULL 0.5 ML DOSE) 02/03/2021, 03/03/2021, 10/25/2021    Hepatitis A, Adult 09/16/2005    Influenza 11/18/2020    Influenza (FLUAD) - Trivalent - Adjuvanted - PF (65+) 12/06/2019    Pneumococcal Conjugate - 13 Valent 08/13/2020    Pneumococcal Polysaccharide - 23 Valent 02/18/2022    Td (ADULT) 09/16/2005       Review of patient's allergies indicates:  No Known Allergies    Current Outpatient Medications:     clobetasoL (TEMOVATE) 0.05 % cream, Apply topically 2 (two) times daily. Stop when rash is gone and skin is smooth and not itchy., Disp: 60 g, Rfl: 1    clobetasoL (TEMOVATE) 0.05 % cream, Apply topically 2 (two) times daily., Disp: 45 g, Rfl: 4    esomeprazole (NEXIUM) 40 MG capsule, Take 1 capsule (40 mg total) by mouth before breakfast., Disp: 90 capsule, Rfl: 1    glipiZIDE (GLUCOTROL) 10 MG tablet, Take 1 tablet (10 mg total) by mouth daily with breakfast., Disp: 90 tablet, Rfl: 1    lisinopriL-hydrochlorothiazide (PRINZIDE,ZESTORETIC) 20-25 mg Tab, Take 1 tablet by mouth once daily., Disp: 90 tablet, Rfl: 1    meloxicam (MOBIC) 15 MG tablet, Take 1 tablet (15 mg total) by mouth once daily., Disp: 90 tablet, Rfl: 1    metFORMIN (GLUCOPHAGE-XR) 500 MG ER 24hr tablet, Take 1 tablet (500 mg total) by mouth daily with breakfast., Disp: 90 tablet, Rfl: 1    naproxen (NAPROSYN) 500 MG tablet, Take 1 tablet (500 mg total) by mouth 2 (two)  "times daily as needed., Disp: 60 tablet, Rfl: 4    rosuvastatin (CRESTOR) 5 MG tablet, Take 1 tablet (5 mg total) by mouth once daily., Disp: 90 tablet, Rfl: 1    sertraline (ZOLOFT) 50 MG tablet, Take 2 tablets (100 mg total) by mouth every evening., Disp: 180 tablet, Rfl: 1    testosterone cypionate (DEPOTESTOTERONE CYPIONATE) 200 mg/mL injection, Inject 1 mL (200 mg total) into the muscle every 14 (fourteen) days., Disp: 2 mL, Rfl: 1    Review of Systems   Constitutional: Negative for activity change, chills, fatigue and fever.   Respiratory: Negative for cough, shortness of breath and wheezing.    Cardiovascular: Negative for chest pain and palpitations.   Gastrointestinal: Negative for abdominal pain, constipation, diarrhea, nausea and vomiting.   Genitourinary: Negative for dysuria and hematuria.   Musculoskeletal: Positive for arthralgias and myalgias. Negative for gait problem.   Skin: Negative for rash.   Neurological: Negative for dizziness, syncope, weakness, light-headedness and headaches.   Psychiatric/Behavioral: Negative for behavioral problems.          Objective:      Vitals:    05/11/22 0839   BP: 136/84   Pulse: 72   Weight: 122.9 kg (271 lb)   Height: 5' 9" (1.753 m)     Physical Exam  Vitals and nursing note reviewed.   Constitutional:       General: He is not in acute distress.     Appearance: Normal appearance. He is obese. He is not ill-appearing, toxic-appearing or diaphoretic.      Comments: Morbidly obese WM sitting erect in office chair in no acute distress.   HENT:      Head: Normocephalic and atraumatic.      Right Ear: External ear normal.      Left Ear: External ear normal.      Nose: Nose normal. No rhinorrhea.      Mouth/Throat:      Mouth: Mucous membranes are moist.      Pharynx: Oropharynx is clear.   Eyes:      General: No scleral icterus.     Extraocular Movements: Extraocular movements intact.      Conjunctiva/sclera: Conjunctivae normal.   Cardiovascular:      Rate and " Rhythm: Normal rate and regular rhythm.      Pulses: Normal pulses.      Heart sounds: Normal heart sounds. No murmur heard.    No friction rub.   Pulmonary:      Effort: Pulmonary effort is normal. No respiratory distress.      Breath sounds: Normal breath sounds. No wheezing, rhonchi or rales.   Abdominal:      General: There is no distension.      Palpations: Abdomen is soft.      Tenderness: There is no abdominal tenderness. There is no guarding or rebound.   Musculoskeletal:         General: Normal range of motion.      Cervical back: Normal range of motion and neck supple.      Right lower leg: No edema.      Left lower leg: No edema.      Comments: Bilateral shoulders have full active ROM.  5/5 strength noted in the bilateral upper extremities against resistance.  Positive Neer impingement test of the right shoulder.   Skin:     General: Skin is warm and dry.      Coloration: Skin is not jaundiced.      Findings: No bruising, erythema or rash.   Neurological:      Mental Status: He is alert. Mental status is at baseline.      Gait: Gait normal.   Psychiatric:         Mood and Affect: Mood normal.         Behavior: Behavior normal. Behavior is cooperative.           Assessment:       1. Right shoulder tendonitis    2. Essential hypertension    3. Type 2 diabetes mellitus with hyperglycemia, without long-term current use of insulin    4. Arthritis    5. Mixed hyperlipidemia    6. Recurrent major depressive disorder, in full remission           Plan:       Right shoulder tendonitis  Comments:  Rest and avoid any heavy lifting.  Apply ice when stationary.  Continue Mobic 15mg q.d..  Begin physical therapy - amb ref printed and patient will call insurance to find out what facilities are covered in [place].    Orders:  -     Ambulatory referral/consult to Physical/Occupational Therapy; Future; Expected date: 05/11/2022    Essential hypertension  Comments:  Currently stable and well controlled.  Continue as is.   Refill sent today.  Orders:  -     lisinopriL-hydrochlorothiazide (PRINZIDE,ZESTORETIC) 20-25 mg Tab; Take 1 tablet by mouth once daily.  Dispense: 90 tablet; Refill: 1    Type 2 diabetes mellitus with hyperglycemia, without long-term current use of insulin  Comments:  A1c: 7.2% today.   Stressed importance of taking his prescription medications as prescribed.  Continue as is, but take daily.  Orders:  -     glipiZIDE (GLUCOTROL) 10 MG tablet; Take 1 tablet (10 mg total) by mouth daily with breakfast.  Dispense: 90 tablet; Refill: 1  -     metFORMIN (GLUCOPHAGE-XR) 500 MG ER 24hr tablet; Take 1 tablet (500 mg total) by mouth daily with breakfast.  Dispense: 90 tablet; Refill: 1    Arthritis  -     meloxicam (MOBIC) 15 MG tablet; Take 1 tablet (15 mg total) by mouth once daily.  Dispense: 90 tablet; Refill: 1    Mixed hyperlipidemia  -     rosuvastatin (CRESTOR) 5 MG tablet; Take 1 tablet (5 mg total) by mouth once daily.  Dispense: 90 tablet; Refill: 1    Recurrent major depressive disorder, in full remission  -     sertraline (ZOLOFT) 50 MG tablet; Take 2 tablets (100 mg total) by mouth every evening.  Dispense: 180 tablet; Refill: 1      Follow up in about 3 months (around 8/11/2022) for Diabetic Check-Up, HTN.        5/11/2022 Matt Braga PA-C

## 2022-05-17 ENCOUNTER — DOCUMENTATION ONLY (OUTPATIENT)
Dept: PHARMACY | Facility: CLINIC | Age: 72
End: 2022-05-17
Payer: MEDICARE

## 2022-06-02 ENCOUNTER — DOCUMENTATION ONLY (OUTPATIENT)
Dept: PHARMACY | Facility: CLINIC | Age: 72
End: 2022-06-02
Payer: MEDICARE

## 2022-06-02 DIAGNOSIS — E29.1 MALE HYPOGONADISM: ICD-10-CM

## 2022-06-02 RX ORDER — TESTOSTERONE CYPIONATE 200 MG/ML
200 INJECTION, SOLUTION INTRAMUSCULAR
Qty: 2 ML | Refills: 1 | Status: SHIPPED | OUTPATIENT
Start: 2022-06-02 | End: 2022-08-02 | Stop reason: SDUPTHER

## 2022-06-17 ENCOUNTER — DOCUMENTATION ONLY (OUTPATIENT)
Dept: PHARMACY | Facility: CLINIC | Age: 72
End: 2022-06-17
Payer: MEDICARE

## 2022-06-29 ENCOUNTER — DOCUMENTATION ONLY (OUTPATIENT)
Dept: PHARMACY | Facility: CLINIC | Age: 72
End: 2022-06-29
Payer: MEDICARE

## 2022-07-15 ENCOUNTER — DOCUMENTATION ONLY (OUTPATIENT)
Dept: PHARMACY | Facility: CLINIC | Age: 72
End: 2022-07-15
Payer: MEDICARE

## 2022-07-29 ENCOUNTER — DOCUMENTATION ONLY (OUTPATIENT)
Dept: PHARMACY | Facility: CLINIC | Age: 72
End: 2022-07-29
Payer: MEDICARE

## 2022-08-02 DIAGNOSIS — E29.1 MALE HYPOGONADISM: ICD-10-CM

## 2022-08-02 RX ORDER — TESTOSTERONE CYPIONATE 200 MG/ML
200 INJECTION, SOLUTION INTRAMUSCULAR
Qty: 2 ML | Refills: 1 | Status: SHIPPED | OUTPATIENT
Start: 2022-08-02 | End: 2022-09-27 | Stop reason: SDUPTHER

## 2022-08-11 ENCOUNTER — DOCUMENTATION ONLY (OUTPATIENT)
Dept: PHARMACY | Facility: CLINIC | Age: 72
End: 2022-08-11
Payer: MEDICARE

## 2022-08-24 ENCOUNTER — OFFICE VISIT (OUTPATIENT)
Dept: FAMILY MEDICINE | Facility: CLINIC | Age: 72
End: 2022-08-24
Payer: MEDICARE

## 2022-08-24 VITALS
DIASTOLIC BLOOD PRESSURE: 64 MMHG | WEIGHT: 270 LBS | BODY MASS INDEX: 39.99 KG/M2 | TEMPERATURE: 98 F | SYSTOLIC BLOOD PRESSURE: 136 MMHG | HEART RATE: 64 BPM | HEIGHT: 69 IN

## 2022-08-24 DIAGNOSIS — E11.65 TYPE 2 DIABETES MELLITUS WITH HYPERGLYCEMIA, WITHOUT LONG-TERM CURRENT USE OF INSULIN: Primary | ICD-10-CM

## 2022-08-24 DIAGNOSIS — I10 ESSENTIAL HYPERTENSION: ICD-10-CM

## 2022-08-24 DIAGNOSIS — K21.9 GASTROESOPHAGEAL REFLUX DISEASE, UNSPECIFIED WHETHER ESOPHAGITIS PRESENT: ICD-10-CM

## 2022-08-24 DIAGNOSIS — E78.2 MIXED HYPERLIPIDEMIA: ICD-10-CM

## 2022-08-24 LAB — HBA1C MFR BLD: 7 %

## 2022-08-24 PROCEDURE — 99214 OFFICE O/P EST MOD 30 MIN: CPT | Mod: S$GLB,,, | Performed by: PHYSICIAN ASSISTANT

## 2022-08-24 PROCEDURE — 83036 HEMOGLOBIN GLYCOSYLATED A1C: CPT | Mod: QW,,, | Performed by: PHYSICIAN ASSISTANT

## 2022-08-24 PROCEDURE — 83036 POCT HEMOGLOBIN A1C: ICD-10-PCS | Mod: QW,,, | Performed by: PHYSICIAN ASSISTANT

## 2022-08-24 PROCEDURE — 99214 PR OFFICE/OUTPT VISIT, EST, LEVL IV, 30-39 MIN: ICD-10-PCS | Mod: S$GLB,,, | Performed by: PHYSICIAN ASSISTANT

## 2022-08-24 NOTE — PROGRESS NOTES
SUBJECTIVE:    Patient ID: Ciaran Canchola is a 71 y.o. male.    Chief Complaint: Diabetes (Went over meds verbally// SW)    Pt is a 71-year-old male who presents today for a 3 month DM II follow up.  Currently, he does not follow any particular diet and averages 2 meals/day.  His main source of exercise is daily walks with his dog for 1-2 miles/day.  He is currently managed on Metformin 500mg and Glipizide 10mg q.o.d. He does not routinely check his blood sugar at home, so no log was provided today.  A1c today has improved to 7.0%, was 7.2% in May. He denies any hypoglycemic events.  Overall, he reports feeling well and is without complaints today.    Regarding his history of HTN, BP remains stable and well controlled.  BP averages in the 130s/60s-70s mmHg at home.  He denies any CP, palpitations, or syncopal episodes.    UTD: Foot Exam (2/18/22)  UTD: Eye exam (04/13/2022) - Dr. Morales  UTD:  Colonoscopy (10/06/2021) - RTC 5 years    Office Visit on 08/24/2022   Component Date Value Ref Range Status    Hemoglobin A1C 08/24/2022 7.0  % Final   Telephone on 05/09/2022   Component Date Value Ref Range Status    TESTOSTERONE, TOTAL, MALE 05/09/2022 398  250 - 827 ng/dL Final    Hemoglobin A1C 05/09/2022 7.2 (A) <5.7 % of total Hgb Final       Past Medical History:   Diagnosis Date    Depression     Hyperlipidemia      Past Surgical History:   Procedure Laterality Date    CHOLECYSTECTOMY      COLONOSCOPY  2018    RTC in 5 years. done in [place]    TONSILLECTOMY      UMBILICAL HERNIA REPAIR       Family History   Problem Relation Age of Onset    ALS Mother     Heart disease Father     Heart disease Sister        Marital Status:   Alcohol History:  reports current alcohol use.  Tobacco History:  reports that he quit smoking about 37 years ago. His smoking use included cigarettes. He has never used smokeless tobacco.  Drug History:  reports no history of drug use.    Health Maintenance Topics with due  status: Not Due       Topic Last Completion Date    Influenza Vaccine 11/18/2020    Colorectal Cancer Screening 10/06/2021    Diabetes Urine Screening 02/11/2022    Lipid Panel 02/11/2022    Foot Exam 02/18/2022    Eye Exam 04/13/2022    Low Dose Statin 08/24/2022    Hemoglobin A1c 08/24/2022     Immunization History   Administered Date(s) Administered    COVID-19, MRNA, LN-S, PF (MODERNA FULL 0.5 ML DOSE) 02/03/2021, 03/03/2021, 10/25/2021    Hepatitis A, Adult 09/16/2005    Influenza 11/18/2020    Influenza (FLUAD) - Trivalent - Adjuvanted - PF (65+) 12/06/2019    Pneumococcal Conjugate - 13 Valent 08/13/2020    Pneumococcal Polysaccharide - 23 Valent 02/18/2022    Td (ADULT) 09/16/2005       Review of patient's allergies indicates:  No Known Allergies    Current Outpatient Medications:     clobetasoL (TEMOVATE) 0.05 % cream, Apply topically 2 (two) times daily., Disp: 45 g, Rfl: 4    esomeprazole (NEXIUM) 40 MG capsule, Take 1 capsule (40 mg total) by mouth before breakfast., Disp: 90 capsule, Rfl: 1    glipiZIDE (GLUCOTROL) 10 MG tablet, Take 1 tablet (10 mg total) by mouth daily with breakfast., Disp: 90 tablet, Rfl: 1    lisinopriL-hydrochlorothiazide (PRINZIDE,ZESTORETIC) 20-25 mg Tab, Take 1 tablet by mouth once daily., Disp: 90 tablet, Rfl: 1    meloxicam (MOBIC) 15 MG tablet, Take 1 tablet (15 mg total) by mouth once daily., Disp: 90 tablet, Rfl: 1    metFORMIN (GLUCOPHAGE-XR) 500 MG ER 24hr tablet, Take 1 tablet (500 mg total) by mouth daily with breakfast., Disp: 90 tablet, Rfl: 1    naproxen (NAPROSYN) 500 MG tablet, Take 1 tablet (500 mg total) by mouth 2 (two) times daily as needed., Disp: 60 tablet, Rfl: 4    rosuvastatin (CRESTOR) 5 MG tablet, Take 1 tablet (5 mg total) by mouth once daily., Disp: 90 tablet, Rfl: 1    sertraline (ZOLOFT) 50 MG tablet, Take 2 tablets (100 mg total) by mouth every evening., Disp: 180 tablet, Rfl: 1    testosterone cypionate (DEPOTESTOTERONE CYPIONATE)  "200 mg/mL injection, Inject 1 mL (200 mg total) into the muscle every 14 (fourteen) days., Disp: 2 mL, Rfl: 1    Review of Systems   Constitutional: Negative for activity change, chills, fatigue and fever.   Respiratory: Negative for cough, shortness of breath and wheezing.    Cardiovascular: Negative for chest pain, palpitations and leg swelling.   Gastrointestinal: Negative for abdominal pain, constipation, diarrhea, nausea and vomiting.   Genitourinary: Negative for difficulty urinating, dysuria, frequency and urgency.   Musculoskeletal: Negative for arthralgias and myalgias.   Skin: Negative for rash.   Neurological: Negative for dizziness, syncope and light-headedness.   Psychiatric/Behavioral: Negative for behavioral problems.          Objective:      Vitals:    08/24/22 0829   BP: 136/64   Pulse: 64   Temp: 98.1 °F (36.7 °C)   Weight: 122.5 kg (270 lb)   Height: 5' 9" (1.753 m)     Physical Exam  Vitals and nursing note reviewed.   Constitutional:       General: He is not in acute distress.     Appearance: Normal appearance. He is obese. He is not ill-appearing, toxic-appearing or diaphoretic.      Comments: Morbidly obese WM sitting erect in office chair in no acute distress.   HENT:      Head: Normocephalic and atraumatic.      Right Ear: External ear normal.      Left Ear: External ear normal.      Nose: Nose normal. No rhinorrhea.      Mouth/Throat:      Mouth: Mucous membranes are moist.      Pharynx: Oropharynx is clear.   Eyes:      General: No scleral icterus.     Extraocular Movements: Extraocular movements intact.      Conjunctiva/sclera: Conjunctivae normal.   Cardiovascular:      Rate and Rhythm: Normal rate and regular rhythm.      Pulses: Normal pulses.      Heart sounds: Normal heart sounds. No murmur heard.    No friction rub.   Pulmonary:      Effort: Pulmonary effort is normal. No respiratory distress.      Breath sounds: Normal breath sounds. No wheezing, rhonchi or rales.   Abdominal:      " General: There is no distension.      Palpations: Abdomen is soft.      Tenderness: There is no abdominal tenderness. There is no guarding or rebound.   Musculoskeletal:         General: Normal range of motion.      Cervical back: Normal range of motion and neck supple.      Right lower leg: No edema.      Left lower leg: No edema.      Comments: Bilateral shoulders have full active ROM.  5/5 strength noted in the bilateral upper and lower extremities.   Skin:     General: Skin is warm and dry.      Coloration: Skin is not jaundiced.      Findings: No bruising, erythema or rash.   Neurological:      Mental Status: He is alert. Mental status is at baseline.      Gait: Gait normal.   Psychiatric:         Mood and Affect: Mood normal.         Behavior: Behavior normal. Behavior is cooperative.           Assessment:       1. Type 2 diabetes mellitus with hyperglycemia, without long-term current use of insulin    2. Essential hypertension    3. Mixed hyperlipidemia    4. Gastroesophageal reflux disease, unspecified whether esophagitis present           Plan:       Type 2 diabetes mellitus with hyperglycemia, without long-term current use of insulin  POCT A1c today: 7.0%, improved form 7.2% on last visit.   Continue current treatment regimen as is - Metformin 500mg and Glipizide 10mg q.o.d.    No refills requested today.  Diabetic eye exam and foot exam are both UTD.  Patient is actively on statin and ACEi therapy to optimize his diabetic management.  -     Urinalysis, Reflex to Urine Culture Urine, Clean Catch; Future; Expected date: 08/24/2022  -     Hemoglobin A1C, POCT    Essential hypertension  Stable and controlled.  Continue as is.  No refills requested today.  -     CBC Auto Differential; Future; Expected date: 08/24/2022    Mixed hyperlipidemia  Will obtain updated lipid panel and CMP today, as patient is currently fasting.  Continue current treatment regimen as is.    No refills requested today.  -      Comprehensive Metabolic Panel; Future; Expected date: 08/24/2022  -     Lipid Panel; Future; Expected date: 08/24/2022    Gastroesophageal reflux disease, unspecified whether esophagitis present      Follow up in about 6 months (around 2/24/2023) for Diabetic Check-Up, With labs prior to visit.        8/24/2022 Matt Braga PA-C

## 2022-08-25 LAB
ALBUMIN SERPL-MCNC: 4.3 G/DL (ref 3.6–5.1)
ALBUMIN/GLOB SERPL: 1.4 (CALC) (ref 1–2.5)
ALP SERPL-CCNC: 37 U/L (ref 35–144)
ALT SERPL-CCNC: 48 U/L (ref 9–46)
AST SERPL-CCNC: 35 U/L (ref 10–35)
BASOPHILS # BLD AUTO: 50 CELLS/UL (ref 0–200)
BASOPHILS NFR BLD AUTO: 0.6 %
BILIRUB SERPL-MCNC: 0.6 MG/DL (ref 0.2–1.2)
BUN SERPL-MCNC: 25 MG/DL (ref 7–25)
BUN/CREAT SERPL: ABNORMAL (CALC) (ref 6–22)
CALCIUM SERPL-MCNC: 10 MG/DL (ref 8.6–10.3)
CHLORIDE SERPL-SCNC: 100 MMOL/L (ref 98–110)
CHOLEST SERPL-MCNC: 135 MG/DL
CHOLEST/HDLC SERPL: 3.9 (CALC)
CO2 SERPL-SCNC: 27 MMOL/L (ref 20–32)
CREAT SERPL-MCNC: 1.26 MG/DL (ref 0.7–1.28)
EGFR: 61 ML/MIN/1.73M2
EOSINOPHIL # BLD AUTO: 294 CELLS/UL (ref 15–500)
EOSINOPHIL NFR BLD AUTO: 3.5 %
ERYTHROCYTE [DISTWIDTH] IN BLOOD BY AUTOMATED COUNT: 14.4 % (ref 11–15)
GLOBULIN SER CALC-MCNC: 3 G/DL (CALC) (ref 1.9–3.7)
GLUCOSE SERPL-MCNC: 133 MG/DL (ref 65–99)
HCT VFR BLD AUTO: 46.9 % (ref 38.5–50)
HDLC SERPL-MCNC: 35 MG/DL
HGB BLD-MCNC: 15.4 G/DL (ref 13.2–17.1)
LDLC SERPL CALC-MCNC: 66 MG/DL (CALC)
LYMPHOCYTES # BLD AUTO: 1814 CELLS/UL (ref 850–3900)
LYMPHOCYTES NFR BLD AUTO: 21.6 %
MCH RBC QN AUTO: 30.3 PG (ref 27–33)
MCHC RBC AUTO-ENTMCNC: 32.8 G/DL (ref 32–36)
MCV RBC AUTO: 92.3 FL (ref 80–100)
MONOCYTES # BLD AUTO: 756 CELLS/UL (ref 200–950)
MONOCYTES NFR BLD AUTO: 9 %
NEUTROPHILS # BLD AUTO: 5485 CELLS/UL (ref 1500–7800)
NEUTROPHILS NFR BLD AUTO: 65.3 %
NONHDLC SERPL-MCNC: 100 MG/DL (CALC)
PLATELET # BLD AUTO: 210 THOUSAND/UL (ref 140–400)
PMV BLD REES-ECKER: 11 FL (ref 7.5–12.5)
POTASSIUM SERPL-SCNC: 4.3 MMOL/L (ref 3.5–5.3)
PROT SERPL-MCNC: 7.3 G/DL (ref 6.1–8.1)
RBC # BLD AUTO: 5.08 MILLION/UL (ref 4.2–5.8)
SODIUM SERPL-SCNC: 136 MMOL/L (ref 135–146)
TRIGL SERPL-MCNC: 262 MG/DL
WBC # BLD AUTO: 8.4 THOUSAND/UL (ref 3.8–10.8)

## 2022-08-29 ENCOUNTER — TELEPHONE (OUTPATIENT)
Dept: FAMILY MEDICINE | Facility: CLINIC | Age: 72
End: 2022-08-29

## 2022-08-29 NOTE — TELEPHONE ENCOUNTER
----- Message from DAMIR Hopper sent at 8/26/2022  3:26 PM CDT -----  Please contact patient and inform him that his lab work was reviewed.  Kidney function is within normal limits, and liver function is at baseline.  No signs of anemia noted. Of note, Glucose was elevated at 133 and Triglycerides are also slightly elevated at 262.  Begin following a strict diabetic diet and increase daily exercise.

## 2022-09-02 ENCOUNTER — TELEPHONE (OUTPATIENT)
Dept: FAMILY MEDICINE | Facility: CLINIC | Age: 72
End: 2022-09-02

## 2022-09-02 LAB
APPEARANCE UR: CLEAR
BACTERIA #/AREA URNS HPF: ABNORMAL /HPF
BACTERIA UR CULT: ABNORMAL
BILIRUB UR QL STRIP: NEGATIVE
COLOR UR: YELLOW
GLUCOSE UR QL STRIP: NEGATIVE
HGB UR QL STRIP: NEGATIVE
HYALINE CASTS #/AREA URNS LPF: ABNORMAL /LPF
KETONES UR QL STRIP: ABNORMAL
LEUKOCYTE ESTERASE UR QL STRIP: NEGATIVE
NITRITE UR QL STRIP: NEGATIVE
PH UR STRIP: ABNORMAL [PH] (ref 5–8)
PROT UR QL STRIP: NEGATIVE
RBC #/AREA URNS HPF: ABNORMAL /HPF
SERVICE CMNT-IMP: ABNORMAL
SP GR UR STRIP: 1.02 (ref 1–1.03)
SQUAMOUS #/AREA URNS HPF: ABNORMAL /HPF
WBC #/AREA URNS HPF: ABNORMAL /HPF

## 2022-09-02 NOTE — TELEPHONE ENCOUNTER
Spoke with pt in regards to recent urinalysis results. Verbalized per Matt that his urinalysis has resulted. UA is negative for any signs of infection, spilled protein, glucose, or blood. Pt acknowledge understanding.

## 2022-09-02 NOTE — TELEPHONE ENCOUNTER
----- Message from DAMIR Hopper sent at 9/2/2022  7:45 AM CDT -----  Please contact patient and inform him that his urinalysis has resulted. UA is negative for any signs of infection, spilled protein, glucose, or blood.

## 2022-09-15 ENCOUNTER — TELEPHONE (OUTPATIENT)
Dept: FAMILY MEDICINE | Facility: CLINIC | Age: 72
End: 2022-09-15

## 2022-09-15 NOTE — TELEPHONE ENCOUNTER
----- Message from Machelle Townsend MA sent at 9/15/2022 11:04 AM CDT -----  Regarding: er f/u  Patient needs 1 week er f/u from [facility]. They will be faxing info  [phone]

## 2022-09-20 ENCOUNTER — OFFICE VISIT (OUTPATIENT)
Dept: FAMILY MEDICINE | Facility: CLINIC | Age: 72
End: 2022-09-20
Payer: MEDICARE

## 2022-09-20 VITALS
HEART RATE: 70 BPM | HEIGHT: 69 IN | DIASTOLIC BLOOD PRESSURE: 74 MMHG | BODY MASS INDEX: 40.14 KG/M2 | WEIGHT: 271 LBS | TEMPERATURE: 98 F | SYSTOLIC BLOOD PRESSURE: 138 MMHG

## 2022-09-20 DIAGNOSIS — T14.8XXA ANIMAL BITE: ICD-10-CM

## 2022-09-20 DIAGNOSIS — Z09 HOSPITAL DISCHARGE FOLLOW-UP: Primary | ICD-10-CM

## 2022-09-20 DIAGNOSIS — I10 ESSENTIAL HYPERTENSION: ICD-10-CM

## 2022-09-20 DIAGNOSIS — E11.65 TYPE 2 DIABETES MELLITUS WITH HYPERGLYCEMIA, WITHOUT LONG-TERM CURRENT USE OF INSULIN: ICD-10-CM

## 2022-09-20 PROCEDURE — 99213 PR OFFICE/OUTPT VISIT, EST, LEVL III, 20-29 MIN: ICD-10-PCS | Mod: S$GLB,,, | Performed by: PHYSICIAN ASSISTANT

## 2022-09-20 PROCEDURE — 99213 OFFICE O/P EST LOW 20 MIN: CPT | Mod: S$GLB,,, | Performed by: PHYSICIAN ASSISTANT

## 2022-09-20 NOTE — PROGRESS NOTES
SUBJECTIVE:    Patient ID: Ciaran Canchola is a 71 y.o. male.    Chief Complaint: Follow-up (Dog bite)    Pt is a 71-year-old male who presents today for an ER follow-up.  He presented to [facility] ER on 09/14/2022 secondary to a dog bite to the right forearm.  Wound was irrigated.  No sutures or adhesive was applied. He was prescribed Augmentin 875-125 mg b.i.d. x 10 days and has 4 days remaining of this treatment regimen.  Also, he has been applying Bactroban 2% ointment to the bite wound b.i.d..  Tdap vaccine was administered in the ER.  He continues to clean the wound with Hibiclens solution and H2O2 2-3x/day.  He denies any fever, chills, rashing in the region, lymphadenopathy decreased  strength, or numbness in right extremity.    Office Visit on 08/24/2022   Component Date Value Ref Range Status    Hemoglobin A1C 08/24/2022 7.0  % Final    Glucose 08/24/2022 133 (H)  65 - 99 mg/dL Final    BUN 08/24/2022 25  7 - 25 mg/dL Final    Creatinine 08/24/2022 1.26  0.70 - 1.28 mg/dL Final    EGFR 08/24/2022 61  > OR = 60 mL/min/1.73m2 Final    BUN/Creatinine Ratio 08/24/2022 NOT APPLICABLE  6 - 22 (calc) Final    Sodium 08/24/2022 136  135 - 146 mmol/L Final    Potassium 08/24/2022 4.3  3.5 - 5.3 mmol/L Final    Chloride 08/24/2022 100  98 - 110 mmol/L Final    CO2 08/24/2022 27  20 - 32 mmol/L Final    Calcium 08/24/2022 10.0  8.6 - 10.3 mg/dL Final    Total Protein 08/24/2022 7.3  6.1 - 8.1 g/dL Final    Albumin 08/24/2022 4.3  3.6 - 5.1 g/dL Final    Globulin, Total 08/24/2022 3.0  1.9 - 3.7 g/dL (calc) Final    Albumin/Globulin Ratio 08/24/2022 1.4  1.0 - 2.5 (calc) Final    Total Bilirubin 08/24/2022 0.6  0.2 - 1.2 mg/dL Final    Alkaline Phosphatase 08/24/2022 37  35 - 144 U/L Final    AST 08/24/2022 35  10 - 35 U/L Final    ALT 08/24/2022 48 (H)  9 - 46 U/L Final    Cholesterol 08/24/2022 135  <200 mg/dL Final    HDL 08/24/2022 35 (L)  > OR = 40 mg/dL Final    Triglycerides 08/24/2022 262 (H)  <150  mg/dL Final    LDL Cholesterol 08/24/2022 66  mg/dL (calc) Final    HDL/Cholesterol Ratio 08/24/2022 3.9  <5.0 (calc) Final    Non HDL Chol. (LDL+VLDL) 08/24/2022 100  <130 mg/dL (calc) Final    Color, UA 09/01/2022 YELLOW  YELLOW Final    Appearance, UA 09/01/2022 CLEAR  CLEAR Final    Specific Gravity, UA 09/01/2022 1.025  1.001 - 1.035 Final    pH, UA 09/01/2022 < OR = 5.0  5.0 - 8.0 Final    Glucose, UA 09/01/2022 NEGATIVE  NEGATIVE Final    Bilirubin, UA 09/01/2022 NEGATIVE  NEGATIVE Final    Ketones, UA 09/01/2022 TRACE (A)  NEGATIVE Final    Occult Blood UA 09/01/2022 NEGATIVE  NEGATIVE Final    Protein, UA 09/01/2022 NEGATIVE  NEGATIVE Final    Nitrite, UA 09/01/2022 NEGATIVE  NEGATIVE Final    Leukocytes, UA 09/01/2022 NEGATIVE  NEGATIVE Final    WBC Casts, UA 09/01/2022 NONE SEEN  < OR = 5 /HPF Final    RBC Casts, UA 09/01/2022 NONE SEEN  < OR = 2 /HPF Final    Squam Epithel, UA 09/01/2022 NONE SEEN  < OR = 5 /HPF Final    Bacteria, UA 09/01/2022 NONE SEEN  NONE SEEN /HPF Final    Hyaline Casts, UA 09/01/2022 NONE SEEN  NONE SEEN /LPF Final    Service Cmt: 09/01/2022    Final    Reflexive Urine Culture 09/01/2022    Final    WBC 08/24/2022 8.4  3.8 - 10.8 Thousand/uL Final    RBC 08/24/2022 5.08  4.20 - 5.80 Million/uL Final    Hemoglobin 08/24/2022 15.4  13.2 - 17.1 g/dL Final    Hematocrit 08/24/2022 46.9  38.5 - 50.0 % Final    MCV 08/24/2022 92.3  80.0 - 100.0 fL Final    MCH 08/24/2022 30.3  27.0 - 33.0 pg Final    MCHC 08/24/2022 32.8  32.0 - 36.0 g/dL Final    RDW 08/24/2022 14.4  11.0 - 15.0 % Final    Platelets 08/24/2022 210  140 - 400 Thousand/uL Final    MPV 08/24/2022 11.0  7.5 - 12.5 fL Final    Neutrophils, Abs 08/24/2022 5,485  1,500 - 7,800 cells/uL Final    Lymph # 08/24/2022 1,814  850 - 3,900 cells/uL Final    Mono # 08/24/2022 756  200 - 950 cells/uL Final    Eos # 08/24/2022 294  15 - 500 cells/uL Final    Baso # 08/24/2022 50  0 - 200 cells/uL Final    Neutrophils Relative 08/24/2022  65.3  % Final    Lymph % 08/24/2022 21.6  % Final    Mono % 08/24/2022 9.0  % Final    Eosinophil % 08/24/2022 3.5  % Final    Basophil % 08/24/2022 0.6  % Final   Telephone on 05/09/2022   Component Date Value Ref Range Status    TESTOSTERONE, TOTAL, MALE 05/09/2022 398  250 - 827 ng/dL Final    Hemoglobin A1C 05/09/2022 7.2 (H)  <5.7 % of total Hgb Final       Past Medical History:   Diagnosis Date    Depression     Hyperlipidemia      Past Surgical History:   Procedure Laterality Date    CHOLECYSTECTOMY      COLONOSCOPY  2018    RTC in 5 years. done in [place]    TONSILLECTOMY      UMBILICAL HERNIA REPAIR       Family History   Problem Relation Age of Onset    ALS Mother     Heart disease Father     Heart disease Sister        Marital Status:   Alcohol History:  reports current alcohol use.  Tobacco History:  reports that he quit smoking about 37 years ago. His smoking use included cigarettes. He has never used smokeless tobacco.  Drug History:  reports no history of drug use.    Health Maintenance Topics with due status: Not Due       Topic Last Completion Date    Colorectal Cancer Screening 10/06/2021    Diabetes Urine Screening 02/11/2022    Foot Exam 02/18/2022    Eye Exam 04/13/2022    Lipid Panel 08/24/2022    Hemoglobin A1c 08/24/2022    TETANUS VACCINE 09/14/2022    Low Dose Statin 09/20/2022     Immunization History   Administered Date(s) Administered    COVID-19, MRNA, LN-S, PF (MODERNA FULL 0.5 ML DOSE) 02/03/2021, 03/03/2021, 10/25/2021    Hepatitis A, Adult 09/16/2005    Influenza 11/18/2020    Influenza (FLUAD) - Trivalent - Adjuvanted - PF (65+) 12/06/2019    Pneumococcal Conjugate - 13 Valent 08/13/2020    Pneumococcal Polysaccharide - 23 Valent 02/18/2022    Td (ADULT) 09/16/2005    Tdap 09/14/2022       Review of patient's allergies indicates:  No Known Allergies    Current Outpatient Medications:     clobetasoL (TEMOVATE) 0.05 % cream, Apply topically 2 (two) times daily., Disp: 45 g,  Rfl: 4    esomeprazole (NEXIUM) 40 MG capsule, Take 1 capsule (40 mg total) by mouth before breakfast., Disp: 90 capsule, Rfl: 1    glipiZIDE (GLUCOTROL) 10 MG tablet, Take 1 tablet (10 mg total) by mouth daily with breakfast., Disp: 90 tablet, Rfl: 1    lisinopriL-hydrochlorothiazide (PRINZIDE,ZESTORETIC) 20-25 mg Tab, Take 1 tablet by mouth once daily., Disp: 90 tablet, Rfl: 1    meloxicam (MOBIC) 15 MG tablet, Take 1 tablet (15 mg total) by mouth once daily., Disp: 90 tablet, Rfl: 1    metFORMIN (GLUCOPHAGE-XR) 500 MG ER 24hr tablet, Take 1 tablet (500 mg total) by mouth daily with breakfast., Disp: 90 tablet, Rfl: 1    naproxen (NAPROSYN) 500 MG tablet, Take 1 tablet (500 mg total) by mouth 2 (two) times daily as needed., Disp: 60 tablet, Rfl: 4    rosuvastatin (CRESTOR) 5 MG tablet, Take 1 tablet (5 mg total) by mouth once daily., Disp: 90 tablet, Rfl: 1    sertraline (ZOLOFT) 50 MG tablet, Take 2 tablets (100 mg total) by mouth every evening., Disp: 180 tablet, Rfl: 1    testosterone cypionate (DEPOTESTOTERONE CYPIONATE) 200 mg/mL injection, Inject 1 mL (200 mg total) into the muscle every 14 (fourteen) days., Disp: 2 mL, Rfl: 1    Review of Systems   Constitutional:  Negative for activity change, chills, fatigue and fever.   Respiratory:  Negative for cough, shortness of breath and wheezing.    Cardiovascular:  Negative for chest pain and palpitations.   Gastrointestinal:  Negative for abdominal pain, constipation, diarrhea, nausea and vomiting.   Genitourinary:  Negative for dysuria.   Musculoskeletal:  Negative for arthralgias, joint swelling and myalgias.   Skin:  Positive for wound. Negative for rash.   Neurological:  Negative for dizziness, seizures, syncope, weakness, light-headedness, numbness and headaches.   Hematological:  Bruises/bleeds easily.   Psychiatric/Behavioral:  Negative for behavioral problems, confusion and decreased concentration.         Objective:      Vitals:    09/20/22 1538   BP:  "138/74   Pulse: 70   Temp: 98.4 °F (36.9 °C)   Weight: 122.9 kg (271 lb)   Height: 5' 9" (1.753 m)     Physical Exam  Vitals and nursing note reviewed.   Constitutional:       General: He is not in acute distress.     Appearance: Normal appearance. He is obese. He is not ill-appearing, toxic-appearing or diaphoretic.   HENT:      Head: Normocephalic and atraumatic.      Right Ear: External ear normal.      Left Ear: External ear normal.      Nose: No rhinorrhea.      Mouth/Throat:      Mouth: Mucous membranes are moist.      Pharynx: Oropharynx is clear.   Eyes:      General: No scleral icterus.        Right eye: No discharge.         Left eye: No discharge.      Extraocular Movements: Extraocular movements intact.      Conjunctiva/sclera: Conjunctivae normal.   Cardiovascular:      Rate and Rhythm: Normal rate and regular rhythm.      Pulses: Normal pulses.      Heart sounds: Normal heart sounds. No murmur heard.    No friction rub.   Pulmonary:      Effort: Pulmonary effort is normal.      Breath sounds: Normal breath sounds. No wheezing, rhonchi or rales.   Abdominal:      Palpations: Abdomen is soft.      Tenderness: There is no abdominal tenderness. There is no guarding or rebound.   Musculoskeletal:      Cervical back: No tenderness.      Right lower leg: No edema.      Left lower leg: No edema.      Comments: 5/5  strength noted bilaterally.  5/5 strength against resistance on flexion and extension of the right forearm.   Lymphadenopathy:      Cervical: No cervical adenopathy.   Skin:     General: Skin is warm and dry.      Findings: Bruising present. No rash.             Comments: No right axillary lymphadenopathy noted upon palpation.   Neurological:      General: No focal deficit present.      Mental Status: He is alert. Mental status is at baseline.      Cranial Nerves: No cranial nerve deficit.      Motor: No weakness.      Coordination: Coordination normal.      Gait: Gait normal.   Psychiatric:    "      Mood and Affect: Mood normal.         Behavior: Behavior normal. Behavior is cooperative.         Thought Content: Thought content normal.         Judgment: Judgment normal.           Assessment:       1. Hospital discharge follow-up    2. Animal bite    3. Essential hypertension    4. Type 2 diabetes mellitus with hyperglycemia, without long-term current use of insulin           Plan:       Hospital discharge follow-up  ER discharge summary reviewed.    Animal bite  Continue Augmentin 875-125mg regimen prescribed by ER.  Continue applying Bactroban 2% ointment b.i.d. x5 days.  Discontinue hydrogen peroxide use for wounds cleansing. Clean with soap and water alone.  Keep wound covered with 4 x 4 gauze.  Tdap was administered on 09/14/2022 and is UTD.    Essential hypertension  Stable and well controlled.  Continue as is.    Type 2 diabetes mellitus with hyperglycemia, without long-term current use of insulin    Follow up if symptoms worsen or fail to improve, for dog bite.        9/20/2022 Matt Braga PA-C

## 2022-09-22 ENCOUNTER — DOCUMENTATION ONLY (OUTPATIENT)
Dept: PHARMACY | Facility: CLINIC | Age: 72
End: 2022-09-22
Payer: MEDICARE

## 2022-09-22 NOTE — PROGRESS NOTES
Administered testosterone 200 mg into the right upper gluteal muscle on 9/22/22      Lot [id number]  Exp 05/2025

## 2022-09-27 DIAGNOSIS — E29.1 MALE HYPOGONADISM: ICD-10-CM

## 2022-09-27 RX ORDER — TESTOSTERONE CYPIONATE 200 MG/ML
200 INJECTION, SOLUTION INTRAMUSCULAR
Qty: 2 ML | Refills: 1 | Status: SHIPPED | OUTPATIENT
Start: 2022-09-27 | End: 2022-11-26 | Stop reason: SDUPTHER

## 2022-10-05 ENCOUNTER — DOCUMENTATION ONLY (OUTPATIENT)
Dept: PHARMACY | Facility: CLINIC | Age: 72
End: 2022-10-05
Payer: MEDICARE

## 2022-10-20 ENCOUNTER — DOCUMENTATION ONLY (OUTPATIENT)
Dept: PHARMACY | Facility: CLINIC | Age: 72
End: 2022-10-20
Payer: MEDICARE

## 2022-11-03 ENCOUNTER — DOCUMENTATION ONLY (OUTPATIENT)
Dept: PHARMACY | Facility: CLINIC | Age: 72
End: 2022-11-03
Payer: MEDICARE

## 2022-11-05 DIAGNOSIS — I10 ESSENTIAL HYPERTENSION: ICD-10-CM

## 2022-11-07 RX ORDER — LISINOPRIL AND HYDROCHLOROTHIAZIDE 20; 25 MG/1; MG/1
TABLET ORAL
Qty: 90 TABLET | Refills: 1 | Status: SHIPPED | OUTPATIENT
Start: 2022-11-07 | End: 2023-02-15 | Stop reason: SDUPTHER

## 2022-11-26 DIAGNOSIS — E29.1 MALE HYPOGONADISM: ICD-10-CM

## 2022-11-28 RX ORDER — TESTOSTERONE CYPIONATE 200 MG/ML
200 INJECTION, SOLUTION INTRAMUSCULAR
Qty: 2 ML | Refills: 1 | Status: SHIPPED | OUTPATIENT
Start: 2022-11-28 | End: 2023-01-11 | Stop reason: SDUPTHER

## 2023-01-11 DIAGNOSIS — E29.1 MALE HYPOGONADISM: ICD-10-CM

## 2023-01-11 RX ORDER — TESTOSTERONE CYPIONATE 200 MG/ML
200 INJECTION, SOLUTION INTRAMUSCULAR
Qty: 2 ML | Refills: 1 | Status: SHIPPED | OUTPATIENT
Start: 2023-01-11 | End: 2023-11-20

## 2023-01-11 NOTE — TELEPHONE ENCOUNTER
Pt is needing his Testosterone cypionate refilled. Last office visit 09/20/2022. Next office visit 02/20/2023. Last labs done for testosterone was 05/09/2022.

## 2023-02-15 ENCOUNTER — OFFICE VISIT (OUTPATIENT)
Dept: FAMILY MEDICINE | Facility: CLINIC | Age: 73
End: 2023-02-15
Payer: MEDICARE

## 2023-02-15 ENCOUNTER — LAB VISIT (OUTPATIENT)
Dept: LAB | Facility: HOSPITAL | Age: 73
End: 2023-02-15
Attending: FAMILY MEDICINE
Payer: MEDICARE

## 2023-02-15 ENCOUNTER — TELEPHONE (OUTPATIENT)
Dept: FAMILY MEDICINE | Facility: CLINIC | Age: 73
End: 2023-02-15

## 2023-02-15 VITALS
OXYGEN SATURATION: 98 % | BODY MASS INDEX: 39.69 KG/M2 | WEIGHT: 268 LBS | HEIGHT: 69 IN | SYSTOLIC BLOOD PRESSURE: 140 MMHG | HEART RATE: 58 BPM | DIASTOLIC BLOOD PRESSURE: 88 MMHG | TEMPERATURE: 98 F | RESPIRATION RATE: 18 BRPM

## 2023-02-15 DIAGNOSIS — E11.65 TYPE 2 DIABETES MELLITUS WITH HYPERGLYCEMIA, WITHOUT LONG-TERM CURRENT USE OF INSULIN: ICD-10-CM

## 2023-02-15 DIAGNOSIS — E78.2 MIXED HYPERLIPIDEMIA: ICD-10-CM

## 2023-02-15 DIAGNOSIS — R79.89 LOW TESTOSTERONE LEVEL IN MALE: ICD-10-CM

## 2023-02-15 DIAGNOSIS — E66.01 CLASS 3 SEVERE OBESITY DUE TO EXCESS CALORIES WITHOUT SERIOUS COMORBIDITY WITH BODY MASS INDEX (BMI) OF 40.0 TO 44.9 IN ADULT: Primary | ICD-10-CM

## 2023-02-15 DIAGNOSIS — Z00.00 PREVENTATIVE HEALTH CARE: ICD-10-CM

## 2023-02-15 DIAGNOSIS — F33.42 RECURRENT MAJOR DEPRESSIVE DISORDER, IN FULL REMISSION: ICD-10-CM

## 2023-02-15 DIAGNOSIS — Z13.6 SCREENING FOR AAA (ABDOMINAL AORTIC ANEURYSM): ICD-10-CM

## 2023-02-15 DIAGNOSIS — I10 ESSENTIAL HYPERTENSION: ICD-10-CM

## 2023-02-15 DIAGNOSIS — M19.90 ARTHRITIS: ICD-10-CM

## 2023-02-15 LAB
ALBUMIN SERPL BCP-MCNC: 4.2 G/DL (ref 3.5–5.2)
ALP SERPL-CCNC: 35 U/L (ref 55–135)
ALT SERPL W/O P-5'-P-CCNC: 36 U/L (ref 10–44)
ANION GAP SERPL CALC-SCNC: 7 MMOL/L (ref 8–16)
AST SERPL-CCNC: 30 U/L (ref 10–40)
BILIRUB SERPL-MCNC: 0.6 MG/DL (ref 0.1–1)
BUN SERPL-MCNC: 26 MG/DL (ref 8–23)
CALCIUM SERPL-MCNC: 9.4 MG/DL (ref 8.7–10.5)
CHLORIDE SERPL-SCNC: 100 MMOL/L (ref 95–110)
CHOLEST SERPL-MCNC: 139 MG/DL (ref 120–199)
CHOLEST/HDLC SERPL: 4.2 {RATIO} (ref 2–5)
CO2 SERPL-SCNC: 27 MMOL/L (ref 23–29)
CREAT SERPL-MCNC: 1.4 MG/DL (ref 0.5–1.4)
EST. GFR  (NO RACE VARIABLE): 53.4 ML/MIN/1.73 M^2
ESTIMATED AVG GLUCOSE: 169 MG/DL (ref 68–131)
GLUCOSE SERPL-MCNC: 147 MG/DL (ref 70–110)
HBA1C MFR BLD: 7.5 % (ref 4.5–6.2)
HDLC SERPL-MCNC: 33 MG/DL (ref 40–75)
HDLC SERPL: 23.7 % (ref 20–50)
LDLC SERPL CALC-MCNC: 73 MG/DL (ref 63–159)
NONHDLC SERPL-MCNC: 106 MG/DL
POTASSIUM SERPL-SCNC: 4.1 MMOL/L (ref 3.5–5.1)
PROT SERPL-MCNC: 7.7 G/DL (ref 6–8.4)
SODIUM SERPL-SCNC: 134 MMOL/L (ref 136–145)
TRIGL SERPL-MCNC: 165 MG/DL (ref 30–150)

## 2023-02-15 PROCEDURE — 80061 LIPID PANEL: CPT | Performed by: FAMILY MEDICINE

## 2023-02-15 PROCEDURE — 99214 PR OFFICE/OUTPT VISIT, EST, LEVL IV, 30-39 MIN: ICD-10-PCS | Mod: S$PBB,AQ,, | Performed by: FAMILY MEDICINE

## 2023-02-15 PROCEDURE — 84403 ASSAY OF TOTAL TESTOSTERONE: CPT | Performed by: FAMILY MEDICINE

## 2023-02-15 PROCEDURE — 83036 HEMOGLOBIN GLYCOSYLATED A1C: CPT | Performed by: FAMILY MEDICINE

## 2023-02-15 PROCEDURE — 99214 OFFICE O/P EST MOD 30 MIN: CPT | Performed by: FAMILY MEDICINE

## 2023-02-15 PROCEDURE — 86803 HEPATITIS C AB TEST: CPT | Performed by: FAMILY MEDICINE

## 2023-02-15 PROCEDURE — 36415 COLL VENOUS BLD VENIPUNCTURE: CPT | Performed by: FAMILY MEDICINE

## 2023-02-15 PROCEDURE — 80053 COMPREHEN METABOLIC PANEL: CPT | Performed by: FAMILY MEDICINE

## 2023-02-15 PROCEDURE — 99214 OFFICE O/P EST MOD 30 MIN: CPT | Mod: S$PBB,AQ,, | Performed by: FAMILY MEDICINE

## 2023-02-15 RX ORDER — MELOXICAM 15 MG/1
15 TABLET ORAL DAILY
Qty: 90 TABLET | Refills: 1 | Status: SHIPPED | OUTPATIENT
Start: 2023-02-15 | End: 2023-09-09 | Stop reason: SDUPTHER

## 2023-02-15 RX ORDER — LISINOPRIL AND HYDROCHLOROTHIAZIDE 20; 25 MG/1; MG/1
1 TABLET ORAL DAILY
Qty: 90 TABLET | Refills: 1 | Status: SHIPPED | OUTPATIENT
Start: 2023-02-15 | End: 2023-09-26 | Stop reason: SDUPTHER

## 2023-02-15 RX ORDER — DOXYCYCLINE 100 MG/1
100 CAPSULE ORAL 2 TIMES DAILY
COMMUNITY
Start: 2022-10-14 | End: 2023-02-15

## 2023-02-15 RX ORDER — SERTRALINE HYDROCHLORIDE 50 MG/1
100 TABLET, FILM COATED ORAL NIGHTLY
Qty: 180 TABLET | Refills: 1 | Status: SHIPPED | OUTPATIENT
Start: 2023-02-15 | End: 2023-08-07

## 2023-02-15 RX ORDER — GLIPIZIDE 10 MG/1
10 TABLET ORAL
Qty: 90 TABLET | Refills: 1 | Status: SHIPPED | OUTPATIENT
Start: 2023-02-15 | End: 2023-08-07

## 2023-02-15 RX ORDER — INSULIN PUMP SYRINGE, 3 ML
EACH MISCELLANEOUS
Qty: 1 EACH | Refills: 0 | Status: SHIPPED | OUTPATIENT
Start: 2023-02-15 | End: 2024-02-15

## 2023-02-15 RX ORDER — LANCETS
EACH MISCELLANEOUS
Qty: 200 EACH | Refills: 3 | Status: SHIPPED | OUTPATIENT
Start: 2023-02-15

## 2023-02-15 NOTE — PATIENT INSTRUCTIONS
We understand that controlling diabetes can feel overwhelming at times. We are here to offer the tools and support to help you manage your diabetes and meet your health goals. Please reference the meal planning guide below.   [facility]  Diabetic Meal Planning    About this topic  Healthy eating is an important part of keeping your diabetes in control. A balanced diet along with your diabetic drugs will help you control your blood sugar. The right portions of healthy foods may help keep your sugar within your goal range. It may also help to lower or maintain your weight, manage cholesterol, the amount of fat in your blood, and blood pressure.      Image(s)    What will the results be?  Healthy eating may help you lower your blood sugar and keep it in a safe range. Keeping your blood sugar in a safe range may lower your chances for long term problems from your diabetes. You may be less likely to have damage to your kidneys, heart, eyes, or nerves.    What changes to diet are needed?  Healthy eating means:  Eating a range of foods from all food groups.  Eating the right size portions. Read the nutrition facts on each food you eat.  Eating meals and snacks at the same time each day. Do not skip a meal or snack. Skipping meals may cause your blood sugar to go too low, especially if you are on drugs for your diabetes. Skipping meals can also cause you to eat too much at the next meal.  Talk to your diabetes educator about making a personal meal plan for you. They can also help you eat the foods you like by modifying them to be healthier.    Who should use this diet?  This diet is helpful to people with high blood sugar or diabetes.    What foods are good to eat?  It is important to make a healthy meal. Eat a variety of different foods in the right portion.  Fill half of your plate with non-starchy vegetables. Non-starchy vegetables include: Broccoli, green beans, carrots, greens (huang, kale, mustard, turnip),  onions, tomatoes, asparagus, beets, cauliflower, celery, and cucumber. Non-starchy vegetables are high in fiber and low in carbohydrates. These will help keep you full for longer without raising your blood sugar.  Fill 1/4 of your plate with carbohydrates. Try to choose whole grain options. Whole-grain products have more fiber which will make you feel full. Carbohydrates include: Bread, rice, pasta, and starchy vegetables. Starchy vegetables include beans, potatoes, acorn squash, butternut squash, corn, and peas.  Fill 1/4 of your plate with protein. Choose low-fat cuts of meat like boneless, skinless chicken breast; pork loin; 90% lean beef; lean and skinless turkey meat; and fresh fish (not fried) such as tuna, salmon, or mackerel.  Add a low-fat or non-fat dairy product like 8 ounces (240 mL) of 1% or skim milk or 6 ounces (180 mL) of low-fat yogurt. Eat the recommended serving. Milk and yogurt have carbohydrates which raise your blood sugar.  Add a small piece of fruit or 1/2 cup (120 grams) of frozen or canned fruit. Choose canned fruits in juice or water. Fruits include apples, bananas, peaches, pears, pineapple, plums, and oranges. Eat the recommended serving. Fruit has carbohydrates which can raise your blood sugar.  Include healthy fats in your meal like olive oil, canola oil, avocado, and nuts.  Other good foods to include in your diet are:  Foods high in fiber. Adding fiber to your meals may help control your blood sugar and cholesterol levels. It may also help with weight loss and prevent belly problems. If you are younger than 50, it is recommended to get 25 to 38 grams per day. If you are older than 50, it is recommended to get 21 to 30 grams per day. Good sources of fiber include:  Nuts and seeds  Beans, peas, and other legumes  Whole-grain products  Fruits  Vegetables  Smart snacks in the right portion. Do not go too long in between meals. Ask your dietitian when you should have a snack in between  your meals. Snack on things like:  Nuts  Vegetables and low-fat dressing  Light popcorn  Low-fat cheese and crackers  1/2 sandwich    What foods should be limited or avoided?  You may need to limit the amount of some foods you eat and how often you eat them. Foods that should be limited include:  High fat or processed foods like:  Ziegler  Sausage  Hot dogs  Whole-fat dairy products  Potato chips  Foods high in sodium like:  Canned soups  Soy sauce and some salad dressings  Cured meats  Salted snack foods like pretzels  Olives  Fats and oils like:  Margarine  Salad dressing  Gravy  Lard or shortening  Foods high in sugar like:  Candy  Cookies  Cake  Ice cream  Table sugar  Soda  Juice drinks  Starches that are not whole grain like:  White rice  French fries  White pasta  White bread  Sugary cereals  Baked goods, pastries, croissants  Beer, wine, and mixed drinks (alcohol). Drink alcohol in moderation (1 drink per day or less for adult women and 2 drinks per day or less for adult men). Drinking alcohol can cause fluctuations in your blood sugar and interfere with how your diabetic drugs work. Talk to your doctor about how you can safely include alcohol into your diet.    What can be done to prevent this health problem?  Some people have a higher chance of developing diabetes than others. This is a life-long problem. You can still lead a normal life. Diabetes can be managed through diet, exercise, and drugs. It is important to have support from your family and friends to help you with your goals.    When do I need to call the doctor?  Blood sugar level is above 240 mg/dL for more than a day  Blood sugar level drops to less than 40 and does not respond to 15 grams of simple carbohydrate, like 4 glucose tablets or 1/2 cup (120 mL) of fruit juice  Trouble breathing  Very sleepy and trouble concentrating  Feeling very thirsty  Needing to urinate (pee) more than normal  Throw up more than once or have many loose stools  You  are so sick you cannot eat or drink  Fever over 101°F (38°C)  Questions about your diet plan  You are not feeling better in 2 to 3 days or you are feeling worse    Helpful tips  Plan ahead. Plan your meals and grocery list before going to the store. This will help you to choose foods that are good for you.  Pack a lunch. Take healthy meals and snacks with you to work.  Keep a food journal to help keep you on track. Take note of foods that keep your blood sugar in goal range. Also note foods and meals that raise or lower your blood sugar. There are apps for your phone that can help with this.  Visit a restaurant's website before eating out. You can see the menu options and nutritional facts. This way, you can see which items best fit into your diet plan. Call ahead if you have questions.    Disclaimer.This generalized information is a limited summary of diagnosis, treatment, and/or medication information. It is not meant to be comprehensive and should be used as a tool to help the user understand and/or assess potential diagnostic and treatment options. It does NOT include all information about conditions, treatments, medications, side effects, or risks that may apply to a specific patient. It is not intended to be medical advice or a substitute for the medical advice, diagnosis, or treatment of a health care provider based on the health care provider's examination and assessment of a patient's specific and unique circumstances. Patients must speak with a health care provider for complete information about their health, medical questions, and treatment options, including any risks or benefits regarding use of medications. This information does not endorse any treatments or medications as safe, effective, or approved for treating a specific patient. [employer] and its affiliates disclaim any warranty or liability relating to this information or the use thereof. The use of this information is governed by the Terms  of Use, available at Terms of Use. ©2022 [employer] and its affiliates and/or licensors. All rights reserved.

## 2023-02-15 NOTE — PROGRESS NOTES
Subjective:       Patient ID: Ciaran Canchola is a 72 y.o. male.    Chief Complaint: Diabetes      Patient is here to get established switching from Matt camacho at Dr. Sewell's office.  Patient Active Problem List:     Plantar fasciitis - Left Foot     Foot pain, left     Class 3 severe obesity with body mass index (BMI) of 40.0 to 44.9 in adult     Essential hypertension     Mixed hyperlipidemia     Major depressive disorder in full remission     Low testosterone level in male     Intrinsic eczema is has settled down but     Male hypogonadism     Type 2 diabetes mellitus with hyperglycemia, without long-term current use of insulin.  Lab Results       Component                Value               Date                       WBC                      8.4                 08/24/2022                 HGB                      15.4                08/24/2022                 HCT                      46.9                08/24/2022                 PLT                      210                 08/24/2022                 CHOL                     135                 08/24/2022                 TRIG                     262 (H)             08/24/2022                 HDL                      35 (L)              08/24/2022                 ALT                      48 (H)              08/24/2022                 AST                      35                  08/24/2022                 NA                       136                 08/24/2022                 K                        4.3                 08/24/2022                 CL                       100                 08/24/2022                 CREATININE               1.26                08/24/2022                 BUN                      25                  08/24/2022                 CO2                      27                  08/24/2022                 TSH                      1.66                01/27/2021                 HGBA1C                   7.2 (H)             05/09/2022                  MICROALBUR               8.6                 02/11/2022                    Diabetes  Pertinent negatives for hypoglycemia include no confusion or headaches. Pertinent negatives for diabetes include no chest pain, no polydipsia, no polyuria and no weakness.     Allergies and Medications:   Review of patient's allergies indicates:  No Known Allergies  Current Outpatient Medications   Medication Sig Dispense Refill    clobetasoL (TEMOVATE) 0.05 % cream Apply topically 2 (two) times daily. 45 g 4    esomeprazole (NEXIUM) 40 MG capsule Take 1 capsule (40 mg total) by mouth before breakfast. 90 capsule 1    lisinopriL-hydrochlorothiazide (PRINZIDE,ZESTORETIC) 20-25 mg Tab TAKE 1 TABLET BY MOUTH EVERY DAY 90 tablet 1    meloxicam (MOBIC) 15 MG tablet Take 1 tablet (15 mg total) by mouth once daily. 90 tablet 1    rosuvastatin (CRESTOR) 5 MG tablet Take 1 tablet (5 mg total) by mouth once daily. 90 tablet 1    testosterone cypionate (DEPOTESTOTERONE CYPIONATE) 200 mg/mL injection Inject 1 mL (200 mg total) into the muscle every 14 (fourteen) days. 2 mL 1    blood sugar diagnostic Strp To check BG 2 times daily, to use with insurance preferred meter 200 strip 3    blood-glucose meter kit To check BG 2 times daily, to use with insurance preferred meter 1 each 0    glipiZIDE (GLUCOTROL) 10 MG tablet Take 1 tablet (10 mg total) by mouth daily with breakfast. 90 tablet 1    lancets Misc To check BG 2 times daily, to use with insurance preferred meter 200 each 3    sertraline (ZOLOFT) 50 MG tablet Take 2 tablets (100 mg total) by mouth every evening. 180 tablet 1     No current facility-administered medications for this visit.       Family History:   Family History   Problem Relation Age of Onset    ALS Mother     Heart disease Father     Heart disease Sister        Social History:   Social History     Socioeconomic History    Marital status:    Tobacco Use    Smoking status: Former     Types: Cigarettes     Quit date:  1985     Years since quittin.1    Smokeless tobacco: Never    Tobacco comments:     Quit  35 years ago   Substance and Sexual Activity    Alcohol use: Yes    Drug use: Never    Sexual activity: Yes     Partners: Female       Review of Systems   Constitutional:  Positive for activity change. Negative for unexpected weight change.   HENT:  Positive for hearing loss. Negative for rhinorrhea and trouble swallowing.    Eyes:  Negative for discharge and visual disturbance.   Respiratory:  Negative for chest tightness and wheezing.    Cardiovascular:  Negative for chest pain and palpitations.   Gastrointestinal:  Negative for blood in stool, constipation, diarrhea and vomiting.   Endocrine: Negative for polydipsia and polyuria.   Genitourinary:  Positive for urgency. Negative for difficulty urinating and hematuria.   Musculoskeletal:  Positive for arthralgias. Negative for joint swelling and neck pain.   Neurological:  Negative for weakness and headaches.   Psychiatric/Behavioral:  Positive for dysphoric mood. Negative for confusion.      Objective:     Vitals:    02/15/23 1014   BP: (!) 140/88   Pulse: (!) 58   Resp: 18   Temp: 98.4 °F (36.9 °C)        Physical Exam  Vitals and nursing note reviewed.   Constitutional:       General: He is not in acute distress.     Appearance: Normal appearance. He is well-developed. He is not ill-appearing, toxic-appearing or diaphoretic.   HENT:      Head: Normocephalic.   Eyes:      Conjunctiva/sclera: Conjunctivae normal.      Pupils: Pupils are equal, round, and reactive to light.   Cardiovascular:      Rate and Rhythm: Normal rate and regular rhythm.      Heart sounds: Normal heart sounds. No murmur heard.    No friction rub. No gallop.   Pulmonary:      Effort: Pulmonary effort is normal. No respiratory distress.      Breath sounds: Normal breath sounds. No stridor. No wheezing, rhonchi or rales.   Chest:      Chest wall: No tenderness.   Skin:     General: Skin is warm and  dry.   Neurological:      Mental Status: He is alert.   Psychiatric:         Behavior: Behavior normal.         Thought Content: Thought content normal.         Judgment: Judgment normal.       Assessment:       1. Class 3 severe obesity due to excess calories without serious comorbidity with body mass index (BMI) of 40.0 to 44.9 in adult    2. Recurrent major depressive disorder, in full remission    3. Type 2 diabetes mellitus with hyperglycemia, without long-term current use of insulin    4. Low testosterone level in male    5. Mixed hyperlipidemia    6. Preventative health care    7. Screening for AAA (abdominal aortic aneurysm)        Plan:       Ciaran was seen today for diabetes.    Diagnoses and all orders for this visit:    Class 3 severe obesity due to excess calories without serious comorbidity with body mass index (BMI) of 40.0 to 44.9 in adult    Recurrent major depressive disorder, in full remission  -     sertraline (ZOLOFT) 50 MG tablet; Take 2 tablets (100 mg total) by mouth every evening.    Type 2 diabetes mellitus with hyperglycemia, without long-term current use of insulin  Comments:  A1c: 7.2% today.   Stressed importance of taking his prescription medications as prescribed.  Continue as is, but take daily.  Orders:  -     blood-glucose meter kit; To check BG 2 times daily, to use with insurance preferred meter  -     lancets Misc; To check BG 2 times daily, to use with insurance preferred meter  -     blood sugar diagnostic Strp; To check BG 2 times daily, to use with insurance preferred meter  -     glipiZIDE (GLUCOTROL) 10 MG tablet; Take 1 tablet (10 mg total) by mouth daily with breakfast.  -     Comprehensive Metabolic Panel; Future  -     Hemoglobin A1C; Future  -     Microalbumin/Creatinine Ratio, Urine; Future    Low testosterone level in male  -     Testosterone; Future    Mixed hyperlipidemia  -     Comprehensive Metabolic Panel; Future  -     Lipid Panel; Future    Preventative  health care  -     Hepatitis C Antibody; Future    Screening for AAA (abdominal aortic aneurysm)  -     [place] AAA Screening; Future         Follow up in about 3 months (around 5/15/2023) for follow up DM, follow up HTN.

## 2023-02-15 NOTE — TELEPHONE ENCOUNTER
----- Message from Obie Argueta MD sent at 2/15/2023  1:00 PM CST -----  Results Ok, notify patient.

## 2023-02-16 ENCOUNTER — TELEPHONE (OUTPATIENT)
Dept: FAMILY MEDICINE | Facility: CLINIC | Age: 73
End: 2023-02-16

## 2023-02-16 LAB
HCV AB S/CO SERPL IA: NON REACTIVE
TESTOST SERPL-MCNC: 109 NG/DL (ref 264–916)

## 2023-02-16 NOTE — PROGRESS NOTES
Testosterone level is low at 109, other labs okay.  I would recommend we try to boost the testosterone by decreasing cortisol production from the adrenal glands this can be done using a supplement called DHEA that is try this 1st and recheck the testosterone in 6 months.

## 2023-02-16 NOTE — TELEPHONE ENCOUNTER
----- Message from Obie Argueta MD sent at 2/15/2023  4:47 PM CST -----  Results Ok, notify patient.

## 2023-02-16 NOTE — TELEPHONE ENCOUNTER
----- Message from Obie Argueta MD sent at 2/15/2023  4:26 PM CST -----  Results Ok, notify patient.

## 2023-03-02 ENCOUNTER — TELEPHONE (OUTPATIENT)
Dept: FAMILY MEDICINE | Facility: CLINIC | Age: 73
End: 2023-03-02

## 2023-03-02 ENCOUNTER — HOSPITAL ENCOUNTER (OUTPATIENT)
Dept: RADIOLOGY | Facility: HOSPITAL | Age: 73
Discharge: HOME | End: 2023-03-02
Attending: FAMILY MEDICINE
Payer: MEDICARE

## 2023-03-02 ENCOUNTER — PATIENT MESSAGE (OUTPATIENT)
Dept: FAMILY MEDICINE | Facility: CLINIC | Age: 73
End: 2023-03-02

## 2023-03-02 DIAGNOSIS — Z13.6 SCREENING FOR AAA (ABDOMINAL AORTIC ANEURYSM): ICD-10-CM

## 2023-03-02 PROCEDURE — 76706 US ABDL AORTA SCREEN AAA: CPT | Mod: TC,PO

## 2023-03-02 NOTE — TELEPHONE ENCOUNTER
----- Message from Obie Argueta MD sent at 3/2/2023 11:52 AM CST -----  Results Ok, notify patient.

## 2023-05-04 DIAGNOSIS — M19.90 ARTHRITIS: ICD-10-CM

## 2023-05-04 DIAGNOSIS — F33.42 RECURRENT MAJOR DEPRESSIVE DISORDER, IN FULL REMISSION: ICD-10-CM

## 2023-05-04 DIAGNOSIS — I10 ESSENTIAL HYPERTENSION: ICD-10-CM

## 2023-05-04 RX ORDER — LISINOPRIL AND HYDROCHLOROTHIAZIDE 20; 25 MG/1; MG/1
1 TABLET ORAL DAILY
Qty: 90 TABLET | Refills: 1 | Status: CANCELLED | OUTPATIENT
Start: 2023-05-04

## 2023-05-05 RX ORDER — SERTRALINE HYDROCHLORIDE 50 MG/1
100 TABLET, FILM COATED ORAL NIGHTLY
Qty: 180 TABLET | Refills: 1 | OUTPATIENT
Start: 2023-05-05 | End: 2023-11-01

## 2023-05-05 RX ORDER — MELOXICAM 15 MG/1
15 TABLET ORAL DAILY
Qty: 90 TABLET | Refills: 1 | OUTPATIENT
Start: 2023-05-05

## 2023-05-18 ENCOUNTER — TELEPHONE (OUTPATIENT)
Dept: FAMILY MEDICINE | Facility: CLINIC | Age: 73
End: 2023-05-18

## 2023-05-18 ENCOUNTER — LAB VISIT (OUTPATIENT)
Dept: LAB | Facility: HOSPITAL | Age: 73
End: 2023-05-18
Attending: FAMILY MEDICINE
Payer: MEDICARE

## 2023-05-18 ENCOUNTER — OFFICE VISIT (OUTPATIENT)
Dept: FAMILY MEDICINE | Facility: CLINIC | Age: 73
End: 2023-05-18
Payer: MEDICARE

## 2023-05-18 VITALS
WEIGHT: 258 LBS | RESPIRATION RATE: 18 BRPM | OXYGEN SATURATION: 98 % | SYSTOLIC BLOOD PRESSURE: 126 MMHG | HEART RATE: 67 BPM | BODY MASS INDEX: 38.21 KG/M2 | TEMPERATURE: 98 F | DIASTOLIC BLOOD PRESSURE: 78 MMHG | HEIGHT: 69 IN

## 2023-05-18 DIAGNOSIS — E11.65 TYPE 2 DIABETES MELLITUS WITH HYPERGLYCEMIA, WITHOUT LONG-TERM CURRENT USE OF INSULIN: Primary | ICD-10-CM

## 2023-05-18 DIAGNOSIS — E78.2 MIXED HYPERLIPIDEMIA: ICD-10-CM

## 2023-05-18 DIAGNOSIS — E11.65 TYPE 2 DIABETES MELLITUS WITH HYPERGLYCEMIA, WITHOUT LONG-TERM CURRENT USE OF INSULIN: ICD-10-CM

## 2023-05-18 DIAGNOSIS — R79.89 LOW TESTOSTERONE LEVEL IN MALE: ICD-10-CM

## 2023-05-18 LAB
ALBUMIN SERPL BCP-MCNC: 4.5 G/DL (ref 3.5–5.2)
ALBUMIN/CREAT UR: 3.5 UG/MG (ref 0–30)
ALP SERPL-CCNC: 38 U/L (ref 55–135)
ALT SERPL W/O P-5'-P-CCNC: 42 U/L (ref 10–44)
ANION GAP SERPL CALC-SCNC: 10 MMOL/L (ref 8–16)
AST SERPL-CCNC: 39 U/L (ref 10–40)
BILIRUB SERPL-MCNC: 0.9 MG/DL (ref 0.1–1)
BUN SERPL-MCNC: 29 MG/DL (ref 8–23)
CALCIUM SERPL-MCNC: 9.2 MG/DL (ref 8.7–10.5)
CHLORIDE SERPL-SCNC: 101 MMOL/L (ref 95–110)
CHOLEST SERPL-MCNC: 134 MG/DL (ref 120–199)
CHOLEST/HDLC SERPL: 4.2 {RATIO} (ref 2–5)
CO2 SERPL-SCNC: 24 MMOL/L (ref 23–29)
CREAT SERPL-MCNC: 1.3 MG/DL (ref 0.5–1.4)
CREAT UR-MCNC: 209 MG/DL (ref 23–375)
EST. GFR  (NO RACE VARIABLE): 58.4 ML/MIN/1.73 M^2
ESTIMATED AVG GLUCOSE: 154 MG/DL (ref 68–131)
GLUCOSE SERPL-MCNC: 130 MG/DL (ref 70–110)
HBA1C MFR BLD: 7 % (ref 4.5–6.2)
HDLC SERPL-MCNC: 32 MG/DL (ref 40–75)
HDLC SERPL: 23.9 % (ref 20–50)
LDLC SERPL CALC-MCNC: 70.4 MG/DL (ref 63–159)
MICROALBUMIN UR DL<=1MG/L-MCNC: 7.4 UG/ML
NONHDLC SERPL-MCNC: 102 MG/DL
POTASSIUM SERPL-SCNC: 4.2 MMOL/L (ref 3.5–5.1)
PROT SERPL-MCNC: 8.1 G/DL (ref 6–8.4)
SODIUM SERPL-SCNC: 135 MMOL/L (ref 136–145)
TRIGL SERPL-MCNC: 158 MG/DL (ref 30–150)

## 2023-05-18 PROCEDURE — 99214 OFFICE O/P EST MOD 30 MIN: CPT | Mod: S$PBB,AQ,, | Performed by: FAMILY MEDICINE

## 2023-05-18 PROCEDURE — 80053 COMPREHEN METABOLIC PANEL: CPT | Performed by: FAMILY MEDICINE

## 2023-05-18 PROCEDURE — 36415 COLL VENOUS BLD VENIPUNCTURE: CPT | Performed by: FAMILY MEDICINE

## 2023-05-18 PROCEDURE — 82570 ASSAY OF URINE CREATININE: CPT | Performed by: FAMILY MEDICINE

## 2023-05-18 PROCEDURE — 84403 ASSAY OF TOTAL TESTOSTERONE: CPT | Performed by: FAMILY MEDICINE

## 2023-05-18 PROCEDURE — 80061 LIPID PANEL: CPT | Performed by: FAMILY MEDICINE

## 2023-05-18 PROCEDURE — 99214 PR OFFICE/OUTPT VISIT, EST, LEVL IV, 30-39 MIN: ICD-10-PCS | Mod: S$PBB,AQ,, | Performed by: FAMILY MEDICINE

## 2023-05-18 PROCEDURE — 83036 HEMOGLOBIN GLYCOSYLATED A1C: CPT | Performed by: FAMILY MEDICINE

## 2023-05-18 PROCEDURE — 99215 OFFICE O/P EST HI 40 MIN: CPT | Performed by: FAMILY MEDICINE

## 2023-05-18 NOTE — PROGRESS NOTES
Subjective:       Patient ID: Ciaran Canchola is a 72 y.o. male.    Chief Complaint: Hypertension and Diabetes      Patient is here for follow-up on hypertension and diabetes.  BP Readings from Last 3 Encounters:  05/18/23 : 126/78  02/15/23 : (!) 140/88  09/20/22 : 138/74  Lab Results       Component                Value               Date                       WBC                      8.4                 08/24/2022                 HGB                      15.4                08/24/2022                 HCT                      46.9                08/24/2022                 PLT                      210                 08/24/2022                 CHOL                     139                 02/15/2023                 TRIG                     165 (H)             02/15/2023                 HDL                      33 (L)              02/15/2023                 ALT                      36                  02/15/2023                 AST                      30                  02/15/2023                 NA                       134 (L)             02/15/2023                 K                        4.1                 02/15/2023                 CL                       100                 02/15/2023                 CREATININE               1.4                 02/15/2023                 BUN                      26 (H)              02/15/2023                 CO2                      27                  02/15/2023                 TSH                      1.66                01/27/2021                 HGBA1C                   7.5 (H)             02/15/2023                 MICROALBUR               8.6                 02/11/2022                  Hypertension  This is a chronic problem. The problem has been gradually improving since onset. The problem is controlled. Pertinent negatives include no chest pain, headaches, neck pain or palpitations.   Diabetes  He presents for his follow-up diabetic visit. He has type 2 diabetes  mellitus. His disease course has been stable. Pertinent negatives for hypoglycemia include no confusion or headaches. Pertinent negatives for diabetes include no chest pain, no polydipsia, no polyuria and no weakness. His breakfast blood glucose range is generally 110-130 mg/dl. His lunch blood glucose range is generally 130-140 mg/dl. His dinner blood glucose range is generally 140-180 mg/dl. (Highest 171) An ACE inhibitor/angiotensin II receptor blocker is being taken. Eye exam is current.     Allergies and Medications:   Review of patient's allergies indicates:  No Known Allergies  Current Outpatient Medications   Medication Sig Dispense Refill    blood sugar diagnostic Strp To check BG 2 times daily, to use with insurance preferred meter 200 strip 3    blood-glucose meter kit To check BG 2 times daily, to use with insurance preferred meter 1 each 0    clobetasoL (TEMOVATE) 0.05 % cream Apply topically 2 (two) times daily. 45 g 4    glipiZIDE (GLUCOTROL) 10 MG tablet Take 1 tablet (10 mg total) by mouth daily with breakfast. 90 tablet 1    lancets Misc To check BG 2 times daily, to use with insurance preferred meter 200 each 3    lisinopriL-hydrochlorothiazide (PRINZIDE,ZESTORETIC) 20-25 mg Tab Take 1 tablet by mouth once daily. 90 tablet 1    meloxicam (MOBIC) 15 MG tablet Take 1 tablet (15 mg total) by mouth once daily. 90 tablet 1    sertraline (ZOLOFT) 50 MG tablet Take 2 tablets (100 mg total) by mouth every evening. 180 tablet 1    testosterone cypionate (DEPOTESTOTERONE CYPIONATE) 200 mg/mL injection Inject 1 mL (200 mg total) into the muscle every 14 (fourteen) days. 2 mL 1    esomeprazole (NEXIUM) 40 MG capsule Take 1 capsule (40 mg total) by mouth before breakfast. 90 capsule 1    rosuvastatin (CRESTOR) 5 MG tablet Take 1 tablet (5 mg total) by mouth once daily. 90 tablet 1     No current facility-administered medications for this visit.       Family History:   Family History   Problem Relation Age of  Onset    ALS Mother     Heart disease Father     Heart disease Sister        Social History:   Social History     Socioeconomic History    Marital status:    Tobacco Use    Smoking status: Former     Types: Cigarettes     Quit date:      Years since quittin.4    Smokeless tobacco: Never    Tobacco comments:     Quit  35 years ago   Substance and Sexual Activity    Alcohol use: Yes    Drug use: Never    Sexual activity: Yes     Partners: Female       Review of Systems   Constitutional:  Negative for activity change and unexpected weight change.   HENT:  Positive for hearing loss. Negative for rhinorrhea and trouble swallowing.    Eyes:  Negative for discharge and visual disturbance.   Respiratory:  Negative for chest tightness and wheezing.    Cardiovascular:  Negative for chest pain and palpitations.   Gastrointestinal:  Positive for diarrhea. Negative for blood in stool, constipation and vomiting.   Endocrine: Negative for polydipsia and polyuria.   Genitourinary:  Negative for difficulty urinating, hematuria and urgency.   Musculoskeletal:  Positive for arthralgias. Negative for joint swelling and neck pain.   Neurological:  Negative for weakness and headaches.   Psychiatric/Behavioral:  Negative for confusion and dysphoric mood.      Objective:     Vitals:    23 1007   BP: 126/78   Pulse: 67   Resp: 18   Temp: 97.8 °F (36.6 °C)        Physical Exam  Vitals and nursing note reviewed.   Constitutional:       General: He is not in acute distress.     Appearance: He is well-developed. He is not diaphoretic.   HENT:      Head: Normocephalic.      Right Ear: External ear normal.      Left Ear: External ear normal.      Nose: Nose normal.      Mouth/Throat:      Pharynx: No oropharyngeal exudate.   Eyes:      General: No scleral icterus.        Left eye: No discharge.      Conjunctiva/sclera: Conjunctivae normal.      Pupils: Pupils are equal, round, and reactive to light.   Neck:      Thyroid: No  thyromegaly.      Vascular: No JVD.      Trachea: No tracheal deviation.   Cardiovascular:      Rate and Rhythm: Normal rate and regular rhythm.      Pulses:           Dorsalis pedis pulses are 1+ on the right side and 1+ on the left side.        Posterior tibial pulses are 1+ on the right side and 1+ on the left side.      Heart sounds: Normal heart sounds. No murmur heard.    No friction rub. No gallop.   Pulmonary:      Effort: Pulmonary effort is normal. No respiratory distress.      Breath sounds: Normal breath sounds. No stridor. No wheezing, rhonchi or rales.   Chest:      Chest wall: No tenderness.   Abdominal:      General: Bowel sounds are normal. There is no distension.      Palpations: Abdomen is soft. There is no mass.      Tenderness: There is no abdominal tenderness. There is no guarding or rebound.      Hernia: No hernia is present.   Genitourinary:     Penis: Normal. No tenderness.       Prostate: Normal.      Rectum: Normal. Guaiac result negative.   Musculoskeletal:         General: No tenderness. Normal range of motion.      Cervical back: Normal range of motion and neck supple.      Right lower leg: No edema.      Left lower leg: No edema.      Right foot: Normal range of motion. Deformity and bunion present. No Charcot foot, foot drop or prominent metatarsal heads.      Left foot: Normal range of motion. Deformity (Hammertoe hammertoe) and bunion present. No Charcot foot, foot drop or prominent metatarsal heads.   Feet:      Right foot:      Protective Sensation: 4 sites tested.  4 sites sensed.      Skin integrity: Skin integrity normal. No ulcer, blister, skin breakdown, erythema, warmth, callus, dry skin or fissure.      Toenail Condition: Right toenails are normal.      Left foot:      Protective Sensation: 4 sites tested.  4 sites sensed.      Skin integrity: Skin integrity normal. No ulcer, blister, skin breakdown, erythema, warmth, callus, dry skin or fissure.      Toenail Condition:  Left toenails are normal.   Lymphadenopathy:      Cervical: No cervical adenopathy.   Skin:     General: Skin is warm and dry.      Coloration: Skin is not pale.      Findings: No erythema or rash.   Neurological:      Mental Status: He is alert and oriented to person, place, and time.      Cranial Nerves: No cranial nerve deficit.      Motor: No abnormal muscle tone.      Coordination: Coordination normal.      Deep Tendon Reflexes: Reflexes are normal and symmetric. Reflexes normal.   Psychiatric:         Behavior: Behavior normal.         Thought Content: Thought content normal.         Judgment: Judgment normal.       Assessment:       1. Type 2 diabetes mellitus with hyperglycemia, without long-term current use of insulin    2. Mixed hyperlipidemia    3. Low testosterone level in male        Plan:       Ciaran was seen today for hypertension and diabetes.    Diagnoses and all orders for this visit:    Type 2 diabetes mellitus with hyperglycemia, without long-term current use of insulin  -     Microalbumin/Creatinine Ratio, Urine; Future  -     Hemoglobin A1C; Future  -     Ambulatory referral/consult to Podiatry; Future    Mixed hyperlipidemia  -     Lipid Panel; Future  -     Comprehensive Metabolic Panel; Future    Low testosterone level in male  -     Testosterone; Future         Follow up in about 6 months (around 11/18/2023) for follow up DM, follow up HTN.

## 2023-05-18 NOTE — TELEPHONE ENCOUNTER
----- Message from Obie Argueta MD sent at 5/18/2023 12:45 PM CDT -----  Lab reviewed: all OK. Please notify pt. Continue pres meds.

## 2023-05-19 ENCOUNTER — TELEPHONE (OUTPATIENT)
Dept: FAMILY MEDICINE | Facility: CLINIC | Age: 73
End: 2023-05-19

## 2023-05-19 LAB — TESTOST SERPL-MCNC: 144 NG/DL (ref 264–916)

## 2023-05-19 NOTE — TELEPHONE ENCOUNTER
----- Message from Obie Argueta MD sent at 5/19/2023 12:21 PM CDT -----  Slightly improved but still slightly low.  Would recommend DHEA as directed.

## 2023-05-29 NOTE — PATIENT INSTRUCTIONS
Bunion    You have a bunion. This is a bony bump at the base of your big toe, along the inside edge of your foot. As the bump gets bigger, it can become red, swollen, and painful with shoe wear.  Bunions may occur if you wear shoes that are too tight and pinch your toes together. High heels may make this worse. In some cases a bunion is due to poor alignment of the foot and ankle. This puts extra weight on the instep of each foot.  Once a bunion forms, it changes the way weight is spread all across your foot. This causes the bunion to get worse over time. The big toe will bend more and more toward the other toes.  A minor bunion can be treated by:  Wearing properly fitting shoes  Using bunion pads  Wearing shoe inserts, called orthotics, to better align the foot and ankle  Physical therapy with ultrasound or whirlpool baths can ease pain, redness, and swelling. Severe cases may require surgery. If you dont treat what is causing the bunion, it may get larger and more painful.  Home care  Limit high heels. These shoes force your foot forward, crowding the toes together.  Switch to comfortable shoes with a wide toe area. Or have your existing shoes stretched by a shoe repair shop.  Avoid shoes that are tight, narrow, or pointed.  If you are flat-footed, using arch supports may help prevent further deformity. The best shoe inserts are the ones custom made by a foot specialist, called a podiatrist, or other healthcare provider.  Put a bunion pad over the bunion to ease pressure of your shoe against the bunion. You can buy these pads at most pharmacies without a prescription  To reduce pain and swelling, apply an ice pack over the injured area for 15 to 20 minutes. Do this every 1 to 2 hours the first day. Keep using ice 3 to 4 times a day until the pain and swelling goes away.  To make an ice pack, put ice cubes in a sealed zip-lock plastic bag. Wrap the bag in a clean, thin towel or cloth. Never put ice or an ice pack  directly on the skin.  You may use over-the-counter pain medicine to control pain, unless another medicine was prescribed. Talk with your provider before using these medicines if you have chronic liver or kidney disease, or ever had a stomach ulcer or GI (gastrointestinal) bleeding.  Follow-up care  Follow up with a podiatrist or foot doctor, or as advised.  If X-rays were taken, you will be notified of any new findings that may affect your care.  When to seek medical care  Contact your healthcare provider if any of the following occur:  Increasing pain or redness around the base of the big toe  Painful ingrown toenail, with redness and swelling or pus around the nail  Date Last Reviewed: 11/21/2015  © 2800-7595 [employer]. [address] [place] [address], [place] [address]. All rights reserved. This information is not intended as a substitute for professional medical care. Always follow your healthcare professional's instructions.     What Are Mallet, Hammer, and Claw Toes?  Mallet, hammer, and claw toes are most often caused by wearing shoes that are too short or heels that are too high. This jams the toes against the front of the shoe and causes one or more joints to bend. Rarely, disease can cause the joints in the toes to bend. Mallet, hammer, and claw toes are among the most common toe problems. They occur most often in the longest of the four smaller toes.      Inside your toes  There are 3 bones in each of your 4 smaller toes. Where 2 bones connect is called a joint. Normally the toes lie flat. But pressure on the toes or the front of the foot can cause one or more joints to bend. This curls the toe. Toes that stay curled are called mallet toes, hammer toes, or claw toes, depending on which joints are bent.    Symptoms  You may feel pain in the toe or in the ball of your foot. A corn (a hard growth of skin on the top of the toe) may form where the toe rubs against the top of the shoe. Or a callus (a  hard growth of skin on the bottom of the foot) may form under the tip of the toe or on the ball of the foot. Corns and calluses can also be painful.    Date Last Reviewed: 10/18/2015  © 9232-5952 [employer]. [address] [place] [address]. All rights reserved. This information is not intended as a substitute for professional medical care. Always follow your healthcare professional's instructions.

## 2023-06-05 ENCOUNTER — HOSPITAL ENCOUNTER (OUTPATIENT)
Dept: RADIOLOGY | Facility: CLINIC | Age: 73
Discharge: HOME | End: 2023-06-05
Attending: PODIATRIST
Payer: MEDICARE

## 2023-06-05 ENCOUNTER — OFFICE VISIT (OUTPATIENT)
Dept: PODIATRY | Facility: CLINIC | Age: 73
End: 2023-06-05
Payer: MEDICARE

## 2023-06-05 VITALS — HEART RATE: 66 BPM | OXYGEN SATURATION: 96 %

## 2023-06-05 DIAGNOSIS — M20.5X1 ACQUIRED HALLUX LIMITUS OF BOTH FEET: ICD-10-CM

## 2023-06-05 DIAGNOSIS — M20.5X2 ACQUIRED HALLUX LIMITUS OF BOTH FEET: ICD-10-CM

## 2023-06-05 DIAGNOSIS — M79.671 RIGHT FOOT PAIN: ICD-10-CM

## 2023-06-05 DIAGNOSIS — M20.41 HAMMER TOES OF BOTH FEET: Primary | ICD-10-CM

## 2023-06-05 DIAGNOSIS — M20.42 HAMMER TOES OF BOTH FEET: Primary | ICD-10-CM

## 2023-06-05 PROCEDURE — 73630 X-RAY EXAM OF FOOT: CPT | Mod: RT,S$GLB,, | Performed by: RADIOLOGY

## 2023-06-05 PROCEDURE — 73630 XR FOOT COMPLETE 3 VIEW RIGHT: ICD-10-PCS | Mod: RT,S$GLB,, | Performed by: RADIOLOGY

## 2023-06-05 PROCEDURE — 99999 PR PBB SHADOW E&M-EST. PATIENT-LVL III: CPT | Mod: PBBFAC,,, | Performed by: PODIATRIST

## 2023-06-05 PROCEDURE — 99203 PR OFFICE/OUTPT VISIT, NEW, LEVL III, 30-44 MIN: ICD-10-PCS | Mod: S$PBB,,, | Performed by: PODIATRIST

## 2023-06-05 PROCEDURE — 99213 OFFICE O/P EST LOW 20 MIN: CPT | Mod: PBBFAC,PN | Performed by: PODIATRIST

## 2023-06-05 PROCEDURE — 99203 OFFICE O/P NEW LOW 30 MIN: CPT | Mod: S$PBB,,, | Performed by: PODIATRIST

## 2023-06-05 PROCEDURE — 99999 PR PBB SHADOW E&M-EST. PATIENT-LVL III: ICD-10-PCS | Mod: PBBFAC,,, | Performed by: PODIATRIST

## 2023-06-05 NOTE — PROGRESS NOTES
[address] [facility] [address]  [place] [address]  Phone: [phone]   [phone]    Patient's PCP:Obie Argueta MD  Referring Provider: Aaareferral Self    Subjective:      Chief Complaint:: Hammer Toe (Bilateral)    ANDREA Canchola is a 72 y.o. male who presents today with a complaint of hammertoes bilateral.  Patient states they have been present for many years.  Denies any pain associated.  Treatment to date have included more accommodative shoes.    Systemic Doctor: Obie Argueta  Date Last Seen: 5/18/23      Vitals:    06/05/23 1040   Pulse: 66   SpO2: 96%   PainSc: 0-No pain      Shoe Size: 12    Past Surgical History:   Procedure Laterality Date    CHOLECYSTECTOMY      COLONOSCOPY  2018    RTC in 5 years. done in [place]    TONSILLECTOMY      UMBILICAL HERNIA REPAIR       Past Medical History:   Diagnosis Date    Depression     Hyperlipidemia      Family History   Problem Relation Age of Onset    ALS Mother     Heart disease Father     Heart disease Sister         Social History:   Marital Status:   Alcohol History:  reports current alcohol use.  Tobacco History:  reports that he quit smoking about 38 years ago. His smoking use included cigarettes. He has never used smokeless tobacco.  Drug History:  reports no history of drug use.    Review of patient's allergies indicates:  No Known Allergies    Current Outpatient Medications   Medication Sig Dispense Refill    blood sugar diagnostic Strp To check BG 2 times daily, to use with insurance preferred meter 200 strip 3    blood-glucose meter kit To check BG 2 times daily, to use with insurance preferred meter 1 each 0    clobetasoL (TEMOVATE) 0.05 % cream Apply topically 2 (two) times daily. 45 g 4    esomeprazole (NEXIUM) 40 MG capsule Take 1 capsule (40 mg total) by mouth before breakfast. 90 capsule 1    glipiZIDE (GLUCOTROL) 10 MG tablet Take 1 tablet (10 mg total) by mouth daily with breakfast. 90 tablet 1    lancets Misc To check  BG 2 times daily, to use with insurance preferred meter 200 each 3    lisinopriL-hydrochlorothiazide (PRINZIDE,ZESTORETIC) 20-25 mg Tab Take 1 tablet by mouth once daily. 90 tablet 1    meloxicam (MOBIC) 15 MG tablet Take 1 tablet (15 mg total) by mouth once daily. 90 tablet 1    rosuvastatin (CRESTOR) 5 MG tablet Take 1 tablet (5 mg total) by mouth once daily. 90 tablet 1    sertraline (ZOLOFT) 50 MG tablet Take 2 tablets (100 mg total) by mouth every evening. 180 tablet 1    testosterone cypionate (DEPOTESTOTERONE CYPIONATE) 200 mg/mL injection Inject 1 mL (200 mg total) into the muscle every 14 (fourteen) days. 2 mL 1     No current facility-administered medications for this visit.       Review of Systems   Constitutional:  Negative for chills, fatigue, fever and unexpected weight change.   HENT:  Negative for hearing loss and trouble swallowing.    Eyes:  Negative for photophobia and visual disturbance.   Respiratory:  Negative for cough, shortness of breath and wheezing.    Cardiovascular:  Negative for chest pain, palpitations and leg swelling.   Gastrointestinal:  Negative for abdominal pain and nausea.   Genitourinary:  Negative for dysuria and frequency.   Musculoskeletal:  Positive for arthralgias and back pain. Negative for gait problem, joint swelling and myalgias.   Skin:  Negative for rash and wound.   Neurological:  Negative for tremors, seizures, weakness, numbness and headaches.   Hematological:  Does not bruise/bleed easily.       Objective:        Physical Exam:   Foot Exam    General  General Appearance: appears stated age and healthy   Orientation: alert and oriented to person, place, and time   Affect: appropriate   Gait: unimpaired       Right Foot/Ankle     Inspection and Palpation  Ecchymosis: none  Tenderness: none   Swelling: none   Arch: normal  Hammertoes: second toe, third toe, fourth toe and fifth toe  Hallux limitus: yes  Skin Exam: skin intact; no drainage, no ulcer and no erythema    Neurovascular  Dorsalis pedis: 2+  Posterior tibial: 2+  Capillary Refill: 2+  Varicose veins: not present  Saphenous nerve sensation: normal  Tibial nerve sensation: normal  Superficial peroneal nerve sensation: normal  Deep peroneal nerve sensation: normal  Sural nerve sensation: normal    Edema  Type of edema: non-pitting    Muscle Strength  Ankle dorsiflexion: 5  Ankle plantar flexion: 5  Ankle inversion: 5  Ankle eversion: 5  Great toe extension: 5  Great toe flexion: 5    Range of Motion    Normal right ankle ROM    Tests  Anterior drawer: negative   Talar tilt: negative   PT Tinel's sign: negative    Paresthesia: negative    Left Foot/Ankle      Inspection and Palpation  Ecchymosis: none  Tenderness: none   Swelling: none   Arch: normal  Hammertoes: second toe, third toe, fourth toe and fifth toe  Hallux limitus: yes  Skin Exam: skin intact; no drainage, no ulcer and no erythema   Neurovascular  Dorsalis pedis: 2+  Posterior tibial: 2+  Capillary refill: 2+  Varicose veins: not present  Saphenous nerve sensation: normal  Tibial nerve sensation: normal  Superficial peroneal nerve sensation: normal  Deep peroneal nerve sensation: normal  Sural nerve sensation: normal    Edema  Type of edema: non-pitting    Muscle Strength  Ankle dorsiflexion: 5  Ankle plantar flexion: 5  Ankle inversion: 5  Ankle eversion: 5  Great toe extension: 5  Great toe flexion: 5    Range of Motion    Normal left ankle ROM    Tests  Anterior drawer: negative   Talar tilt: negative   PT Tinel's sign: negative  Paresthesia: negative    Physical Exam  Cardiovascular:      Pulses:           Dorsalis pedis pulses are 2+ on the right side and 2+ on the left side.        Posterior tibial pulses are 2+ on the right side and 2+ on the left side.   Feet:      Right foot:      Skin integrity: No ulcer or erythema.      Left foot:      Skin integrity: No ulcer or erythema.             Right Ankle/Foot Exam     Range of Motion   The patient has  normal right ankle ROM.    Left Ankle/Foot Exam     Range of Motion   The patient has normal left ankle ROM.       Muscle Strength   Right Lower Extremity   Ankle Dorsiflexion:  5   Plantar flexion:  5/5  Left Lower Extremity   Ankle Dorsiflexion:  5   Plantar flexion:  5/5     Vascular Exam     Right Pulses  Dorsalis Pedis:      2+  Posterior Tibial:      2+        Left Pulses  Dorsalis Pedis:      2+  Posterior Tibial:      2+         Imaging: X-Ray Foot Complete Right  Narrative: EXAMINATION:  XR FOOT COMPLETE 3 VIEW RIGHT    CLINICAL HISTORY:  . Pain in right foot    TECHNIQUE:  AP, lateral, and oblique views of the right foot were performed.    COMPARISON:  None    FINDINGS:  Prominent plantar calcaneal spur and enthesophytes at site of insertion of the Achilles tendon.  Mild scattered degenerative changes.  No acute fracture or dislocation.  Prominent hammertoe configuration of 2nd toe.  Impression: As above    Electronically signed by: Margie Gardy MD  Date:    06/05/2023  Time:    13:18               Assessment:       1. Hammer toes of both feet    2. Acquired hallux limitus of both feet    3. Right foot pain      Plan:   Hammer toes of both feet  -     Ambulatory referral/consult to Podiatry    Acquired hallux limitus of both feet    Right foot pain  -     X-Ray Foot Complete Right      Follow up if symptoms worsen or fail to improve.    Procedures        Discussed the patient finding of hammertoe deformity bilaterally as well as mild arthritic changes of the great toe joints.  I explained that as these are a courtesy symptomatic to not recommend any specific intervention.  We did discuss proper accommodative shoe gear.  We also discussed different types of padding and offloading for the hammertoes if they were to become painful.    Counseling:     I provided patient education verbally regarding:   Patient diagnosis, treatment options, as well as alternatives, risks, and benefits.     This note was  created using Dragon voice recognition software that occasionally misinterpreted phrases or words.

## 2023-08-06 DIAGNOSIS — E11.65 TYPE 2 DIABETES MELLITUS WITH HYPERGLYCEMIA, WITHOUT LONG-TERM CURRENT USE OF INSULIN: ICD-10-CM

## 2023-08-06 DIAGNOSIS — F33.42 RECURRENT MAJOR DEPRESSIVE DISORDER, IN FULL REMISSION: ICD-10-CM

## 2023-08-07 RX ORDER — SERTRALINE HYDROCHLORIDE 50 MG/1
TABLET, FILM COATED ORAL
Qty: 180 TABLET | Refills: 1 | Status: SHIPPED | OUTPATIENT
Start: 2023-08-07 | End: 2024-02-06 | Stop reason: SDUPTHER

## 2023-08-07 RX ORDER — GLIPIZIDE 10 MG/1
10 TABLET ORAL
Qty: 90 TABLET | Refills: 1 | Status: SHIPPED | OUTPATIENT
Start: 2023-08-07

## 2023-09-09 DIAGNOSIS — M19.90 ARTHRITIS: ICD-10-CM

## 2023-09-11 RX ORDER — MELOXICAM 15 MG/1
15 TABLET ORAL DAILY
Qty: 90 TABLET | Refills: 1 | Status: SHIPPED | OUTPATIENT
Start: 2023-09-11 | End: 2023-09-26 | Stop reason: SDUPTHER

## 2023-09-26 DIAGNOSIS — I10 ESSENTIAL HYPERTENSION: ICD-10-CM

## 2023-09-26 DIAGNOSIS — M19.90 ARTHRITIS: ICD-10-CM

## 2023-09-26 RX ORDER — MELOXICAM 15 MG/1
15 TABLET ORAL DAILY
Qty: 90 TABLET | Refills: 1 | Status: SHIPPED | OUTPATIENT
Start: 2023-09-26

## 2023-09-26 RX ORDER — LISINOPRIL AND HYDROCHLOROTHIAZIDE 20; 25 MG/1; MG/1
1 TABLET ORAL DAILY
Qty: 90 TABLET | Refills: 1 | Status: SHIPPED | OUTPATIENT
Start: 2023-09-26 | End: 2024-03-20 | Stop reason: SDUPTHER

## 2023-11-20 ENCOUNTER — OFFICE VISIT (OUTPATIENT)
Dept: FAMILY MEDICINE | Facility: CLINIC | Age: 73
End: 2023-11-20
Payer: MEDICARE

## 2023-11-20 ENCOUNTER — LAB VISIT (OUTPATIENT)
Dept: LAB | Facility: HOSPITAL | Age: 73
End: 2023-11-20
Attending: FAMILY MEDICINE
Payer: MEDICARE

## 2023-11-20 VITALS
HEIGHT: 69 IN | RESPIRATION RATE: 18 BRPM | BODY MASS INDEX: 39.55 KG/M2 | SYSTOLIC BLOOD PRESSURE: 130 MMHG | OXYGEN SATURATION: 97 % | DIASTOLIC BLOOD PRESSURE: 70 MMHG | TEMPERATURE: 99 F | HEART RATE: 61 BPM | WEIGHT: 267 LBS

## 2023-11-20 DIAGNOSIS — E78.2 MIXED HYPERLIPIDEMIA: ICD-10-CM

## 2023-11-20 DIAGNOSIS — E11.65 TYPE 2 DIABETES MELLITUS WITH HYPERGLYCEMIA, WITHOUT LONG-TERM CURRENT USE OF INSULIN: ICD-10-CM

## 2023-11-20 DIAGNOSIS — Z00.00 PREVENTATIVE HEALTH CARE: ICD-10-CM

## 2023-11-20 DIAGNOSIS — I10 ESSENTIAL HYPERTENSION: ICD-10-CM

## 2023-11-20 DIAGNOSIS — Z23 NEED FOR VACCINATION: Primary | ICD-10-CM

## 2023-11-20 LAB
ALBUMIN SERPL BCP-MCNC: 4.3 G/DL (ref 3.5–5.2)
ALBUMIN/CREAT UR: NORMAL UG/MG (ref 0–30)
ALP SERPL-CCNC: 45 U/L (ref 55–135)
ALT SERPL W/O P-5'-P-CCNC: 32 U/L (ref 10–44)
ANION GAP SERPL CALC-SCNC: 6 MMOL/L (ref 8–16)
AST SERPL-CCNC: 23 U/L (ref 10–40)
BILIRUB SERPL-MCNC: 0.5 MG/DL (ref 0.1–1)
BUN SERPL-MCNC: 24 MG/DL (ref 8–23)
CALCIUM SERPL-MCNC: 9.4 MG/DL (ref 8.7–10.5)
CHLORIDE SERPL-SCNC: 102 MMOL/L (ref 95–110)
CHOLEST SERPL-MCNC: 132 MG/DL (ref 120–199)
CHOLEST/HDLC SERPL: 3.5 {RATIO} (ref 2–5)
CO2 SERPL-SCNC: 27 MMOL/L (ref 23–29)
CREAT SERPL-MCNC: 1.3 MG/DL (ref 0.5–1.4)
CREAT UR-MCNC: 140.1 MG/DL (ref 23–375)
EST. GFR  (NO RACE VARIABLE): 58 ML/MIN/1.73 M^2
ESTIMATED AVG GLUCOSE: 160 MG/DL (ref 68–131)
GLUCOSE SERPL-MCNC: 130 MG/DL (ref 70–110)
HBA1C MFR BLD: 7.2 % (ref 4.5–6.2)
HDLC SERPL-MCNC: 38 MG/DL (ref 40–75)
HDLC SERPL: 28.8 % (ref 20–50)
LDLC SERPL CALC-MCNC: 52.6 MG/DL (ref 63–159)
LEFT EYE DM RETINOPATHY: NEGATIVE
MICROALBUMIN UR DL<=1MG/L-MCNC: <7 UG/ML
NONHDLC SERPL-MCNC: 94 MG/DL
POTASSIUM SERPL-SCNC: 4.1 MMOL/L (ref 3.5–5.1)
PROT SERPL-MCNC: 7.2 G/DL (ref 6–8.4)
RIGHT EYE DM RETINOPATHY: NEGATIVE
SODIUM SERPL-SCNC: 135 MMOL/L (ref 136–145)
TRIGL SERPL-MCNC: 207 MG/DL (ref 30–150)

## 2023-11-20 PROCEDURE — G0008 ADMIN INFLUENZA VIRUS VAC: HCPCS | Mod: S$GLB,,, | Performed by: FAMILY MEDICINE

## 2023-11-20 PROCEDURE — 36415 COLL VENOUS BLD VENIPUNCTURE: CPT | Performed by: FAMILY MEDICINE

## 2023-11-20 PROCEDURE — 1125F PR PAIN SEVERITY QUANTIFIED, PAIN PRESENT: ICD-10-PCS | Mod: CPTII,S$GLB,, | Performed by: FAMILY MEDICINE

## 2023-11-20 PROCEDURE — 99214 OFFICE O/P EST MOD 30 MIN: CPT | Mod: S$GLB,,, | Performed by: FAMILY MEDICINE

## 2023-11-20 PROCEDURE — 1101F PR PT FALLS ASSESS DOC 0-1 FALLS W/OUT INJ PAST YR: ICD-10-PCS | Mod: CPTII,S$GLB,, | Performed by: FAMILY MEDICINE

## 2023-11-20 PROCEDURE — 3078F PR MOST RECENT DIASTOLIC BLOOD PRESSURE < 80 MM HG: ICD-10-PCS | Mod: CPTII,S$GLB,, | Performed by: FAMILY MEDICINE

## 2023-11-20 PROCEDURE — 90662 IIV NO PRSV INCREASED AG IM: CPT | Mod: S$GLB,,, | Performed by: FAMILY MEDICINE

## 2023-11-20 PROCEDURE — 1159F MED LIST DOCD IN RCRD: CPT | Mod: CPTII,S$GLB,, | Performed by: FAMILY MEDICINE

## 2023-11-20 PROCEDURE — 82043 UR ALBUMIN QUANTITATIVE: CPT | Performed by: FAMILY MEDICINE

## 2023-11-20 PROCEDURE — 99214 PR OFFICE/OUTPT VISIT, EST, LEVL IV, 30-39 MIN: ICD-10-PCS | Mod: S$GLB,,, | Performed by: FAMILY MEDICINE

## 2023-11-20 PROCEDURE — 3008F PR BODY MASS INDEX (BMI) DOCUMENTED: ICD-10-PCS | Mod: CPTII,S$GLB,, | Performed by: FAMILY MEDICINE

## 2023-11-20 PROCEDURE — 3051F PR MOST RECENT HEMOGLOBIN A1C LEVEL 7.0 - < 8.0%: ICD-10-PCS | Mod: CPTII,S$GLB,, | Performed by: FAMILY MEDICINE

## 2023-11-20 PROCEDURE — 3078F DIAST BP <80 MM HG: CPT | Mod: CPTII,S$GLB,, | Performed by: FAMILY MEDICINE

## 2023-11-20 PROCEDURE — 4010F ACE/ARB THERAPY RXD/TAKEN: CPT | Mod: CPTII,S$GLB,, | Performed by: FAMILY MEDICINE

## 2023-11-20 PROCEDURE — 80061 LIPID PANEL: CPT | Performed by: FAMILY MEDICINE

## 2023-11-20 PROCEDURE — 3061F NEG MICROALBUMINURIA REV: CPT | Mod: CPTII,S$GLB,, | Performed by: FAMILY MEDICINE

## 2023-11-20 PROCEDURE — 3075F PR MOST RECENT SYSTOLIC BLOOD PRESS GE 130-139MM HG: ICD-10-PCS | Mod: CPTII,S$GLB,, | Performed by: FAMILY MEDICINE

## 2023-11-20 PROCEDURE — 3066F NEPHROPATHY DOC TX: CPT | Mod: CPTII,S$GLB,, | Performed by: FAMILY MEDICINE

## 2023-11-20 PROCEDURE — 3066F PR DOCUMENTATION OF TREATMENT FOR NEPHROPATHY: ICD-10-PCS | Mod: CPTII,S$GLB,, | Performed by: FAMILY MEDICINE

## 2023-11-20 PROCEDURE — 3288F PR FALLS RISK ASSESSMENT DOCUMENTED: ICD-10-PCS | Mod: CPTII,S$GLB,, | Performed by: FAMILY MEDICINE

## 2023-11-20 PROCEDURE — 1101F PT FALLS ASSESS-DOCD LE1/YR: CPT | Mod: CPTII,S$GLB,, | Performed by: FAMILY MEDICINE

## 2023-11-20 PROCEDURE — 83036 HEMOGLOBIN GLYCOSYLATED A1C: CPT | Performed by: FAMILY MEDICINE

## 2023-11-20 PROCEDURE — 3075F SYST BP GE 130 - 139MM HG: CPT | Mod: CPTII,S$GLB,, | Performed by: FAMILY MEDICINE

## 2023-11-20 PROCEDURE — 4010F PR ACE/ARB THEARPY RXD/TAKEN: ICD-10-PCS | Mod: CPTII,S$GLB,, | Performed by: FAMILY MEDICINE

## 2023-11-20 PROCEDURE — 1125F AMNT PAIN NOTED PAIN PRSNT: CPT | Mod: CPTII,S$GLB,, | Performed by: FAMILY MEDICINE

## 2023-11-20 PROCEDURE — 1159F PR MEDICATION LIST DOCUMENTED IN MEDICAL RECORD: ICD-10-PCS | Mod: CPTII,S$GLB,, | Performed by: FAMILY MEDICINE

## 2023-11-20 PROCEDURE — 90662 FLU VACCINE - QUADRIVALENT - HIGH DOSE (65+) PRESERVATIVE FREE IM: ICD-10-PCS | Mod: S$GLB,,, | Performed by: FAMILY MEDICINE

## 2023-11-20 PROCEDURE — 3008F BODY MASS INDEX DOCD: CPT | Mod: CPTII,S$GLB,, | Performed by: FAMILY MEDICINE

## 2023-11-20 PROCEDURE — 3051F HG A1C>EQUAL 7.0%<8.0%: CPT | Mod: CPTII,S$GLB,, | Performed by: FAMILY MEDICINE

## 2023-11-20 PROCEDURE — 3288F FALL RISK ASSESSMENT DOCD: CPT | Mod: CPTII,S$GLB,, | Performed by: FAMILY MEDICINE

## 2023-11-20 PROCEDURE — G0008 FLU VACCINE - QUADRIVALENT - HIGH DOSE (65+) PRESERVATIVE FREE IM: ICD-10-PCS | Mod: S$GLB,,, | Performed by: FAMILY MEDICINE

## 2023-11-20 PROCEDURE — 80053 COMPREHEN METABOLIC PANEL: CPT | Performed by: FAMILY MEDICINE

## 2023-11-20 PROCEDURE — 3061F PR NEG MICROALBUMINURIA RESULT DOCUMENTED/REVIEW: ICD-10-PCS | Mod: CPTII,S$GLB,, | Performed by: FAMILY MEDICINE

## 2023-11-20 RX ORDER — CHOLECALCIFEROL (VITAMIN D3) 50 MCG
1 TABLET ORAL DAILY
Qty: 30 TABLET | Refills: 11 | Status: SHIPPED | OUTPATIENT
Start: 2023-11-20 | End: 2024-11-19

## 2023-11-20 NOTE — PROGRESS NOTES
Subjective:       Patient ID: Ciaran Canchola is a 73 y.o. male.    Chief Complaint: Hypertension and Diabetes      Patient is here for follow-up on hypertension and diabetes.  BP Readings from Last 3 Encounters:  11/20/23 : 130/70 home measurements have been in the 120s to 130s systolic.  05/18/23 : 126/78  02/15/23 : (!) 140/88  Lab Results       Component                Value               Date                       WBC                      8.4                 08/24/2022                 HGB                      15.4                08/24/2022                 HCT                      46.9                08/24/2022                 PLT                      210                 08/24/2022                 CHOL                     134                 05/18/2023                 TRIG                     158 (H)             05/18/2023                 HDL                      32 (L)              05/18/2023                 ALT                      42                  05/18/2023                 AST                      39                  05/18/2023                 NA                       135 (L)             05/18/2023                 K                        4.2                 05/18/2023                 CL                       101                 05/18/2023                 CREATININE               1.3                 05/18/2023                 BUN                      29 (H)              05/18/2023                 CO2                      24                  05/18/2023                 TSH                      1.66                01/27/2021                 HGBA1C                   7.0 (H)             05/18/2023                 MICROALBUR               8.6                 02/11/2022                  Hypertension  This is a chronic problem. The current episode started today. The problem is unchanged. The problem is controlled. Pertinent negatives include no headaches. There is no history of CVA.   Diabetes  He presents for his  follow-up diabetic visit. He has type 2 diabetes mellitus. MedicAlert identification noted. Pertinent negatives for hypoglycemia include no confusion, dizziness, headaches or hunger. Pertinent negatives for hypoglycemia complications include no blackouts, no hospitalization and no nocturnal hypoglycemia. Symptoms are stable. Pertinent negatives for diabetic complications include no autonomic neuropathy, CVA, heart disease or impotence. Eye exam is current (20/20).       Allergies and Medications:   Review of patient's allergies indicates:  No Known Allergies  Current Outpatient Medications   Medication Sig Dispense Refill    blood sugar diagnostic Strp To check BG 2 times daily, to use with insurance preferred meter 200 strip 3    blood-glucose meter kit To check BG 2 times daily, to use with insurance preferred meter 1 each 0    clobetasoL (TEMOVATE) 0.05 % cream Apply topically 2 (two) times daily. 45 g 4    esomeprazole (NEXIUM) 40 MG capsule Take 1 capsule (40 mg total) by mouth before breakfast. 90 capsule 1    glipiZIDE (GLUCOTROL) 10 MG tablet TAKE 1 TABLET(10 MG) BY MOUTH DAILY WITH BREAKFAST 90 tablet 1    lancets Misc To check BG 2 times daily, to use with insurance preferred meter 200 each 3    lisinopriL-hydrochlorothiazide (PRINZIDE,ZESTORETIC) 20-25 mg Tab Take 1 tablet by mouth once daily. 90 tablet 1    meloxicam (MOBIC) 15 MG tablet Take 1 tablet (15 mg total) by mouth once daily. 90 tablet 1    rosuvastatin (CRESTOR) 5 MG tablet Take 1 tablet (5 mg total) by mouth once daily. 90 tablet 1    sertraline (ZOLOFT) 50 MG tablet TAKE 2 TABLETS(100 MG) BY MOUTH EVERY EVENING 180 tablet 1    cholecalciferol, vitamin D3, (VITAMIN D3) 50 mcg (2,000 unit) Tab Take 1 tablet (2,000 Units total) by mouth once daily. 30 tablet 11    empagliflozin (JARDIANCE) 10 mg tablet Take 1 tablet (10 mg total) by mouth once daily. 30 tablet 5     No current facility-administered medications for this visit.       Family  History:   Family History   Problem Relation Age of Onset    ALS Mother     Heart disease Father     Heart disease Sister        Social History:   Social History     Socioeconomic History    Marital status:    Tobacco Use    Smoking status: Former     Current packs/day: 0.00     Types: Cigarettes     Quit date:      Years since quittin.9    Smokeless tobacco: Never    Tobacco comments:     Quit  35 years ago   Substance and Sexual Activity    Alcohol use: Yes    Drug use: Never    Sexual activity: Yes     Partners: Female     Social Determinants of Health     Stress: No Stress Concern Present (2020)    [facility] [place] [facility] [employer]ational [employer] - Occupational Stress Questionnaire     Feeling of Stress : Only a little       Review of Systems   Genitourinary:  Negative for impotence.   Neurological:  Negative for dizziness and headaches.   Psychiatric/Behavioral:  Negative for confusion.        Objective:     Vitals:    23 0819   BP: 130/70   Pulse: 61   Resp: 18   Temp: 98.6 °F (37 °C)        Physical Exam  Vitals and nursing note reviewed.   Constitutional:       General: He is not in acute distress.     Appearance: Normal appearance. He is well-developed and normal weight. He is not ill-appearing, toxic-appearing or diaphoretic.   HENT:      Head: Normocephalic.   Eyes:      Conjunctiva/sclera: Conjunctivae normal.      Pupils: Pupils are equal, round, and reactive to light.   Neck:      Vascular: No carotid bruit.   Cardiovascular:      Rate and Rhythm: Normal rate and regular rhythm.      Heart sounds: Normal heart sounds. No murmur heard.     No friction rub. No gallop.   Pulmonary:      Effort: Pulmonary effort is normal. No respiratory distress.      Breath sounds: Normal breath sounds. No stridor. No wheezing, rhonchi or rales.   Chest:      Chest wall: No tenderness.   Musculoskeletal:      Cervical back: Normal range of motion and neck supple. No rigidity or tenderness.      Right  lower leg: No edema.      Left lower leg: No edema.   Lymphadenopathy:      Cervical: No cervical adenopathy.   Skin:     General: Skin is warm and dry.   Neurological:      Mental Status: He is alert.   Psychiatric:         Behavior: Behavior normal.         Thought Content: Thought content normal.         Judgment: Judgment normal.         Assessment:       1. Need for vaccination    2. Type 2 diabetes mellitus with hyperglycemia, without long-term current use of insulin    3. Essential hypertension    4. Mixed hyperlipidemia    5. Preventative health care        Plan:       Ciaran was seen today for hypertension and diabetes.    Diagnoses and all orders for this visit:    Need for vaccination  -     Influenza - Quadrivalent - High Dose (65+) (PF) (IM)    Type 2 diabetes mellitus with hyperglycemia, without long-term current use of insulin  -     Microalbumin/Creatinine Ratio, Urine; Future  -     Comprehensive Metabolic Panel; Future  -     Hemoglobin A1C; Future  -     Ambulatory referral/consult to Optometry; Future    Essential hypertension    Mixed hyperlipidemia  -     Lipid Panel; Future  -     Comprehensive Metabolic Panel; Future    Preventative health care  -     cholecalciferol, vitamin D3, (VITAMIN D3) 50 mcg (2,000 unit) Tab; Take 1 tablet (2,000 Units total) by mouth once daily.    Other orders  -     empagliflozin (JARDIANCE) 10 mg tablet; Take 1 tablet (10 mg total) by mouth once daily.         Follow up in about 6 months (around 5/20/2024) for HRA with [facility] Sissle or [facility].

## 2023-11-21 ENCOUNTER — TELEPHONE (OUTPATIENT)
Dept: FAMILY MEDICINE | Facility: CLINIC | Age: 73
End: 2023-11-21

## 2023-11-21 NOTE — TELEPHONE ENCOUNTER
----- Message from Obie Argueta MD sent at 11/20/2023 11:07 AM CST -----  Lab reviewed: all OK. Please notify pt. Continue pres meds.

## 2023-11-24 ENCOUNTER — PATIENT OUTREACH (OUTPATIENT)
Dept: ADMINISTRATIVE | Facility: HOSPITAL | Age: 73
End: 2023-11-24
Payer: MEDICARE

## 2023-11-24 NOTE — PROGRESS NOTES
Population Health Chart Review & Patient Outreach Details    Outreach Performed: NO    Additional Pop Health Notes:           Updates Requested / Reviewed:      Updated Care Coordination Note and Care Team Updated         Health Maintenance Topics Overdue:    Health Maintenance Due   Topic Date Due    Shingles Vaccine (1 of 2) Never done    RSV Vaccine (Age 60+ and Pregnant patients) (1 - 1-dose 60+ series) Never done    Eye Exam  04/13/2023    COVID-19 Vaccine (4 - 2023-24 season) 09/01/2023         Health Maintenance Topic(s) Outreach Outcomes & Actions Taken:    Eye Exam - Outreach Outcomes & Actions Taken  : Diabetic Eye External Records Uploaded, Care Team & History Updated if Applicable

## 2023-11-28 ENCOUNTER — TELEPHONE (OUTPATIENT)
Dept: FAMILY MEDICINE | Facility: CLINIC | Age: 73
End: 2023-11-28

## 2024-01-23 ENCOUNTER — OFFICE VISIT (OUTPATIENT)
Dept: OPHTHALMOLOGY | Facility: CLINIC | Age: 74
End: 2024-01-23
Payer: MEDICARE

## 2024-01-23 DIAGNOSIS — H25.13 NUCLEAR SCLEROTIC CATARACT, BILATERAL: Primary | ICD-10-CM

## 2024-01-23 DIAGNOSIS — H40.013 OAG (OPEN ANGLE GLAUCOMA) SUSPECT, LOW RISK, BILATERAL: ICD-10-CM

## 2024-01-23 DIAGNOSIS — H43.813 POSTERIOR VITREOUS DETACHMENT OF BOTH EYES: ICD-10-CM

## 2024-01-23 DIAGNOSIS — E11.9 DIABETES MELLITUS TYPE 2 WITHOUT RETINOPATHY: ICD-10-CM

## 2024-01-23 PROCEDURE — 1101F PT FALLS ASSESS-DOCD LE1/YR: CPT | Mod: CPTII,S$GLB,, | Performed by: OPHTHALMOLOGY

## 2024-01-23 PROCEDURE — 1159F MED LIST DOCD IN RCRD: CPT | Mod: CPTII,S$GLB,, | Performed by: OPHTHALMOLOGY

## 2024-01-23 PROCEDURE — 92136 OPHTHALMIC BIOMETRY: CPT | Mod: LT,S$GLB,, | Performed by: OPHTHALMOLOGY

## 2024-01-23 PROCEDURE — 99204 OFFICE O/P NEW MOD 45 MIN: CPT | Mod: S$GLB,,, | Performed by: OPHTHALMOLOGY

## 2024-01-23 PROCEDURE — 3288F FALL RISK ASSESSMENT DOCD: CPT | Mod: CPTII,S$GLB,, | Performed by: OPHTHALMOLOGY

## 2024-01-23 PROCEDURE — 1126F AMNT PAIN NOTED NONE PRSNT: CPT | Mod: CPTII,S$GLB,, | Performed by: OPHTHALMOLOGY

## 2024-01-23 PROCEDURE — 1160F RVW MEDS BY RX/DR IN RCRD: CPT | Mod: CPTII,S$GLB,, | Performed by: OPHTHALMOLOGY

## 2024-01-23 PROCEDURE — 99999 PR PBB SHADOW E&M-EST. PATIENT-LVL III: CPT | Mod: PBBFAC,,, | Performed by: OPHTHALMOLOGY

## 2024-01-23 PROCEDURE — 92133 CPTRZD OPH DX IMG PST SGM ON: CPT | Mod: S$GLB,,, | Performed by: OPHTHALMOLOGY

## 2024-01-23 PROCEDURE — 2023F DILAT RTA XM W/O RTNOPTHY: CPT | Mod: CPTII,S$GLB,, | Performed by: OPHTHALMOLOGY

## 2024-01-23 RX ORDER — KETOROLAC TROMETHAMINE 5 MG/ML
1 SOLUTION OPHTHALMIC 4 TIMES DAILY
Qty: 5 ML | Refills: 2 | Status: SHIPPED | OUTPATIENT
Start: 2024-01-23 | End: 2024-04-18 | Stop reason: ALTCHOICE

## 2024-01-23 RX ORDER — SODIUM CHLORIDE 9 MG/ML
INJECTION, SOLUTION INTRAVENOUS CONTINUOUS
Status: CANCELLED | OUTPATIENT
Start: 2024-01-23

## 2024-01-23 RX ORDER — TROPICAMIDE 10 MG/ML
1 SOLUTION/ DROPS OPHTHALMIC
Status: CANCELLED | OUTPATIENT
Start: 2024-01-23

## 2024-01-23 RX ORDER — PHENYLEPHRINE HYDROCHLORIDE 25 MG/ML
1 SOLUTION/ DROPS OPHTHALMIC
Status: CANCELLED | OUTPATIENT
Start: 2024-01-23

## 2024-01-23 RX ORDER — PROPARACAINE HYDROCHLORIDE 5 MG/ML
1 SOLUTION/ DROPS OPHTHALMIC
Status: CANCELLED | OUTPATIENT
Start: 2024-01-23

## 2024-01-23 RX ORDER — MOXIFLOXACIN 5 MG/ML
1 SOLUTION/ DROPS OPHTHALMIC 4 TIMES DAILY
Qty: 3 ML | Refills: 1 | Status: SHIPPED | OUTPATIENT
Start: 2024-01-23 | End: 2024-02-19

## 2024-01-23 RX ORDER — PREDNISOLONE ACETATE 10 MG/ML
1 SUSPENSION/ DROPS OPHTHALMIC 4 TIMES DAILY
Qty: 5 ML | Refills: 2 | Status: SHIPPED | OUTPATIENT
Start: 2024-01-23 | End: 2024-04-18 | Stop reason: ALTCHOICE

## 2024-01-23 NOTE — PROGRESS NOTES
HPI    New pt presents for cataract eval OU and DM exam     Complains of blurred distance and near vision and trouble driving at night   due to bright lights. States DM is stable     No eye meds     No eye sx hx     Hemoglobin A1C       Date                     Value               Ref Range             Status                11/20/2023               7.2 (H)             4.5 - 6.2 %           Final                      05/18/2023               7.0 (H)             4.5 - 6.2 %           Final                          02/15/2023               7.5 (H)             4.5 - 6.2 %           Final                Last edited by Leidy Nam on 1/23/2024  2:12 PM.            Assessment /Plan     For exam results, see Encounter Report.    Nuclear sclerotic cataract, bilateral    Posterior vitreous detachment of both eyes    OAG (open angle glaucoma) suspect, low risk, bilateral    Diabetes mellitus type 2 without retinopathy      1. Nuclear sclerotic cataract, bilateral  OS>OD  Patient with visually significant cataract impacting abiliity ADLs (reading, driving, PM driving, glare).  Discussed options, R & B, expectations, patient voices good understanding and wishes to proceed with procedure. Patient will likely benefit from sx and signed consent.    Proceed with CEIOL OS then OD  Monofocal dist IOL    Small macular scar vs dystrophy OD, reviewed with patient, should not affect cataract sx    2. Posterior vitreous detachment of both eyes  RD precautions reviewed    3. OAG (open angle glaucoma) suspect, low risk, bilateral  Neg famhx  ?pachy    ONHs suspicious  IOP normal  OCT NFL borderline    Recommend w/u post CEIOLs    4. DMsDR  Diabetes without retinopathy, discussed with patient importance of glucose control and follow up.  Patient voices understanding.

## 2024-02-06 ENCOUNTER — ANESTHESIA EVENT (OUTPATIENT)
Dept: SURGERY | Facility: HOSPITAL | Age: 74
End: 2024-02-06
Payer: MEDICARE

## 2024-02-06 DIAGNOSIS — F33.42 RECURRENT MAJOR DEPRESSIVE DISORDER, IN FULL REMISSION: ICD-10-CM

## 2024-02-06 RX ORDER — SERTRALINE HYDROCHLORIDE 50 MG/1
TABLET, FILM COATED ORAL
Qty: 180 TABLET | Refills: 1 | Status: SHIPPED | OUTPATIENT
Start: 2024-02-06

## 2024-02-07 ENCOUNTER — ANESTHESIA (OUTPATIENT)
Dept: SURGERY | Facility: HOSPITAL | Age: 74
End: 2024-02-07
Payer: MEDICARE

## 2024-02-07 ENCOUNTER — HOSPITAL ENCOUNTER (OUTPATIENT)
Facility: HOSPITAL | Age: 74
Discharge: HOME | End: 2024-02-07
Attending: OPHTHALMOLOGY | Admitting: OPHTHALMOLOGY
Payer: MEDICARE

## 2024-02-07 DIAGNOSIS — H25.13 NUCLEAR SCLEROTIC CATARACT, BILATERAL: ICD-10-CM

## 2024-02-07 LAB — POCT GLUCOSE: 141 MG/DL (ref 70–110)

## 2024-02-07 PROCEDURE — 71000033 HC RECOVERY, INTIAL HOUR: Performed by: OPHTHALMOLOGY

## 2024-02-07 PROCEDURE — 66984 XCAPSL CTRC RMVL W/O ECP: CPT | Mod: LT,,, | Performed by: OPHTHALMOLOGY

## 2024-02-07 PROCEDURE — 37000008 HC ANESTHESIA 1ST 15 MINUTES: Performed by: OPHTHALMOLOGY

## 2024-02-07 PROCEDURE — 63600175 PHARM REV CODE 636 W HCPCS: Performed by: OPHTHALMOLOGY

## 2024-02-07 PROCEDURE — V2632 POST CHMBR INTRAOCULAR LENS: HCPCS | Performed by: OPHTHALMOLOGY

## 2024-02-07 PROCEDURE — 37000009 HC ANESTHESIA EA ADD 15 MINS: Performed by: OPHTHALMOLOGY

## 2024-02-07 PROCEDURE — 25000003 PHARM REV CODE 250: Performed by: OPHTHALMOLOGY

## 2024-02-07 PROCEDURE — 25000003 PHARM REV CODE 250: Performed by: ANESTHESIOLOGY

## 2024-02-07 PROCEDURE — 63600175 PHARM REV CODE 636 W HCPCS: Performed by: ANESTHESIOLOGY

## 2024-02-07 PROCEDURE — 36000706: Performed by: OPHTHALMOLOGY

## 2024-02-07 PROCEDURE — 63600175 PHARM REV CODE 636 W HCPCS: Performed by: NURSE ANESTHETIST, CERTIFIED REGISTERED

## 2024-02-07 PROCEDURE — 36000707: Performed by: OPHTHALMOLOGY

## 2024-02-07 DEVICE — TECNIS SMPLCTY TECNIS 1PC CLR MONO 22.5D
Type: IMPLANTABLE DEVICE | Site: EYE | Status: FUNCTIONAL
Brand: TECNIS SIMPLICITY

## 2024-02-07 RX ORDER — MOXIFLOXACIN 5 MG/ML
SOLUTION/ DROPS OPHTHALMIC
Status: DISCONTINUED | OUTPATIENT
Start: 2024-02-07 | End: 2024-02-07 | Stop reason: HOSPADM

## 2024-02-07 RX ORDER — LIDOCAINE HYDROCHLORIDE 10 MG/ML
1 INJECTION, SOLUTION EPIDURAL; INFILTRATION; INTRACAUDAL; PERINEURAL ONCE
Status: COMPLETED | OUTPATIENT
Start: 2024-02-07 | End: 2024-02-07

## 2024-02-07 RX ORDER — ONDANSETRON HYDROCHLORIDE 2 MG/ML
INJECTION, SOLUTION INTRAVENOUS
Status: DISCONTINUED | OUTPATIENT
Start: 2024-02-07 | End: 2024-02-07

## 2024-02-07 RX ORDER — LIDOCAINE HYDROCHLORIDE 40 MG/ML
INJECTION, SOLUTION RETROBULBAR
Status: DISCONTINUED | OUTPATIENT
Start: 2024-02-07 | End: 2024-02-07 | Stop reason: HOSPADM

## 2024-02-07 RX ORDER — PROPARACAINE HYDROCHLORIDE 5 MG/ML
1 SOLUTION/ DROPS OPHTHALMIC
Status: DISCONTINUED | OUTPATIENT
Start: 2024-02-07 | End: 2024-02-07 | Stop reason: HOSPADM

## 2024-02-07 RX ORDER — SODIUM CHLORIDE 9 MG/ML
INJECTION, SOLUTION INTRAVENOUS CONTINUOUS
Status: DISCONTINUED | OUTPATIENT
Start: 2024-02-07 | End: 2024-02-07 | Stop reason: HOSPADM

## 2024-02-07 RX ORDER — TROPICAMIDE 10 MG/ML
1 SOLUTION/ DROPS OPHTHALMIC
Status: DISCONTINUED | OUTPATIENT
Start: 2024-02-07 | End: 2024-02-07 | Stop reason: HOSPADM

## 2024-02-07 RX ORDER — TETRACAINE HYDROCHLORIDE 5 MG/ML
SOLUTION OPHTHALMIC
Status: DISCONTINUED | OUTPATIENT
Start: 2024-02-07 | End: 2024-02-07 | Stop reason: HOSPADM

## 2024-02-07 RX ORDER — PHENYLEPHRINE HYDROCHLORIDE 25 MG/ML
1 SOLUTION/ DROPS OPHTHALMIC
Status: DISCONTINUED | OUTPATIENT
Start: 2024-02-07 | End: 2024-02-07 | Stop reason: HOSPADM

## 2024-02-07 RX ORDER — SODIUM CHLORIDE, SODIUM LACTATE, POTASSIUM CHLORIDE, CALCIUM CHLORIDE 600; 310; 30; 20 MG/100ML; MG/100ML; MG/100ML; MG/100ML
INJECTION, SOLUTION INTRAVENOUS CONTINUOUS
Status: DISCONTINUED | OUTPATIENT
Start: 2024-02-07 | End: 2024-02-07 | Stop reason: HOSPADM

## 2024-02-07 RX ORDER — EPINEPHRINE 1 MG/ML
INJECTION, SOLUTION, CONCENTRATE INTRAVENOUS
Status: DISCONTINUED | OUTPATIENT
Start: 2024-02-07 | End: 2024-02-07 | Stop reason: HOSPADM

## 2024-02-07 RX ORDER — MIDAZOLAM HYDROCHLORIDE 1 MG/ML
INJECTION INTRAMUSCULAR; INTRAVENOUS
Status: DISCONTINUED | OUTPATIENT
Start: 2024-02-07 | End: 2024-02-07

## 2024-02-07 RX ADMIN — TROPICAMIDE 1 DROP: 10 SOLUTION/ DROPS OPHTHALMIC at 09:02

## 2024-02-07 RX ADMIN — PROPARACAINE HYDROCHLORIDE 1 DROP: 5 SOLUTION/ DROPS OPHTHALMIC at 09:02

## 2024-02-07 RX ADMIN — SODIUM CHLORIDE, POTASSIUM CHLORIDE, SODIUM LACTATE AND CALCIUM CHLORIDE: 600; 310; 30; 20 INJECTION, SOLUTION INTRAVENOUS at 09:02

## 2024-02-07 RX ADMIN — PHENYLEPHRINE HYDROCHLORIDE 1 DROP: 25 SOLUTION/ DROPS OPHTHALMIC at 09:02

## 2024-02-07 RX ADMIN — MIDAZOLAM HYDROCHLORIDE 2 MG: 1 INJECTION, SOLUTION INTRAMUSCULAR; INTRAVENOUS at 10:02

## 2024-02-07 RX ADMIN — LIDOCAINE HYDROCHLORIDE 0.2 ML: 10 INJECTION, SOLUTION EPIDURAL; INFILTRATION; INTRACAUDAL; PERINEURAL at 09:02

## 2024-02-07 RX ADMIN — ONDANSETRON 4 MG: 2 INJECTION, SOLUTION INTRAMUSCULAR; INTRAVENOUS at 10:02

## 2024-02-07 NOTE — OP NOTE
Operative Date:  02/07/2024    Discharge Date:  02/07/2024    Report Title: Operative Note    SURGEON: Donato Godoy MD    ASSISTANT: None    PREOPERATIVE DIAGNOSIS: Age-related nuclear cataract,  Left Eye    POSTOPERATIVE DIAGNOSIS: same    PROCEDURE PERFORMED: Phacoemulsification of the cataract with posterior chamber intraocular lens Left Eye    IMPLANTS: DCBOO 22.5    ANESTHESIA:  Topical with MAC    COMPLICATIONS: None    ESTIMATED BLOOD LOSS: Minimal    PROCEDURE: The patient was brought to the operating room, time out was performed and implant checked.  The patient was given light sedation, and topical anesthesia was instilled in the left eye.  The left eye was prepped and draped in the usual fashion for eye surgery and lid speculum used to retract the eyelid. The eyelashes were secluded within the drape.  A paracentesis was made inferiorly with a sideport blade. Epishugarcaine was injected in the anterior chamber and dispersive viscoelastic was injected into the anterior chamber. A temporal corneal incision was made with a steel keratome. A cystitome was used to initiate a continuous curvilinear capsulorrhexis and completed using the capsulorrhexis forceps. Hydrodissection of the lens nucleus was performed using balanced salt solution (BSS) on hydrodissection cannula. The lens nucleus was removed using phacoemulsification in the modified stop and chop technique. The lens cortex was removed using the irrigation/aspiration handpiece. The capsular bag was filled with viscoelastic, and the intraocular lens was injected into the capsular bag under direct visualization. Viscoelastic was removed using the irrigation/aspiration handpiece. The wounds were hydrated until watertight.    The wounds were rechecked and no leakage was noted.  The speculum was removed. Topical antibiotic was applied to the eye and shield was placed over the eye. The patient tolerated the procedure well and left the operating room in good  condition.

## 2024-02-07 NOTE — DISCHARGE SUMMARY
[facility] - Periop Services  Discharge Note  Short Stay    Procedure(s) (LRB):  CEIOL OS (Left)      OUTCOME: Patient tolerated treatment/procedure well without complication and is now ready for discharge.    DISPOSITION: Home or Self Care    FINAL DIAGNOSIS:  cataract    FOLLOWUP: In clinic    DISCHARGE INSTRUCTIONS:  No discharge procedures on file.     TIME SPENT ON DISCHARGE: 5 minutes

## 2024-02-07 NOTE — H&P
History    Chief complaint:  Painless progressive vision loss left Eye    Present Ilness/Diagnosis: Visually significant cataract, left Eye    ROS: +Eyes, otherwise no significant changes    Past Medical History: refer to chart    Family History/Social History: refer to chart    Allergies:   Review of patient's allergies indicates:  No Known Allergies    Current Medications: see medcard      Physical Exam    BP: Vital signs stable  General: No apparent distress  HEENT: cataract  Lungs: adequate respirations  Heart: + pulses  Abdomen: soft  Rectal/pelvic: deferred    Labs: Labs Reviewed    Lab Results   Component Value Date    WBC 8.4 08/24/2022    HGB 15.4 08/24/2022    HCT 46.9 08/24/2022    MCV 92.3 08/24/2022     08/24/2022           CMP  Sodium   Date Value Ref Range Status   11/20/2023 135 (L) 136 - 145 mmol/L Final     Potassium   Date Value Ref Range Status   11/20/2023 4.1 3.5 - 5.1 mmol/L Final     Chloride   Date Value Ref Range Status   11/20/2023 102 95 - 110 mmol/L Final     CO2   Date Value Ref Range Status   11/20/2023 27 23 - 29 mmol/L Final     Glucose   Date Value Ref Range Status   11/20/2023 130 (H) 70 - 110 mg/dL Final     BUN   Date Value Ref Range Status   11/20/2023 24 (H) 8 - 23 mg/dL Final     Creatinine   Date Value Ref Range Status   11/20/2023 1.3 0.5 - 1.4 mg/dL Final     Calcium   Date Value Ref Range Status   11/20/2023 9.4 8.7 - 10.5 mg/dL Final     Total Protein   Date Value Ref Range Status   11/20/2023 7.2 6.0 - 8.4 g/dL Final     Albumin   Date Value Ref Range Status   11/20/2023 4.3 3.5 - 5.2 g/dL Final     Total Bilirubin   Date Value Ref Range Status   11/20/2023 0.5 0.1 - 1.0 mg/dL Final     Comment:     For infants and newborns, interpretation of results should be based  on gestational age, weight and in agreement with clinical  observations.    Premature Infant recommended reference ranges:  Up to 24 hours.............<8.0 mg/dL  Up to 48 hours............<12.0  mg/dL  3-5 days..................<15.0 mg/dL  6-29 days.................<15.0 mg/dL       Alkaline Phosphatase   Date Value Ref Range Status   11/20/2023 45 (L) 55 - 135 U/L Final     AST   Date Value Ref Range Status   11/20/2023 23 10 - 40 U/L Final     ALT   Date Value Ref Range Status   11/20/2023 32 10 - 44 U/L Final     Anion Gap   Date Value Ref Range Status   11/20/2023 6 (L) 8 - 16 mmol/L Final     eGFR   Date Value Ref Range Status   11/20/2023 58.0 (A) >60 mL/min/1.73 m^2 Final   08/24/2022 61 > OR = 60 mL/min/1.73m2 Final     Comment:     The eGFR is based on the CKD-EPI 2021 equation. To calculate   the new eGFR from a previous Creatinine or Cystatin C  result, go to https://www.kidney.org/professionals/  kdoqi/gfr%5Fcalculator         The patient has been cleared for surgery in an ambulatory surgery facility.     Impression: Visually significant Cataract left Eye    Plan: Phacoemulsification with implantation of Intraocular lens left Eye

## 2024-02-07 NOTE — ANESTHESIA PREPROCEDURE EVALUATION
02/07/2024  Ciaran Canchola is a 73 y.o., male.      Pre-op Assessment    I have reviewed the Patient Summary Reports.     I have reviewed the Nursing Notes. I have reviewed the NPO Status.   I have reviewed the Medications.     Review of Systems  Anesthesia Hx:             Denies Family Hx of Anesthesia complications.    Denies Personal Hx of Anesthesia complications.                    Cardiovascular:     Hypertension, well controlled           hyperlipidemia                             Pulmonary:         Probable DEEJAY               Endocrine:  Diabetes, type 2         Morbid Obesity / BMI > 40  Psych:    depression                Physical Exam  General: Cooperative, Alert and Oriented    Airway:  Mallampati: III   Mouth Opening: Normal  TM Distance: Normal  Tongue: Normal  Neck: Girth Increased    Chest/Lungs:  Normal Respiratory Rate        Anesthesia Plan  Type of Anesthesia, risks & benefits discussed:    Anesthesia Type: MAC  Intra-op Monitoring Plan: Standard ASA Monitors  Post Op Pain Control Plan: multimodal analgesia  Informed Consent: Informed consent signed with the Patient and all parties understand the risks and agree with anesthesia plan.  All questions answered.   ASA Score: 3    Ready For Surgery From Anesthesia Perspective.     .

## 2024-02-07 NOTE — TRANSFER OF CARE
"Anesthesia Transfer of Care Note    Patient: Ciaran Canchola    Procedure(s) Performed: Procedure(s) (LRB):  CEIOL OS (Left)    Patient location: PACU    Anesthesia Type: general    Transport from OR: Transported from OR on room air with adequate spontaneous ventilation    Post pain: adequate analgesia    Post assessment: no apparent anesthetic complications and tolerated procedure well    Post vital signs: stable    Level of consciousness: awake    Nausea/Vomiting: no nausea/vomiting    Complications: none    Transfer of care protocol was followed      Last vitals: Visit Vitals  /60   Pulse (!) 59   Temp 36.5 °C (97.7 °F) (Skin)   Resp 18   Ht 5' 9" (1.753 m)   Wt 121.1 kg (266 lb 15.6 oz)   SpO2 97%   BMI 39.43 kg/m²     "

## 2024-02-08 ENCOUNTER — OFFICE VISIT (OUTPATIENT)
Dept: OPHTHALMOLOGY | Facility: CLINIC | Age: 74
End: 2024-02-08
Payer: MEDICARE

## 2024-02-08 VITALS
DIASTOLIC BLOOD PRESSURE: 84 MMHG | WEIGHT: 267 LBS | TEMPERATURE: 98 F | SYSTOLIC BLOOD PRESSURE: 124 MMHG | RESPIRATION RATE: 18 BRPM | HEIGHT: 69 IN | OXYGEN SATURATION: 99 % | HEART RATE: 54 BPM | BODY MASS INDEX: 39.55 KG/M2

## 2024-02-08 DIAGNOSIS — Z98.42 STATUS POST CATARACT SURGERY, LEFT: Primary | ICD-10-CM

## 2024-02-08 PROCEDURE — 99024 POSTOP FOLLOW-UP VISIT: CPT | Mod: S$GLB,,, | Performed by: OPHTHALMOLOGY

## 2024-02-08 PROCEDURE — 1160F RVW MEDS BY RX/DR IN RCRD: CPT | Mod: CPTII,S$GLB,, | Performed by: OPHTHALMOLOGY

## 2024-02-08 PROCEDURE — 1126F AMNT PAIN NOTED NONE PRSNT: CPT | Mod: CPTII,S$GLB,, | Performed by: OPHTHALMOLOGY

## 2024-02-08 PROCEDURE — 99999 PR PBB SHADOW E&M-EST. PATIENT-LVL III: CPT | Mod: PBBFAC,,, | Performed by: OPHTHALMOLOGY

## 2024-02-08 PROCEDURE — 1101F PT FALLS ASSESS-DOCD LE1/YR: CPT | Mod: CPTII,S$GLB,, | Performed by: OPHTHALMOLOGY

## 2024-02-08 PROCEDURE — 3288F FALL RISK ASSESSMENT DOCD: CPT | Mod: CPTII,S$GLB,, | Performed by: OPHTHALMOLOGY

## 2024-02-08 PROCEDURE — 1159F MED LIST DOCD IN RCRD: CPT | Mod: CPTII,S$GLB,, | Performed by: OPHTHALMOLOGY

## 2024-02-08 NOTE — ANESTHESIA POSTPROCEDURE EVALUATION
Anesthesia Post Evaluation    Patient: Ciaran Canchola    Procedure(s) Performed: Procedure(s) (LRB):  CEIOL OS (Left)    Final Anesthesia Type: MAC      Patient location during evaluation: PACU  Patient participation: Yes- Able to Participate  Level of consciousness: awake and alert  Post-procedure vital signs: reviewed and stable  Pain management: adequate  Airway patency: patent    PONV status at discharge: No PONV  Anesthetic complications: no      Cardiovascular status: blood pressure returned to baseline  Respiratory status: unassisted  Hydration status: euvolemic  Follow-up not needed.              Vitals Value Taken Time   /84 02/07/24 1110   Temp   02/07/24 2101   Pulse 54 02/07/24 1110   Resp 18 02/07/24 1110   SpO2 99 % 02/07/24 1110         Event Time   Out of Recovery 11:22:00         Pain/Zoe Score: Zoe Score: 10 (2/7/2024 11:22 AM)

## 2024-02-08 NOTE — PROGRESS NOTES
HPI    Pt presents for one day post op PCIOL OS     States OS is doing well     Moxi QID   PF QID   Keto QID     Last edited by Leidy Nam on 2/8/2024  9:03 AM.            Assessment /Plan     For exam results, see Encounter Report.    Status post cataract surgery, left      Doing well  Post op precautions and instructions reviewed, sheet given  moxi QID  PF QID  Keto qdaily    F/u 1 week, brief/auto refract OS, PW for OD

## 2024-02-19 ENCOUNTER — OFFICE VISIT (OUTPATIENT)
Dept: OPHTHALMOLOGY | Facility: CLINIC | Age: 74
End: 2024-02-19
Payer: MEDICARE

## 2024-02-19 DIAGNOSIS — Z98.42 STATUS POST CATARACT SURGERY, LEFT: Primary | ICD-10-CM

## 2024-02-19 DIAGNOSIS — H25.11 AGE-RELATED NUCLEAR CATARACT, RIGHT: ICD-10-CM

## 2024-02-19 PROCEDURE — 99024 POSTOP FOLLOW-UP VISIT: CPT | Mod: S$GLB,,, | Performed by: OPHTHALMOLOGY

## 2024-02-19 PROCEDURE — 99999 PR PBB SHADOW E&M-EST. PATIENT-LVL III: CPT | Mod: PBBFAC,,, | Performed by: OPHTHALMOLOGY

## 2024-02-19 PROCEDURE — 1101F PT FALLS ASSESS-DOCD LE1/YR: CPT | Mod: CPTII,S$GLB,, | Performed by: OPHTHALMOLOGY

## 2024-02-19 PROCEDURE — 1159F MED LIST DOCD IN RCRD: CPT | Mod: CPTII,S$GLB,, | Performed by: OPHTHALMOLOGY

## 2024-02-19 PROCEDURE — 92136 OPHTHALMIC BIOMETRY: CPT | Mod: 26,RT,S$GLB, | Performed by: OPHTHALMOLOGY

## 2024-02-19 PROCEDURE — 3288F FALL RISK ASSESSMENT DOCD: CPT | Mod: CPTII,S$GLB,, | Performed by: OPHTHALMOLOGY

## 2024-02-19 PROCEDURE — 1126F AMNT PAIN NOTED NONE PRSNT: CPT | Mod: CPTII,S$GLB,, | Performed by: OPHTHALMOLOGY

## 2024-02-19 RX ORDER — PHENYLEPHRINE HYDROCHLORIDE 100 MG/ML
1 SOLUTION/ DROPS OPHTHALMIC
Status: CANCELLED | OUTPATIENT
Start: 2024-02-19

## 2024-02-19 RX ORDER — MOXIFLOXACIN 5 MG/ML
1 SOLUTION/ DROPS OPHTHALMIC 4 TIMES DAILY
Qty: 3 ML | Refills: 0 | Status: SHIPPED | OUTPATIENT
Start: 2024-02-19 | End: 2024-03-02

## 2024-02-19 RX ORDER — TROPICAMIDE 10 MG/ML
1 SOLUTION/ DROPS OPHTHALMIC
Status: CANCELLED | OUTPATIENT
Start: 2024-02-19

## 2024-02-19 RX ORDER — SODIUM CHLORIDE 9 MG/ML
INJECTION, SOLUTION INTRAVENOUS CONTINUOUS
Status: CANCELLED | OUTPATIENT
Start: 2024-02-19

## 2024-02-19 RX ORDER — PHENYLEPHRINE HYDROCHLORIDE 25 MG/ML
1 SOLUTION/ DROPS OPHTHALMIC
Status: CANCELLED | OUTPATIENT
Start: 2024-02-19

## 2024-02-19 RX ORDER — PROPARACAINE HYDROCHLORIDE 5 MG/ML
1 SOLUTION/ DROPS OPHTHALMIC
Status: CANCELLED | OUTPATIENT
Start: 2024-02-19

## 2024-02-19 NOTE — PROGRESS NOTES
HPI    Pt presents for one week post op PCIOL OS and pre op OD     States OS is doing well, vision has improved and colors are brighter     PF BID  Keto qdaily    Last edited by Leidy Nam on 2/19/2024  8:43 AM.            Assessment /Plan     For exam results, see Encounter Report.    Status post cataract surgery, left    Age-related nuclear cataract, right      1. Status post cataract surgery, left  POW1 CEIOL  Doing well  D/c moxi  Continue PF QID with taper  Continue keto qdaily  Post op instructions reviewed    2. Age-related nuclear cataract, right  Patient with visually significant cataract impacting abiliity ADLs (reading, driving, PM driving, glare).  Discussed options, R & B, expectations, patient voices good understanding and wishes to proceed with procedure. Patient will likely benefit from sx and signed consent.    Proceed with CEIOL OD  Monofocal dist IOL

## 2024-03-11 NOTE — DISCHARGE INSTRUCTIONS
Before leaving, please make sure you have all your personal belongings such as glasses, purses, wallets, keys, cell phones, jewelry, jackets etc     Discharge Instructions for Cataract Surgery    USE DROPS AS INSTRUCTED:     PREDNISOLONE  ONE DROP 4 TIMES A DAY TO RIGHT EYE  Moxifloxacin ONE DROP 4 TIMES A DAY TO RIGHT EYE  KETOROLAC ONE DROP 4 TIMES A DAY  TO RIGHT EYE    WAIT 2 MINUTES BETWEEN DROPS     BRING ALL EYE DROPS TO YOUR CLINIC VISITS    Wear sunglasses during the day  Do not sleep on the affected side and wear eye shield while sleeping for 2 weeks.  No exercise or lifting greater than 10 lbs for 2 weeks.  Try not to cough. If coughing a lot, take a cough suppressent  Do not bend over for 2 weeks.  Keep water it of your eye for 2 weeks.             Wear sunglasses for comfort as needed.  It is normal to feel a slight irritation, like there is an eyelash in the eye that had surgery.   You may take Tylenol for discomfort but if pain intensifies , call Dr Godoy     If you use eye drops for glaucoma you may continue to use them.      After Surgery:  Always be aware that any surgery can cause these symptoms:    Pain- Medication can be prescribed for pain to decrease your pain but may not completely take your pain away.  Over the Counter pain medicine my be enough and you can always use Ice and rest to help ease pain.    Bleeding- a little bleeding after a surgery is usually within normal.  If there is a lot of blood you need to notify your MD.  Emergency treatments of bleeding are cold application, elevation of the bleeding site and compression.    Infection- Infection after surgery is NOT a normal occurrence.  Signs of infection are fever, swelling, hot to touch the incision.  If this occurs notify your MD immediately.    Nausea- this can be common after a surgery especially if you have had anesthesia medicine or are taking pain medicine.  Staying on clear liquids, bland foods, gingerale, or  over the counter anti nausea medicines can help.  If you vomit more than once, notify your MD.  Anti Nausea medicines can be prescribed.

## 2024-03-13 ENCOUNTER — ANESTHESIA (OUTPATIENT)
Dept: SURGERY | Facility: HOSPITAL | Age: 74
End: 2024-03-13
Payer: MEDICARE

## 2024-03-13 ENCOUNTER — HOSPITAL ENCOUNTER (OUTPATIENT)
Facility: HOSPITAL | Age: 74
Discharge: HOME | End: 2024-03-13
Attending: OPHTHALMOLOGY | Admitting: OPHTHALMOLOGY
Payer: MEDICARE

## 2024-03-13 ENCOUNTER — ANESTHESIA EVENT (OUTPATIENT)
Dept: SURGERY | Facility: HOSPITAL | Age: 74
End: 2024-03-13
Payer: MEDICARE

## 2024-03-13 DIAGNOSIS — H25.11 AGE-RELATED NUCLEAR CATARACT, RIGHT: ICD-10-CM

## 2024-03-13 DIAGNOSIS — Z98.42 STATUS POST CATARACT SURGERY, LEFT: ICD-10-CM

## 2024-03-13 LAB — POCT GLUCOSE: 136 MG/DL (ref 70–110)

## 2024-03-13 PROCEDURE — 63600175 PHARM REV CODE 636 W HCPCS: Performed by: NURSE ANESTHETIST, CERTIFIED REGISTERED

## 2024-03-13 PROCEDURE — 63600175 PHARM REV CODE 636 W HCPCS: Performed by: ANESTHESIOLOGY

## 2024-03-13 PROCEDURE — 36000706: Performed by: OPHTHALMOLOGY

## 2024-03-13 PROCEDURE — D9220A PRA ANESTHESIA: Mod: CRNA,,, | Performed by: NURSE ANESTHETIST, CERTIFIED REGISTERED

## 2024-03-13 PROCEDURE — 25000003 PHARM REV CODE 250

## 2024-03-13 PROCEDURE — 66984 XCAPSL CTRC RMVL W/O ECP: CPT | Mod: 79,RT,, | Performed by: OPHTHALMOLOGY

## 2024-03-13 PROCEDURE — 25000003 PHARM REV CODE 250: Performed by: OPHTHALMOLOGY

## 2024-03-13 PROCEDURE — 36000707: Performed by: OPHTHALMOLOGY

## 2024-03-13 PROCEDURE — 37000009 HC ANESTHESIA EA ADD 15 MINS: Performed by: OPHTHALMOLOGY

## 2024-03-13 PROCEDURE — 71000033 HC RECOVERY, INTIAL HOUR: Performed by: OPHTHALMOLOGY

## 2024-03-13 PROCEDURE — 37000008 HC ANESTHESIA 1ST 15 MINUTES: Performed by: OPHTHALMOLOGY

## 2024-03-13 PROCEDURE — D9220A PRA ANESTHESIA: Mod: ANES,,, | Performed by: ANESTHESIOLOGY

## 2024-03-13 PROCEDURE — 63600175 PHARM REV CODE 636 W HCPCS: Performed by: OPHTHALMOLOGY

## 2024-03-13 PROCEDURE — V2632 POST CHMBR INTRAOCULAR LENS: HCPCS | Performed by: OPHTHALMOLOGY

## 2024-03-13 DEVICE — TECNIS SMPLCTY TECNIS 1PC CLR MONO 22.5D
Type: IMPLANTABLE DEVICE | Site: EYE | Status: FUNCTIONAL
Brand: TECNIS SIMPLICITY

## 2024-03-13 RX ORDER — MOXIFLOXACIN 5 MG/ML
SOLUTION/ DROPS OPHTHALMIC
Status: DISCONTINUED | OUTPATIENT
Start: 2024-03-13 | End: 2024-03-13 | Stop reason: HOSPADM

## 2024-03-13 RX ORDER — ONDANSETRON HYDROCHLORIDE 2 MG/ML
INJECTION, SOLUTION INTRAVENOUS
Status: DISCONTINUED | OUTPATIENT
Start: 2024-03-13 | End: 2024-03-13

## 2024-03-13 RX ORDER — LIDOCAINE HYDROCHLORIDE 40 MG/ML
INJECTION, SOLUTION RETROBULBAR
Status: DISCONTINUED | OUTPATIENT
Start: 2024-03-13 | End: 2024-03-13 | Stop reason: HOSPADM

## 2024-03-13 RX ORDER — EPINEPHRINE 1 MG/ML
INJECTION, SOLUTION, CONCENTRATE INTRAVENOUS
Status: DISCONTINUED | OUTPATIENT
Start: 2024-03-13 | End: 2024-03-13 | Stop reason: HOSPADM

## 2024-03-13 RX ORDER — TETRACAINE HYDROCHLORIDE 5 MG/ML
SOLUTION OPHTHALMIC
Status: DISCONTINUED | OUTPATIENT
Start: 2024-03-13 | End: 2024-03-13 | Stop reason: HOSPADM

## 2024-03-13 RX ORDER — PHENYLEPHRINE HYDROCHLORIDE 100 MG/ML
1 SOLUTION/ DROPS OPHTHALMIC
Status: DISCONTINUED | OUTPATIENT
Start: 2024-03-13 | End: 2024-03-13 | Stop reason: HOSPADM

## 2024-03-13 RX ORDER — PHENYLEPHRINE HYDROCHLORIDE 25 MG/ML
1 SOLUTION/ DROPS OPHTHALMIC
Status: DISCONTINUED | OUTPATIENT
Start: 2024-03-13 | End: 2024-03-13 | Stop reason: HOSPADM

## 2024-03-13 RX ORDER — TROPICAMIDE 10 MG/ML
1 SOLUTION/ DROPS OPHTHALMIC
Status: DISCONTINUED | OUTPATIENT
Start: 2024-03-13 | End: 2024-03-13 | Stop reason: HOSPADM

## 2024-03-13 RX ORDER — MIDAZOLAM HYDROCHLORIDE 1 MG/ML
INJECTION INTRAMUSCULAR; INTRAVENOUS
Status: DISCONTINUED | OUTPATIENT
Start: 2024-03-13 | End: 2024-03-13

## 2024-03-13 RX ORDER — SODIUM CHLORIDE 9 MG/ML
INJECTION, SOLUTION INTRAVENOUS CONTINUOUS
Status: DISCONTINUED | OUTPATIENT
Start: 2024-03-13 | End: 2024-03-13 | Stop reason: HOSPADM

## 2024-03-13 RX ORDER — SODIUM CHLORIDE, SODIUM LACTATE, POTASSIUM CHLORIDE, CALCIUM CHLORIDE 600; 310; 30; 20 MG/100ML; MG/100ML; MG/100ML; MG/100ML
INJECTION, SOLUTION INTRAVENOUS CONTINUOUS
Status: DISCONTINUED | OUTPATIENT
Start: 2024-03-13 | End: 2024-03-13 | Stop reason: HOSPADM

## 2024-03-13 RX ORDER — PROPARACAINE HYDROCHLORIDE 5 MG/ML
1 SOLUTION/ DROPS OPHTHALMIC
Status: DISCONTINUED | OUTPATIENT
Start: 2024-03-13 | End: 2024-03-13 | Stop reason: HOSPADM

## 2024-03-13 RX ADMIN — PROPARACAINE HYDROCHLORIDE 1 DROP: 5 SOLUTION/ DROPS OPHTHALMIC at 07:03

## 2024-03-13 RX ADMIN — PHENYLEPHRINE HYDROCHLORIDE 1 DROP: 25 SOLUTION/ DROPS OPHTHALMIC at 07:03

## 2024-03-13 RX ADMIN — TROPICAMIDE 1 DROP: 10 SOLUTION/ DROPS OPHTHALMIC at 07:03

## 2024-03-13 RX ADMIN — ONDANSETRON 4 MG: 2 INJECTION, SOLUTION INTRAMUSCULAR; INTRAVENOUS at 08:03

## 2024-03-13 RX ADMIN — MIDAZOLAM HYDROCHLORIDE 2 MG: 1 INJECTION, SOLUTION INTRAMUSCULAR; INTRAVENOUS at 08:03

## 2024-03-13 RX ADMIN — SODIUM CHLORIDE, POTASSIUM CHLORIDE, SODIUM LACTATE AND CALCIUM CHLORIDE: 600; 310; 30; 20 INJECTION, SOLUTION INTRAVENOUS at 07:03

## 2024-03-13 NOTE — ANESTHESIA PREPROCEDURE EVALUATION
03/13/2024  Ciaran Canchola is a 73 y.o., male.      Pre-op Assessment    I have reviewed the Patient Summary Reports.     I have reviewed the Nursing Notes. I have reviewed the NPO Status.   I have reviewed the Medications.     Review of Systems  Anesthesia Hx:  No problems with previous Anesthesia             Denies Family Hx of Anesthesia complications.    Denies Personal Hx of Anesthesia complications.                    Social:  Former Smoker       Cardiovascular:     Hypertension, well controlled           hyperlipidemia                       Hypertension         Pulmonary:         Probable DEEJAY               Neurological:  Neurology Normal                                      Endocrine:  Diabetes, type 2    Diabetes                    Morbid Obesity / BMI > 40  Psych:  Psychiatric History  depression                Physical Exam  General: Cooperative, Alert and Oriented    Airway:  Mallampati: III   Mouth Opening: Normal  TM Distance: Normal  Tongue: Normal  Neck: Girth Increased    Chest/Lungs:  Normal Respiratory Rate        Anesthesia Plan  Type of Anesthesia, risks & benefits discussed:    Anesthesia Type: MAC  Intra-op Monitoring Plan: Standard ASA Monitors  Post Op Pain Control Plan: multimodal analgesia  Informed Consent: Informed consent signed with the Patient and all parties understand the risks and agree with anesthesia plan.  All questions answered.   ASA Score: 3    Ready For Surgery From Anesthesia Perspective.     .

## 2024-03-13 NOTE — TRANSFER OF CARE
Anesthesia Transfer of Care Note    Patient: [place] Jesika    Procedure(s) Performed: Procedure(s) (LRB):  CEIOL OD (Right)    Patient location: PACU    Anesthesia Type: MAC    Transport from OR: Transported from OR on room air with adequate spontaneous ventilation    Post pain: adequate analgesia    Post assessment: no apparent anesthetic complications and tolerated procedure well    Post vital signs: stable    Level of consciousness: awake, alert and oriented    Nausea/Vomiting: no nausea/vomiting    Complications: none    Transfer of care protocol was followed      Last vitals: Visit Vitals  BP (!) 130/56 (BP Location: Right arm, Patient Position: Lying)   Pulse (!) 54   Temp 36.3 °C (97.3 °F) (Skin)   Resp 18   SpO2 100%

## 2024-03-13 NOTE — OP NOTE
Operative Date:  03/13/2024    Discharge Date:  03/13/2024    Report Title: Operative Note    SURGEON: Donato Godoy MD    ASSISTANT: None    PREOPERATIVE DIAGNOSIS: Age-related nuclear cataract, Right Eye    POSTOPERATIVE DIAGNOSIS: same    PROCEDURE PERFORMED: Phacoemulsification of the cataract with posterior chamber intraocular lens Right Eye    IMPLANTS: DCBOO 22.5    ANESTHESIA:  Topical with MAC    COMPLICATIONS: None    ESTIMATED BLOOD LOSS: Minimal    PROCEDURE: The patient was brought to the operating room, time out was performed and implant checked.  The patient was given light sedation, and topical anesthesia was instilled in the right eye.  The right eye was prepped and draped in the usual fashion for eye surgery and lid speculum used to retract the eyelid. The eyelashes were secluded within the drape.  A paracentesis was made superiorly with a sideport blade. Epishugarcaine was injected in the anterior chamber and dispersive viscoelastic was injected into the anterior chamber. A temporal corneal incision was made with a steel keratome. A cystitome was used to initiate a continuous curvilinear capsulorrhexis and completed using the capsulorrhexis forceps. Hydrodissection of the lens nucleus was performed using balanced salt solution (BSS) on hydrodissection cannula. The lens nucleus was removed using phacoemulsification in the modified stop and chop technique. The lens cortex was removed using the irrigation/aspiration handpiece. The capsular bag was filled with viscoelastic, and the intraocular lens was injected into the capsular bag under direct visualization. Viscoelastic was removed using the irrigation/aspiration handpiece. The wounds were hydrated until watertight.    The wounds were rechecked and no leakage was noted.  The speculum was removed. Topical antibiotic was applied to the eye and shield was placed over the eye. The patient tolerated the procedure well and left the operating room in  good condition.

## 2024-03-13 NOTE — PLAN OF CARE
Patient is awake and alert and says he is ready to go home; his spouse says she is ready to take patient home and she is driving. Patient's vital signs and right eye stable; patient wearing provided sunglasses. Patient denies pain, nausea weakness or dizziness.Patient is able to ambulate to wheelchair for discharge with standby nurse assistance.  All patient belongings have been returned to patient.Patient is in stable condition and being discharged via wheelchair to car his spouse is driving.

## 2024-03-13 NOTE — ANESTHESIA POSTPROCEDURE EVALUATION
Anesthesia Post Evaluation    Patient: Ciaran Canchola    Procedure(s) Performed: Procedure(s) (LRB):  CEIOL OD (Right)    Final Anesthesia Type: MAC      Patient location during evaluation: PACU  Patient participation: Yes- Able to Participate  Level of consciousness: awake and alert  Post-procedure vital signs: reviewed and stable  Pain management: adequate  Airway patency: patent    PONV status at discharge: No PONV  Anesthetic complications: no      Cardiovascular status: blood pressure returned to baseline  Respiratory status: unassisted  Hydration status: euvolemic  Follow-up not needed.              Vitals Value Taken Time   /61 03/13/24 0905   Temp 36.4 °C (97.5 °F) 03/13/24 0845   Pulse 52 03/13/24 0906   Resp 16 03/13/24 0904   SpO2 99 % 03/13/24 0906   Vitals shown include unvalidated device data.      No case tracking events are documented in the log.      Pain/Zoe Score: Zoe Score: 10 (3/13/2024  8:45 AM)  Modified Zoe Score: 19 (3/13/2024  9:00 AM)

## 2024-03-13 NOTE — DISCHARGE SUMMARY
[facility]  Discharge Note  Short Stay    Procedure(s) (LRB):  CEIOL OD (Right)      OUTCOME: Patient tolerated treatment/procedure well without complication and is now ready for discharge.    DISPOSITION: Home or Self Care    FINAL DIAGNOSIS:  cataract    FOLLOWUP: In clinic    DISCHARGE INSTRUCTIONS:  No discharge procedures on file.     TIME SPENT ON DISCHARGE: 5 minutes

## 2024-03-13 NOTE — H&P
History    Chief complaint:  Painless progressive vision loss right Eye    Present Ilness/Diagnosis: Visually significant cataract, right Eye    ROS: +Eyes, otherwise no significant changes    Past Medical History: refer to chart    Family History/Social History: refer to chart    Allergies:   Review of patient's allergies indicates:  No Known Allergies    Current Medications: see medcard      Physical Exam    BP: Vital signs stable  General: No apparent distress  HEENT: cataract  Lungs: adequate respirations  Heart: + pulses  Abdomen: soft  Rectal/pelvic: deferred    Labs: Labs Reviewed    Lab Results   Component Value Date    WBC 8.4 08/24/2022    HGB 15.4 08/24/2022    HCT 46.9 08/24/2022    MCV 92.3 08/24/2022     08/24/2022           CMP  Sodium   Date Value Ref Range Status   11/20/2023 135 (L) 136 - 145 mmol/L Final     Potassium   Date Value Ref Range Status   11/20/2023 4.1 3.5 - 5.1 mmol/L Final     Chloride   Date Value Ref Range Status   11/20/2023 102 95 - 110 mmol/L Final     CO2   Date Value Ref Range Status   11/20/2023 27 23 - 29 mmol/L Final     Glucose   Date Value Ref Range Status   11/20/2023 130 (H) 70 - 110 mg/dL Final     BUN   Date Value Ref Range Status   11/20/2023 24 (H) 8 - 23 mg/dL Final     Creatinine   Date Value Ref Range Status   11/20/2023 1.3 0.5 - 1.4 mg/dL Final     Calcium   Date Value Ref Range Status   11/20/2023 9.4 8.7 - 10.5 mg/dL Final     Total Protein   Date Value Ref Range Status   11/20/2023 7.2 6.0 - 8.4 g/dL Final     Albumin   Date Value Ref Range Status   11/20/2023 4.3 3.5 - 5.2 g/dL Final     Total Bilirubin   Date Value Ref Range Status   11/20/2023 0.5 0.1 - 1.0 mg/dL Final     Comment:     For infants and newborns, interpretation of results should be based  on gestational age, weight and in agreement with clinical  observations.    Premature Infant recommended reference ranges:  Up to 24 hours.............<8.0 mg/dL  Up to 48 hours............<12.0  mg/dL  3-5 days..................<15.0 mg/dL  6-29 days.................<15.0 mg/dL       Alkaline Phosphatase   Date Value Ref Range Status   11/20/2023 45 (L) 55 - 135 U/L Final     AST   Date Value Ref Range Status   11/20/2023 23 10 - 40 U/L Final     ALT   Date Value Ref Range Status   11/20/2023 32 10 - 44 U/L Final     Anion Gap   Date Value Ref Range Status   11/20/2023 6 (L) 8 - 16 mmol/L Final     eGFR   Date Value Ref Range Status   11/20/2023 58.0 (A) >60 mL/min/1.73 m^2 Final   08/24/2022 61 > OR = 60 mL/min/1.73m2 Final     Comment:     The eGFR is based on the CKD-EPI 2021 equation. To calculate   the new eGFR from a previous Creatinine or Cystatin C  result, go to https://www.kidney.org/professionals/  kdoqi/gfr%5Fcalculator         The patient has been cleared for surgery in an ambulatory surgery facility.     Impression: Visually significant Cataract right Eye    Plan: Phacoemulsification with implantation of Intraocular lens right Eye

## 2024-03-14 ENCOUNTER — OFFICE VISIT (OUTPATIENT)
Dept: OPHTHALMOLOGY | Facility: CLINIC | Age: 74
End: 2024-03-14
Payer: MEDICARE

## 2024-03-14 VITALS
DIASTOLIC BLOOD PRESSURE: 61 MMHG | TEMPERATURE: 98 F | RESPIRATION RATE: 16 BRPM | HEART RATE: 55 BPM | OXYGEN SATURATION: 97 % | SYSTOLIC BLOOD PRESSURE: 115 MMHG

## 2024-03-14 DIAGNOSIS — Z98.41 STATUS POST CATARACT SURGERY, RIGHT: Primary | ICD-10-CM

## 2024-03-14 PROCEDURE — 1125F AMNT PAIN NOTED PAIN PRSNT: CPT | Mod: CPTII,S$GLB,, | Performed by: OPHTHALMOLOGY

## 2024-03-14 PROCEDURE — 99024 POSTOP FOLLOW-UP VISIT: CPT | Mod: S$GLB,,, | Performed by: OPHTHALMOLOGY

## 2024-03-14 PROCEDURE — 1160F RVW MEDS BY RX/DR IN RCRD: CPT | Mod: CPTII,S$GLB,, | Performed by: OPHTHALMOLOGY

## 2024-03-14 PROCEDURE — 3288F FALL RISK ASSESSMENT DOCD: CPT | Mod: CPTII,S$GLB,, | Performed by: OPHTHALMOLOGY

## 2024-03-14 PROCEDURE — 1159F MED LIST DOCD IN RCRD: CPT | Mod: CPTII,S$GLB,, | Performed by: OPHTHALMOLOGY

## 2024-03-14 PROCEDURE — 1101F PT FALLS ASSESS-DOCD LE1/YR: CPT | Mod: CPTII,S$GLB,, | Performed by: OPHTHALMOLOGY

## 2024-03-14 PROCEDURE — 99999 PR PBB SHADOW E&M-EST. PATIENT-LVL III: CPT | Mod: PBBFAC,,, | Performed by: OPHTHALMOLOGY

## 2024-03-14 NOTE — PROGRESS NOTES
HPI    Pt presents for one day post op PCIOL OD     States vision is doing well, slight pain, but very minimal.     Moxi QID   PF QID   Keto QID     Last edited by Leidy Nam on 3/14/2024  8:22 AM.            Assessment /Plan     For exam results, see Encounter Report.    Status post cataract surgery, right      Doing well  Post op precautions and instructions reviewed, sheet given  moxi QID  PF QID  Keto qdaily    F/u 1 month, refract PRN

## 2024-03-19 NOTE — TELEPHONE ENCOUNTER
Attempted to reach pt regarding lab results. No answer LVM   The catheter was repositioned into the ostium   left main. An angiography was performed of the left coronary arteries. Multiple views were taken. The angiography was performed via power injection.

## 2024-03-20 DIAGNOSIS — I10 ESSENTIAL HYPERTENSION: ICD-10-CM

## 2024-03-21 RX ORDER — LISINOPRIL AND HYDROCHLOROTHIAZIDE 20; 25 MG/1; MG/1
1 TABLET ORAL DAILY
Qty: 90 TABLET | Refills: 1 | Status: SHIPPED | OUTPATIENT
Start: 2024-03-21

## 2024-04-16 DIAGNOSIS — M25.561 RIGHT KNEE PAIN, UNSPECIFIED CHRONICITY: Primary | ICD-10-CM

## 2024-04-17 ENCOUNTER — OFFICE VISIT (OUTPATIENT)
Dept: ORTHOPEDICS | Facility: CLINIC | Age: 74
End: 2024-04-17
Payer: MEDICARE

## 2024-04-17 ENCOUNTER — HOSPITAL ENCOUNTER (OUTPATIENT)
Dept: RADIOLOGY | Facility: HOSPITAL | Age: 74
Discharge: HOME | End: 2024-04-17
Attending: ORTHOPAEDIC SURGERY
Payer: MEDICARE

## 2024-04-17 VITALS — HEART RATE: 59 BPM | BODY MASS INDEX: 39.55 KG/M2 | OXYGEN SATURATION: 98 % | HEIGHT: 69 IN | WEIGHT: 267 LBS

## 2024-04-17 DIAGNOSIS — M25.561 RIGHT KNEE PAIN, UNSPECIFIED CHRONICITY: ICD-10-CM

## 2024-04-17 DIAGNOSIS — M17.0 PRIMARY OSTEOARTHRITIS OF BOTH KNEES: Primary | ICD-10-CM

## 2024-04-17 PROCEDURE — 1160F RVW MEDS BY RX/DR IN RCRD: CPT | Mod: CPTII,S$GLB,, | Performed by: ORTHOPAEDIC SURGERY

## 2024-04-17 PROCEDURE — 1100F PTFALLS ASSESS-DOCD GE2>/YR: CPT | Mod: CPTII,S$GLB,, | Performed by: ORTHOPAEDIC SURGERY

## 2024-04-17 PROCEDURE — 20610 DRAIN/INJ JOINT/BURSA W/O US: CPT | Mod: RT,S$GLB,, | Performed by: ORTHOPAEDIC SURGERY

## 2024-04-17 PROCEDURE — 1125F AMNT PAIN NOTED PAIN PRSNT: CPT | Mod: CPTII,S$GLB,, | Performed by: ORTHOPAEDIC SURGERY

## 2024-04-17 PROCEDURE — 99204 OFFICE O/P NEW MOD 45 MIN: CPT | Mod: 25,S$GLB,, | Performed by: ORTHOPAEDIC SURGERY

## 2024-04-17 PROCEDURE — 3288F FALL RISK ASSESSMENT DOCD: CPT | Mod: CPTII,S$GLB,, | Performed by: ORTHOPAEDIC SURGERY

## 2024-04-17 PROCEDURE — 1159F MED LIST DOCD IN RCRD: CPT | Mod: CPTII,S$GLB,, | Performed by: ORTHOPAEDIC SURGERY

## 2024-04-17 PROCEDURE — 99999 PR PBB SHADOW E&M-EST. PATIENT-LVL III: CPT | Mod: PBBFAC,,, | Performed by: ORTHOPAEDIC SURGERY

## 2024-04-17 PROCEDURE — 73564 X-RAY EXAM KNEE 4 OR MORE: CPT | Mod: 26,RT,, | Performed by: RADIOLOGY

## 2024-04-17 PROCEDURE — 73562 X-RAY EXAM OF KNEE 3: CPT | Mod: 59,TC,PO,LT

## 2024-04-17 PROCEDURE — 3008F BODY MASS INDEX DOCD: CPT | Mod: CPTII,S$GLB,, | Performed by: ORTHOPAEDIC SURGERY

## 2024-04-17 PROCEDURE — 73562 X-RAY EXAM OF KNEE 3: CPT | Mod: 26,59,LT, | Performed by: RADIOLOGY

## 2024-04-17 PROCEDURE — 4010F ACE/ARB THERAPY RXD/TAKEN: CPT | Mod: CPTII,S$GLB,, | Performed by: ORTHOPAEDIC SURGERY

## 2024-04-17 RX ORDER — TRIAMCINOLONE ACETONIDE 40 MG/ML
40 INJECTION, SUSPENSION INTRA-ARTICULAR; INTRAMUSCULAR
Status: DISCONTINUED | OUTPATIENT
Start: 2024-04-17 | End: 2024-04-17 | Stop reason: HOSPADM

## 2024-04-17 RX ADMIN — TRIAMCINOLONE ACETONIDE 40 MG: 40 INJECTION, SUSPENSION INTRA-ARTICULAR; INTRAMUSCULAR at 10:04

## 2024-04-17 NOTE — PROGRESS NOTES
Subjective:      Patient ID: Ciaran Canchola is a 73 y.o. male.    Chief Complaint: Pain of the Right Knee    HPI  73-year-old male long history of intermittent problems with the right knee.  He states that this stems back to some trauma that he had 50 somewhat years ago while serving in the .  He denies any recent trauma.  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  He is complaining of pain that interferes with activities of daily living at times.  He does take meloxicam.  ROS      Objective:    Ortho Exam     Constitutional:   Patient is alert  and oriented in no acute distress  HEENT:  normocephalic atraumatic; PERRL EOMI  Neck:  Supple without adenopathy  Cardiovascular:  Normal rate and rhythm  Pulmonary:  Normal respiratory effort normal chest wall expansion  Abdominal:  Nonprotuberant nondistended  Musculoskeletal:  Patient has a very minimally antalgic gait  He has diffuse joint line tenderness and crepitus with range of motion of his right knee  He has a trace effusion he is a bit guarded but has no gross instability  Neurological:  No focal defect; cranial nerves 2-12 grossly intact  Psychiatric/behavioral:  Mood and behavior normal      X-ray Knee Ortho Right with Flexion  Narrative: EXAMINATION:  XR KNEE ORTHO RIGHT WITH FLEXION    CLINICAL HISTORY:  Pain in right knee    TECHNIQUE:  AP standing as well as PA flexion standing and Merchant views of both knees were performed.  A lateral view of the right knee is also performed.    COMPARISON:  None.    FINDINGS:  A fracture of the femur, patella, tibia or fibula is not seen.  There is marked narrowing of the lateral intercondylar joint space and pointing of the intercondylar tibial eminences.  Small spur formation is noted at the superior pole of the patella.  There is a small joint effusion.  Impression: Moderate osteoarthritis right knee.    Electronically signed by: Tc Nava  MD  Date:    04/17/2024  Time:    10:18       My Radiographs Findings:    Severe degenerative changes with tricompartmental degenerative changes and bone-on-bone osteoarthritis of his right lateral compartment  Assessment:       Encounter Diagnosis   Name Primary?    Primary osteoarthritis of both knees Yes         Plan:       I have discussed medical condition treatment options with him at length.  He states he would like to consider knee replacement in the future but simply is requesting some short-term pain relief.  After a verbal consent and sterile prep I injected his knee today without complication.  We have discussed continued use of his NSAIDs per his PCP.  We have discussed general knee rehab exercises.  Follow up can be as needed.        Past Medical History:   Diagnosis Date    Depression     Diabetes mellitus     Hyperlipidemia     Hypertensive retinopathy of both eyes 11/20/2023     Past Surgical History:   Procedure Laterality Date    CATARACT EXTRACTION W/  INTRAOCULAR LENS IMPLANT Left 2/7/2024    Procedure: CEIOL OS;  Surgeon: Donato Godoy MD;  Location: [facility] OR;  Service: Ophthalmology;  Laterality: Left;    CATARACT EXTRACTION W/  INTRAOCULAR LENS IMPLANT Right 3/13/2024    Procedure: CEIOL OD;  Surgeon: Donato Godoy MD;  Location: [facility] OR;  Service: Ophthalmology;  Laterality: Right;    CHOLECYSTECTOMY      COLONOSCOPY  2018    RTC in 5 years. done in [place]    TONSILLECTOMY      UMBILICAL HERNIA REPAIR           Current Outpatient Medications:     blood sugar diagnostic Strp, To check BG 2 times daily, to use with insurance preferred meter, Disp: 200 strip, Rfl: 3    cholecalciferol, vitamin D3, (VITAMIN D3) 50 mcg (2,000 unit) Tab, Take 1 tablet (2,000 Units total) by mouth once daily., Disp: 30 tablet, Rfl: 11    empagliflozin (JARDIANCE) 10 mg tablet, Take 1 tablet (10 mg total) by mouth once daily., Disp: 30 tablet, Rfl: 5    glipiZIDE (GLUCOTROL) 10 MG tablet, TAKE 1  TABLET(10 MG) BY MOUTH DAILY WITH BREAKFAST, Disp: 90 tablet, Rfl: 1    ketorolac 0.5% (ACULAR) 0.5 % Drop, Place 1 drop into the left eye 4 (four) times daily. Start 3 days before surgery., Disp: 5 mL, Rfl: 2    lancets Misc, To check BG 2 times daily, to use with insurance preferred meter, Disp: 200 each, Rfl: 3    lisinopriL-hydrochlorothiazide (PRINZIDE,ZESTORETIC) 20-25 mg Tab, Take 1 tablet by mouth once daily., Disp: 90 tablet, Rfl: 1    meloxicam (MOBIC) 15 MG tablet, Take 1 tablet (15 mg total) by mouth once daily., Disp: 90 tablet, Rfl: 1    prednisoLONE acetate (PRED FORTE) 1 % DrpS, Place 1 drop into the left eye 4 (four) times daily. Start after cataract surgery., Disp: 5 mL, Rfl: 2    sertraline (ZOLOFT) 50 MG tablet, TAKE 2 TABLETS(100 MG) BY MOUTH EVERY EVENING, Disp: 180 tablet, Rfl: 1    blood-glucose meter kit, To check BG 2 times daily, to use with insurance preferred meter, Disp: 1 each, Rfl: 0    esomeprazole (NEXIUM) 40 MG capsule, Take 1 capsule (40 mg total) by mouth before breakfast., Disp: 90 capsule, Rfl: 1    rosuvastatin (CRESTOR) 5 MG tablet, Take 1 tablet (5 mg total) by mouth once daily., Disp: 90 tablet, Rfl: 1    Review of patient's allergies indicates:  No Known Allergies    Family History   Problem Relation Name Age of Onset    ALS Mother      Heart disease Father      Heart disease Sister       Social History     Occupational History    Not on file   Tobacco Use    Smoking status: Former     Current packs/day: 0.00     Types: Cigarettes     Quit date:      Years since quittin.3    Smokeless tobacco: Never    Tobacco comments:     Quit  35 years ago   Substance and Sexual Activity    Alcohol use: Yes    Drug use: Never    Sexual activity: Yes     Partners: Female

## 2024-04-17 NOTE — PROCEDURES
Large Joint Aspiration/Injection: R knee    Date/Time: 4/17/2024 10:15 AM    Performed by: Tay Alonso MD  Authorized by: Tay Alonso MD    Consent Done?:  Yes (Verbal)  Indications:  Pain  Site marked: the procedure site was marked    Timeout: prior to procedure the correct patient, procedure, and site was verified    Local anesthetic: Ropivicaine.  Anesthetic total (ml):  3      Details:  Needle Size:  20 G  Approach:  Anterolateral  Location:  Knee  Site:  R knee  Medications:  40 mg triamcinolone acetonide 40 mg/mL  Patient tolerance:  Patient tolerated the procedure well with no immediate complications

## 2024-04-18 ENCOUNTER — OFFICE VISIT (OUTPATIENT)
Dept: OPHTHALMOLOGY | Facility: CLINIC | Age: 74
End: 2024-04-18
Payer: MEDICARE

## 2024-04-18 DIAGNOSIS — Z98.42 STATUS POST CATARACT SURGERY, LEFT: ICD-10-CM

## 2024-04-18 DIAGNOSIS — Z98.41 STATUS POST CATARACT SURGERY, RIGHT: Primary | ICD-10-CM

## 2024-04-18 PROCEDURE — 1160F RVW MEDS BY RX/DR IN RCRD: CPT | Mod: CPTII,S$GLB,, | Performed by: OPHTHALMOLOGY

## 2024-04-18 PROCEDURE — 99999 PR PBB SHADOW E&M-EST. PATIENT-LVL III: CPT | Mod: PBBFAC,,, | Performed by: OPHTHALMOLOGY

## 2024-04-18 PROCEDURE — 1159F MED LIST DOCD IN RCRD: CPT | Mod: CPTII,S$GLB,, | Performed by: OPHTHALMOLOGY

## 2024-04-18 PROCEDURE — 4010F ACE/ARB THERAPY RXD/TAKEN: CPT | Mod: CPTII,S$GLB,, | Performed by: OPHTHALMOLOGY

## 2024-04-18 PROCEDURE — 99024 POSTOP FOLLOW-UP VISIT: CPT | Mod: S$GLB,,, | Performed by: OPHTHALMOLOGY

## 2024-04-18 PROCEDURE — 3288F FALL RISK ASSESSMENT DOCD: CPT | Mod: CPTII,S$GLB,, | Performed by: OPHTHALMOLOGY

## 2024-04-18 PROCEDURE — 1101F PT FALLS ASSESS-DOCD LE1/YR: CPT | Mod: CPTII,S$GLB,, | Performed by: OPHTHALMOLOGY

## 2024-04-18 PROCEDURE — 1126F AMNT PAIN NOTED NONE PRSNT: CPT | Mod: CPTII,S$GLB,, | Performed by: OPHTHALMOLOGY

## 2024-04-18 NOTE — PROGRESS NOTES
HPI    Pt presents for one month post op PCIOL OU     States vision is doing well, no complaints.     No eye meds       Last edited by Leidy Nam on 4/18/2024 10:41 AM.            Assessment /Plan     For exam results, see Encounter Report.    Status post cataract surgery, right    Status post cataract surgery, left      POM2 OS, POM1 OD  Doing great    F/u 1 year, routine exam Dr. Merlos

## 2024-05-06 ENCOUNTER — PATIENT OUTREACH (OUTPATIENT)
Dept: ADMINISTRATIVE | Facility: HOSPITAL | Age: 74
End: 2024-05-06
Payer: MEDICARE

## 2024-05-06 NOTE — PROGRESS NOTES
Population Health Chart Review & Patient Outreach Details      Additional [facility] Notes:               Updates Requested / Reviewed:      Updated Care Coordination Note, Care Everywhere, and Immunizations Reconciliation Completed or Queried: [facility] Topics Overdue:      [facility] Score: 0     Patient is not due for any topics at this time.    Shingles/Zoster Vaccine  RSV Vaccine                  Health Maintenance Topic(s) Outreach Outcomes & Actions Taken:    Medication Adherence / Statins - Outreach Outcomes & Actions Taken  : The refill request was sent to the patient's

## 2024-05-20 ENCOUNTER — OFFICE VISIT (OUTPATIENT)
Dept: FAMILY MEDICINE | Facility: CLINIC | Age: 74
End: 2024-05-20
Payer: MEDICARE

## 2024-05-20 VITALS
BODY MASS INDEX: 37.92 KG/M2 | RESPIRATION RATE: 18 BRPM | WEIGHT: 256 LBS | SYSTOLIC BLOOD PRESSURE: 110 MMHG | TEMPERATURE: 99 F | HEART RATE: 76 BPM | DIASTOLIC BLOOD PRESSURE: 70 MMHG | HEIGHT: 69 IN | OXYGEN SATURATION: 98 %

## 2024-05-20 DIAGNOSIS — Z12.5 ENCOUNTER FOR PROSTATE CANCER SCREENING: ICD-10-CM

## 2024-05-20 DIAGNOSIS — K21.9 GASTROESOPHAGEAL REFLUX DISEASE, UNSPECIFIED WHETHER ESOPHAGITIS PRESENT: ICD-10-CM

## 2024-05-20 DIAGNOSIS — E11.65 TYPE 2 DIABETES MELLITUS WITH HYPERGLYCEMIA, WITHOUT LONG-TERM CURRENT USE OF INSULIN: ICD-10-CM

## 2024-05-20 DIAGNOSIS — E78.2 MIXED HYPERLIPIDEMIA: ICD-10-CM

## 2024-05-20 DIAGNOSIS — I10 ESSENTIAL HYPERTENSION: Primary | ICD-10-CM

## 2024-05-20 DIAGNOSIS — R79.89 LOW TESTOSTERONE: ICD-10-CM

## 2024-05-20 PROCEDURE — 1126F AMNT PAIN NOTED NONE PRSNT: CPT | Mod: CPTII,S$GLB,, | Performed by: FAMILY MEDICINE

## 2024-05-20 PROCEDURE — 3288F FALL RISK ASSESSMENT DOCD: CPT | Mod: CPTII,S$GLB,, | Performed by: FAMILY MEDICINE

## 2024-05-20 PROCEDURE — 99214 OFFICE O/P EST MOD 30 MIN: CPT | Mod: S$GLB,,, | Performed by: FAMILY MEDICINE

## 2024-05-20 PROCEDURE — 4010F ACE/ARB THERAPY RXD/TAKEN: CPT | Mod: CPTII,S$GLB,, | Performed by: FAMILY MEDICINE

## 2024-05-20 PROCEDURE — 99999 PR PBB SHADOW E&M-EST. PATIENT-LVL III: CPT | Mod: PBBFAC,,, | Performed by: FAMILY MEDICINE

## 2024-05-20 PROCEDURE — 3078F DIAST BP <80 MM HG: CPT | Mod: CPTII,S$GLB,, | Performed by: FAMILY MEDICINE

## 2024-05-20 PROCEDURE — 1101F PT FALLS ASSESS-DOCD LE1/YR: CPT | Mod: CPTII,S$GLB,, | Performed by: FAMILY MEDICINE

## 2024-05-20 PROCEDURE — 3008F BODY MASS INDEX DOCD: CPT | Mod: CPTII,S$GLB,, | Performed by: FAMILY MEDICINE

## 2024-05-20 PROCEDURE — 1159F MED LIST DOCD IN RCRD: CPT | Mod: CPTII,S$GLB,, | Performed by: FAMILY MEDICINE

## 2024-05-20 PROCEDURE — 3074F SYST BP LT 130 MM HG: CPT | Mod: CPTII,S$GLB,, | Performed by: FAMILY MEDICINE

## 2024-05-20 RX ORDER — ESOMEPRAZOLE MAGNESIUM 40 MG/1
40 CAPSULE, DELAYED RELEASE ORAL
Qty: 90 CAPSULE | Refills: 1 | Status: SHIPPED | OUTPATIENT
Start: 2024-05-20 | End: 2024-11-16

## 2024-05-20 NOTE — PROGRESS NOTES
Subjective:       Patient ID: Ciaran Canchola is a 73 y.o. male.    Chief Complaint: Diabetes and Hypertension      Reports his blood pressures have been stable and controlled at home but only checks it sporadically.  Lab Results       Component                Value               Date                       WBC                      8.4                 08/24/2022                 HGB                      15.4                08/24/2022                 HCT                      46.9                08/24/2022                 PLT                      210                 08/24/2022                 CHOL                     132                 11/20/2023                 TRIG                     207 (H)             11/20/2023                 HDL                      38 (L)              11/20/2023                 ALT                      32                  11/20/2023                 AST                      23                  11/20/2023                 NA                       135 (L)             11/20/2023                 K                        4.1                 11/20/2023                 CL                       102                 11/20/2023                 CREATININE               1.3                 11/20/2023                 BUN                      24 (H)              11/20/2023                 CO2                      27                  11/20/2023                 TSH                      1.66                01/27/2021                 HGBA1C                   7.2 (H)             11/20/2023                 MICROALBUR               8.6                 02/11/2022            Wt Readings from Last 3 Encounters:  05/20/24 : 116.1 kg (256 lb)  04/17/24 : 121.1 kg (266 lb 15.6 oz)  02/01/24 : 121.1 kg (266 lb 15.6 oz)        Diabetes  He presents for his follow-up diabetic visit. He has type 2 diabetes mellitus. His disease course has been stable. Pertinent negatives for hypoglycemia include no confusion or headaches.  Associated symptoms include polyuria. Pertinent negatives for diabetes include no blurred vision, no chest pain, no foot ulcerations, no polydipsia, no visual change and no weakness. His breakfast blood glucose range is generally 110-130 mg/dl.   Hypertension  Pertinent negatives include no blurred vision, chest pain, headaches, neck pain or palpitations.       Allergies and Medications:   Review of patient's allergies indicates:  No Known Allergies  Current Outpatient Medications   Medication Sig Dispense Refill    blood sugar diagnostic Strp To check BG 2 times daily, to use with insurance preferred meter 200 strip 3    cholecalciferol, vitamin D3, (VITAMIN D3) 50 mcg (2,000 unit) Tab Take 1 tablet (2,000 Units total) by mouth once daily. 30 tablet 11    lancets Misc To check BG 2 times daily, to use with insurance preferred meter 200 each 3    lisinopriL-hydrochlorothiazide (PRINZIDE,ZESTORETIC) 20-25 mg Tab Take 1 tablet by mouth once daily. 90 tablet 1    meloxicam (MOBIC) 15 MG tablet Take 1 tablet (15 mg total) by mouth once daily. 90 tablet 1    rosuvastatin (CRESTOR) 5 MG tablet Take 1 tablet (5 mg total) by mouth once daily. 90 tablet 1    sertraline (ZOLOFT) 50 MG tablet TAKE 2 TABLETS(100 MG) BY MOUTH EVERY EVENING 180 tablet 1    blood-glucose meter kit To check BG 2 times daily, to use with insurance preferred meter 1 each 0    empagliflozin (JARDIANCE) 10 mg tablet Take 1 tablet (10 mg total) by mouth once daily. 30 tablet 5    esomeprazole (NEXIUM) 40 MG capsule Take 1 capsule (40 mg total) by mouth before breakfast. 90 capsule 1     No current facility-administered medications for this visit.       Family History:   Family History   Problem Relation Name Age of Onset    ALS Mother      Heart disease Father      Heart disease Sister         Social History:   Social History     Socioeconomic History    Marital status:    Tobacco Use    Smoking status: Former     Current packs/day: 0.00      Types: Cigarettes     Quit date:      Years since quittin.4    Smokeless tobacco: Never    Tobacco comments:     Quit  35 years ago   Substance and Sexual Activity    Alcohol use: Yes    Drug use: Never    Sexual activity: Yes     Partners: Female     Social Determinants of Health     Financial Resource Strain: Low Risk  (2024)    Overall Financial Resource Strain (CARDIA)     Difficulty of Paying Living Expenses: Not hard at all   Food Insecurity: No Food Insecurity (2024)    Hunger Vital Sign     Worried About Running Out of Food in the Last Year: Never true     Ran Out of Food in the Last Year: Never true   Transportation Needs: No Transportation Needs (2024)    PRAPARE - Transportation     Lack of Transportation (Medical): No     Lack of Transportation (Non-Medical): No   Physical Activity: Unknown (2024)    Exercise Vital Sign     Days of Exercise per Week: 5 days   Stress: Stress Concern Present (2024)    [employer] - Occupational Stress Questionnaire     Feeling of Stress : To some extent   Housing Stability: Unknown (2024)    Housing Stability Vital Sign     Unable to Pay for Housing in the Last Year: No       Review of Systems   Constitutional:  Negative for activity change and unexpected weight change.   HENT:  Negative for hearing loss, rhinorrhea and trouble swallowing.    Eyes:  Positive for visual disturbance. Negative for blurred vision and discharge.   Respiratory:  Negative for chest tightness and wheezing.    Cardiovascular:  Negative for chest pain and palpitations.   Gastrointestinal:  Negative for blood in stool, constipation, diarrhea and vomiting.   Endocrine: Positive for polyuria. Negative for polydipsia.   Genitourinary:  Positive for urgency. Negative for difficulty urinating and hematuria.   Musculoskeletal:  Negative for arthralgias, joint swelling and neck pain.   Neurological:  Negative for weakness and headaches.    Psychiatric/Behavioral:  Negative for confusion and dysphoric mood.        Objective:     Vitals:    05/20/24 0825   BP: 110/70   Pulse: 76   Resp: 18   Temp: 98.7 °F (37.1 °C)        Physical Exam  Vitals and nursing note reviewed.   Constitutional:       General: He is not in acute distress.     Appearance: He is well-developed. He is not diaphoretic.   HENT:      Head: Normocephalic and atraumatic.      Right Ear: External ear normal.      Left Ear: External ear normal.      Nose: Nose normal.      Mouth/Throat:      Pharynx: No oropharyngeal exudate.   Eyes:      General: No scleral icterus.        Right eye: No discharge.         Left eye: No discharge.      Conjunctiva/sclera: Conjunctivae normal.      Pupils: Pupils are equal, round, and reactive to light.   Neck:      Thyroid: No thyromegaly.      Vascular: No JVD.      Trachea: No tracheal deviation.   Cardiovascular:      Rate and Rhythm: Normal rate and regular rhythm.      Pulses:           Dorsalis pedis pulses are 1+ on the right side and 1+ on the left side.        Posterior tibial pulses are 1+ on the right side and 1+ on the left side.      Heart sounds: Normal heart sounds. No murmur heard.     No friction rub. No gallop.   Pulmonary:      Effort: Pulmonary effort is normal. No respiratory distress.      Breath sounds: Normal breath sounds. No stridor. No wheezing or rales.   Chest:      Chest wall: No tenderness.   Abdominal:      General: Bowel sounds are normal. There is no distension.      Palpations: Abdomen is soft. There is no mass.      Tenderness: There is no abdominal tenderness. There is no guarding or rebound.      Hernia: No hernia is present.   Genitourinary:     Penis: Normal. No tenderness.       Prostate: Normal.      Rectum: Normal. Guaiac result negative.   Musculoskeletal:         General: No tenderness or deformity. Normal range of motion.      Cervical back: Normal range of motion and neck supple.      Right lower leg: No  edema.      Left lower leg: No edema.      Right foot: Normal range of motion. No deformity, bunion, Charcot foot, foot drop or prominent metatarsal heads.      Left foot: Normal range of motion. No deformity, bunion, Charcot foot, foot drop or prominent metatarsal heads.   Feet:      Right foot:      Protective Sensation: 3 sites tested.   1 site sensed.     Skin integrity: Dry skin present. No ulcer, blister, skin breakdown, erythema, warmth, callus or fissure.      Toenail Condition: Right toenails are normal.      Left foot:      Protective Sensation: 3 sites tested.  3 sites sensed.      Skin integrity: Dry skin present. No ulcer, blister, skin breakdown, erythema, warmth, callus or fissure.      Toenail Condition: Left toenails are normal.   Lymphadenopathy:      Cervical: No cervical adenopathy.   Skin:     General: Skin is warm and dry.      Coloration: Skin is not pale.      Findings: No erythema or rash.   Neurological:      Mental Status: He is alert and oriented to person, place, and time.      Cranial Nerves: No cranial nerve deficit.      Motor: No abnormal muscle tone.      Coordination: Coordination normal.      Deep Tendon Reflexes: Reflexes are normal and symmetric. Reflexes normal.   Psychiatric:         Behavior: Behavior normal.         Thought Content: Thought content normal.         Judgment: Judgment normal.         Assessment:       1. Essential hypertension    2. Mixed hyperlipidemia    3. Type 2 diabetes mellitus with hyperglycemia, without long-term current use of insulin    4. Low testosterone    5. Encounter for prostate cancer screening    6. Gastroesophageal reflux disease, unspecified whether esophagitis present        Plan:       Ciaran was seen today for diabetes and hypertension.    Diagnoses and all orders for this visit:    Essential hypertension    Mixed hyperlipidemia stable on current meds.  Patient is a diabetic requiring a statin.  -     Lipid Panel; Future  -      Comprehensive Metabolic Panel; Future    Type 2 diabetes mellitus with hyperglycemia, without long-term current use of insulin stable but could have some improvement patient will be continued on Jardiance 10 mg which had .  -     Discontinue: empagliflozin (JARDIANCE) 10 mg tablet; Take 1 tablet (10 mg total) by mouth once daily.  -     Hemoglobin A1C; Future  -     Microalbumin/Creatinine Ratio, Urine; Future  -     empagliflozin (JARDIANCE) 10 mg tablet; Take 1 tablet (10 mg total) by mouth once daily.    Low testosterone off of hormonal therapy at this time because of elevated PSA will follow-up on that.  -     Testosterone, Free; Future    Encounter for prostate cancer screening due for annual screening  -     PSA, SCREENING; Future    Gastroesophageal reflux disease, unspecified whether esophagitis present  Comments:  nexium qd in rx form. If not covered says that he will remain on otc  Orders:  -     esomeprazole (NEXIUM) 40 MG capsule; Take 1 capsule (40 mg total) by mouth before breakfast.         No follow-ups on file.

## 2024-06-24 ENCOUNTER — PATIENT OUTREACH (OUTPATIENT)
Dept: ADMINISTRATIVE | Facility: HOSPITAL | Age: 74
End: 2024-06-24
Payer: MEDICARE

## 2024-06-24 DIAGNOSIS — E78.2 MIXED HYPERLIPIDEMIA: ICD-10-CM

## 2024-06-24 RX ORDER — ROSUVASTATIN CALCIUM 5 MG/1
5 TABLET, COATED ORAL DAILY
Qty: 90 TABLET | Refills: 1 | Status: SHIPPED | OUTPATIENT
Start: 2024-06-24 | End: 2024-12-21

## 2024-06-24 NOTE — PROGRESS NOTES
Population Health Chart Review & Patient Outreach Details      Additional [facility] Health Notes:               Updates Requested / Reviewed:      Updated Care Coordination Note, Care Everywhere, , and Immunizations Reconciliation Completed or Queried: [facility] Topics Overdue:      [facility] Score: 1     Hemoglobin A1c    Shingles/Zoster Vaccine  RSV Vaccine                  Health Maintenance Topic(s) Outreach Outcomes & Actions Taken:    Medication Adherence / Statins - Outreach Outcomes & Actions Taken  : A refill request was sent to the provider.

## 2024-09-03 DIAGNOSIS — M19.90 ARTHRITIS: ICD-10-CM

## 2024-09-03 DIAGNOSIS — I10 ESSENTIAL HYPERTENSION: ICD-10-CM

## 2024-09-03 DIAGNOSIS — F33.42 RECURRENT MAJOR DEPRESSIVE DISORDER, IN FULL REMISSION: ICD-10-CM

## 2024-09-03 DIAGNOSIS — E78.2 MIXED HYPERLIPIDEMIA: ICD-10-CM

## 2024-09-03 RX ORDER — ROSUVASTATIN CALCIUM 5 MG/1
5 TABLET, COATED ORAL DAILY
Qty: 90 TABLET | Refills: 1 | Status: CANCELLED | OUTPATIENT
Start: 2024-09-03 | End: 2025-03-02

## 2024-09-03 RX ORDER — MELOXICAM 15 MG/1
15 TABLET ORAL DAILY
Qty: 90 TABLET | Refills: 1 | Status: SHIPPED | OUTPATIENT
Start: 2024-09-03

## 2024-09-03 RX ORDER — SERTRALINE HYDROCHLORIDE 50 MG/1
TABLET, FILM COATED ORAL
Qty: 180 TABLET | Refills: 1 | Status: SHIPPED | OUTPATIENT
Start: 2024-09-03

## 2024-09-03 RX ORDER — LISINOPRIL AND HYDROCHLOROTHIAZIDE 20; 25 MG/1; MG/1
1 TABLET ORAL DAILY
Qty: 90 TABLET | Refills: 1 | Status: SHIPPED | OUTPATIENT
Start: 2024-09-03

## 2024-09-10 DIAGNOSIS — M19.90 ARTHRITIS: ICD-10-CM

## 2024-09-10 RX ORDER — MELOXICAM 15 MG/1
15 TABLET ORAL
Qty: 90 TABLET | Refills: 1 | OUTPATIENT
Start: 2024-09-10

## 2024-11-20 ENCOUNTER — OFFICE VISIT (OUTPATIENT)
Dept: FAMILY MEDICINE | Facility: CLINIC | Age: 74
End: 2024-11-20
Payer: MEDICARE

## 2024-11-20 ENCOUNTER — LAB VISIT (OUTPATIENT)
Dept: LAB | Facility: HOSPITAL | Age: 74
End: 2024-11-20
Attending: FAMILY MEDICINE
Payer: MEDICARE

## 2024-11-20 VITALS
TEMPERATURE: 98 F | RESPIRATION RATE: 18 BRPM | SYSTOLIC BLOOD PRESSURE: 118 MMHG | BODY MASS INDEX: 37.18 KG/M2 | HEART RATE: 73 BPM | HEIGHT: 69 IN | WEIGHT: 251 LBS | DIASTOLIC BLOOD PRESSURE: 60 MMHG | OXYGEN SATURATION: 95 %

## 2024-11-20 DIAGNOSIS — E11.65 TYPE 2 DIABETES MELLITUS WITH HYPERGLYCEMIA, WITHOUT LONG-TERM CURRENT USE OF INSULIN: ICD-10-CM

## 2024-11-20 DIAGNOSIS — R79.89 LOW TESTOSTERONE: ICD-10-CM

## 2024-11-20 DIAGNOSIS — E66.01 CLASS 3 SEVERE OBESITY DUE TO EXCESS CALORIES WITHOUT SERIOUS COMORBIDITY WITH BODY MASS INDEX (BMI) OF 40.0 TO 44.9 IN ADULT: ICD-10-CM

## 2024-11-20 DIAGNOSIS — E78.2 MIXED HYPERLIPIDEMIA: ICD-10-CM

## 2024-11-20 DIAGNOSIS — I10 ESSENTIAL HYPERTENSION: Primary | ICD-10-CM

## 2024-11-20 DIAGNOSIS — E66.813 CLASS 3 SEVERE OBESITY DUE TO EXCESS CALORIES WITHOUT SERIOUS COMORBIDITY WITH BODY MASS INDEX (BMI) OF 40.0 TO 44.9 IN ADULT: ICD-10-CM

## 2024-11-20 DIAGNOSIS — Z12.5 ENCOUNTER FOR PROSTATE CANCER SCREENING: ICD-10-CM

## 2024-11-20 PROBLEM — F32.5 MAJOR DEPRESSIVE DISORDER IN FULL REMISSION: Status: RESOLVED | Noted: 2020-02-13 | Resolved: 2024-11-20

## 2024-11-20 LAB
ALBUMIN SERPL BCP-MCNC: 4 G/DL (ref 3.5–5.2)
ALP SERPL-CCNC: 52 U/L (ref 40–150)
ALT SERPL W/O P-5'-P-CCNC: 29 U/L (ref 10–44)
ANION GAP SERPL CALC-SCNC: 9 MMOL/L (ref 8–16)
AST SERPL-CCNC: 28 U/L (ref 10–40)
BILIRUB SERPL-MCNC: 0.4 MG/DL (ref 0.1–1)
BUN SERPL-MCNC: 25 MG/DL (ref 8–23)
CALCIUM SERPL-MCNC: 9.4 MG/DL (ref 8.7–10.5)
CHLORIDE SERPL-SCNC: 104 MMOL/L (ref 95–110)
CHOLEST SERPL-MCNC: 147 MG/DL (ref 120–199)
CHOLEST/HDLC SERPL: 4.1 {RATIO} (ref 2–5)
CO2 SERPL-SCNC: 23 MMOL/L (ref 23–29)
COMPLEXED PSA SERPL-MCNC: 1.9 NG/ML (ref 0–4)
CREAT SERPL-MCNC: 1.4 MG/DL (ref 0.5–1.4)
EST. GFR  (NO RACE VARIABLE): 52.7 ML/MIN/1.73 M^2
ESTIMATED AVG GLUCOSE: 137 MG/DL (ref 68–131)
GLUCOSE SERPL-MCNC: 117 MG/DL (ref 70–110)
HBA1C MFR BLD: 6.4 % (ref 4–5.6)
HDLC SERPL-MCNC: 36 MG/DL (ref 40–75)
HDLC SERPL: 24.5 % (ref 20–50)
LDLC SERPL CALC-MCNC: 69.2 MG/DL (ref 63–159)
NONHDLC SERPL-MCNC: 111 MG/DL
POTASSIUM SERPL-SCNC: 4.3 MMOL/L (ref 3.5–5.1)
PROT SERPL-MCNC: 7.5 G/DL (ref 6–8.4)
SODIUM SERPL-SCNC: 136 MMOL/L (ref 136–145)
TRIGL SERPL-MCNC: 209 MG/DL (ref 30–150)

## 2024-11-20 PROCEDURE — 84153 ASSAY OF PSA TOTAL: CPT | Performed by: FAMILY MEDICINE

## 2024-11-20 PROCEDURE — 80053 COMPREHEN METABOLIC PANEL: CPT | Performed by: FAMILY MEDICINE

## 2024-11-20 PROCEDURE — 83036 HEMOGLOBIN GLYCOSYLATED A1C: CPT | Performed by: FAMILY MEDICINE

## 2024-11-20 PROCEDURE — 80061 LIPID PANEL: CPT | Performed by: FAMILY MEDICINE

## 2024-11-20 PROCEDURE — 82040 ASSAY OF SERUM ALBUMIN: CPT | Performed by: FAMILY MEDICINE

## 2024-11-20 PROCEDURE — 99999 PR PBB SHADOW E&M-EST. PATIENT-LVL IV: CPT | Mod: PBBFAC,,, | Performed by: FAMILY MEDICINE

## 2024-11-20 RX ORDER — EMPAGLIFLOZIN 10 MG/1
10 TABLET, FILM COATED ORAL
COMMUNITY
Start: 2024-11-07

## 2024-11-20 NOTE — PATIENT INSTRUCTIONS
We understand that controlling diabetes can feel overwhelming at times. We are here to offer the tools and support to help you manage your diabetes and meet your health goals. Please reference the meal planning guide below.     Diabetic Meal Planning    About this topic  Healthy eating is an important part of keeping your diabetes in control. A balanced diet along with your diabetic drugs will help you control your blood sugar. The right portions of healthy foods may help keep your sugar within your goal range. It may also help to lower or maintain your weight, manage cholesterol, the amount of fat in your blood, and blood pressure.      Image(s)    What will the results be?  Healthy eating may help you lower your blood sugar and keep it in a safe range. Keeping your blood sugar in a safe range may lower your chances for long term problems from your diabetes. You may be less likely to have damage to your kidneys, heart, eyes, or nerves.    What changes to diet are needed?  Healthy eating means:  Eating a range of foods from all food groups.  Eating the right size portions. Read the nutrition facts on each food you eat.  Eating meals and snacks at the same time each day. Do not skip a meal or snack. Skipping meals may cause your blood sugar to go too low, especially if you are on drugs for your diabetes. Skipping meals can also cause you to eat too much at the next meal.  Talk to your diabetes educator about making a personal meal plan for you. They can also help you eat the foods you like by modifying them to be healthier.    Who should use this diet?  This diet is helpful to people with high blood sugar or diabetes.    What foods are good to eat?  It is important to make a healthy meal. Eat a variety of different foods in the right portion.  Fill half of your plate with non-starchy vegetables. Non-starchy vegetables include: Broccoli, green beans, carrots, greens (huang, kale, mustard, turnip), onions, tomatoes,  asparagus, beets, cauliflower, celery, and cucumber. Non-starchy vegetables are high in fiber and low in carbohydrates. These will help keep you full for longer without raising your blood sugar.  Fill 1/4 of your plate with carbohydrates. Try to choose whole grain options. Whole-grain products have more fiber which will make you feel full. Carbohydrates include: Bread, rice, pasta, and starchy vegetables. Starchy vegetables include beans, potatoes, acorn squash, butternut squash, corn, and peas.  Fill 1/4 of your plate with protein. Choose low-fat cuts of meat like boneless, skinless chicken breast; pork loin; 90% lean beef; lean and skinless turkey meat; and fresh fish (not fried) such as tuna, salmon, or mackerel.  Add a low-fat or non-fat dairy product like 8 ounces (240 mL) of 1% or skim milk or 6 ounces (180 mL) of low-fat yogurt. Eat the recommended serving. Milk and yogurt have carbohydrates which raise your blood sugar.  Add a small piece of fruit or 1/2 cup (120 grams) of frozen or canned fruit. Choose canned fruits in juice or water. Fruits include apples, bananas, peaches, pears, pineapple, plums, and oranges. Eat the recommended serving. Fruit has carbohydrates which can raise your blood sugar.  Include healthy fats in your meal like olive oil, canola oil, avocado, and nuts.  Other good foods to include in your diet are:  Foods high in fiber. Adding fiber to your meals may help control your blood sugar and cholesterol levels. It may also help with weight loss and prevent belly problems. If you are younger than 50, it is recommended to get 25 to 38 grams per day. If you are older than 50, it is recommended to get 21 to 30 grams per day. Good sources of fiber include:  Nuts and seeds  Beans, peas, and other legumes  Whole-grain products  Fruits  Vegetables  Smart snacks in the right portion. Do not go too long in between meals. Ask your dietitian when you should have a snack in between your meals. Snack on  things like:  Nuts  Vegetables and low-fat dressing  Light popcorn  Low-fat cheese and crackers  1/2 sandwich    What foods should be limited or avoided?  You may need to limit the amount of some foods you eat and how often you eat them. Foods that should be limited include:  High fat or processed foods like:  Ziegler  Sausage  Hot dogs  Whole-fat dairy products  Potato chips  Foods high in sodium like:  Canned soups  Soy sauce and some salad dressings  Cured meats  Salted snack foods like pretzels  Olives  Fats and oils like:  Margarine  Salad dressing  Gravy  Lard or shortening  Foods high in sugar like:  Candy  Cookies  Cake  Ice cream  Table sugar  Soda  Juice drinks  Starches that are not whole grain like:  White rice  French fries  White pasta  White bread  Sugary cereals  Baked goods, pastries, croissants  Beer, wine, and mixed drinks (alcohol). Drink alcohol in moderation (1 drink per day or less for adult women and 2 drinks per day or less for adult men). Drinking alcohol can cause fluctuations in your blood sugar and interfere with how your diabetic drugs work. Talk to your doctor about how you can safely include alcohol into your diet.    What can be done to prevent this health problem?  Some people have a higher chance of developing diabetes than others. This is a life-long problem. You can still lead a normal life. Diabetes can be managed through diet, exercise, and drugs. It is important to have support from your family and friends to help you with your goals.    When do I need to call the doctor?  Blood sugar level is above 240 mg/dL for more than a day  Blood sugar level drops to less than 40 and does not respond to 15 grams of simple carbohydrate, like 4 glucose tablets or 1/2 cup (120 mL) of fruit juice  Trouble breathing  Very sleepy and trouble concentrating  Feeling very thirsty  Needing to urinate (pee) more than normal  Throw up more than once or have many loose stools  You are so sick you  cannot eat or drink  Fever over 101°F (38°C)  Questions about your diet plan  You are not feeling better in 2 to 3 days or you are feeling worse    Helpful tips  Plan ahead. Plan your meals and grocery list before going to the store. This will help you to choose foods that are good for you.  Pack a lunch. Take healthy meals and snacks with you to work.  Keep a food journal to help keep you on track. Take note of foods that keep your blood sugar in goal range. Also note foods and meals that raise or lower your blood sugar. There are apps for your phone that can help with this.  Visit a restaurant's website before eating out. You can see the menu options and nutritional facts. This way, you can see which items best fit into your diet plan. Call ahead if you have questions.    Disclaimer.This generalized information is a limited summary of diagnosis, treatment, and/or medication information. It is not meant to be comprehensive and should be used as a tool to help the user understand and/or assess potential diagnostic and treatment options. It does NOT include all information about conditions, treatments, medications, side effects, or risks that may apply to a specific patient. It is not intended to be medical advice or a substitute for the medical advice, diagnosis, or treatment of a health care provider based on the health care provider's examination and assessment of a patient's specific and unique circumstances. Patients must speak with a health care provider for complete information about their health, medical questions, and treatment options, including any risks or benefits regarding use of medications. This information does not endorse any treatments or medications as safe, effective, or approved for treating a specific patient. [employer] and its affiliates disclaim any warranty or liability relating to this information or the use thereof. The use of this information is governed by the Terms of Use,  available at Terms of Use. ©2022 [employer] and its affiliates and/or licensors. All rights reserved. Avoid anything with sugar in the 1st 3 ingredients including honey and syrup.  Avoid dried fruits like raise nodes and apricots.  Other fruits and vegetables are okay but the sugar content is higher in bananas and grapes.  Avoid large portions on your starchy foods:  Bread rice potatoes and pasta.

## 2024-11-20 NOTE — PROGRESS NOTES
Subjective:       Patient ID: Ciaran Canchola is a 74 y.o. male.  BP Readings from Last 3 Encounters:   11/20/24 118/60   05/20/24 110/70   03/13/24 115/61     Lab Results   Component Value Date    WBC 8.4 08/24/2022    HGB 15.4 08/24/2022    HCT 46.9 08/24/2022     08/24/2022    CHOL 132 11/20/2023    TRIG 207 (H) 11/20/2023    HDL 38 (L) 11/20/2023    ALT 32 11/20/2023    AST 23 11/20/2023     (L) 11/20/2023    K 4.1 11/20/2023     11/20/2023    CREATININE 1.3 11/20/2023    BUN 24 (H) 11/20/2023    CO2 27 11/20/2023    TSH 1.66 01/27/2021    HGBA1C 7.2 (H) 11/20/2023    MICROALBUR 8.6 02/11/2022     Wt Readings from Last 4 Encounters:   11/20/24 113.9 kg (251 lb)   05/20/24 116.1 kg (256 lb)   04/17/24 121.1 kg (266 lb 15.6 oz)   02/01/24 121.1 kg (266 lb 15.6 oz)           Chief Complaint: Hypertension, Diabetes, and Knee Pain      History of Present Illness    CHIEF COMPLAINT:  Mr. Canchola presents for a follow-up visit to discuss blood sugar and blood pressure management, as well as for routine health monitoring.    HPI:  Mr. aCnchola, a 74-year-old male, reports feeling as well as can be expected for his age, though he requires additional time to rise in the morning. He has frequent urination, approximately 4-5 times daily, partly attributed to taking Jardiance at night instead of in the morning as prescribed. He reports no issues with his urinary stream.    Mr. Canchola has lost 15 lbs since April, 7-8 months ago. 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 He also notes that Jardiance seems to have decreased his appetite.    Regarding medical care, the patient received a flu vaccine about 2 months ago, around mid-September, at a local [facility]. He also received an RSV vaccination and the first dose of the shingles vaccine, both around February or  March, either at [facility] or [facility]. Mr. Canchola has had cataract operations on both eyes but reports no retinopathy from his diabetes.    Mr. Canchola denies having kidney stones, low vitamin D levels in the past, or any symptoms of retinopathy from diabetes.    MEDICATIONS:  Mr. Canchola is on Jardiance, taken at night for diabetes. He is also taking DHEA as a supplement to decrease cortisol production and boost testosterone.    MEDICAL HISTORY:  Mr. Canchola has a history of essential hypertension, type 2 diabetes, and enlarged prostate. He received a flu vaccine about 2 months ago at [facility] (around September 11th). Mr. Canchola also received an RSV vaccine and the first dose of the shingles vaccine in February or March at either [facility] or [facility]. He has received a COVID-19 booster.    SURGICAL HISTORY:  Mr. Canchola has undergone cataract surgery on both eyes.    TEST RESULTS:  Mr. Canchola had an A1C test, lipid panel, and PSA test performed 1 year ago. His testosterone levels have been previously tested. He recently completed an eye exam, which showed no signs of retinopathy.    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 His work schedule involves working 2 weeks out of the month.      ROS:  Constitutional: +weight loss  Genitourinary: +frequency          Allergies and Medications:   Review of patient's allergies indicates:  No Known Allergies  Current Outpatient Medications   Medication Sig Dispense Refill    blood sugar diagnostic Strp To check BG 2 times daily, to use with insurance preferred meter 200 strip 3    JARDIANCE 10 mg tablet Take 10 mg by mouth.      lancets Misc To check BG 2 times daily, to use with insurance preferred meter 200 each 3    lisinopriL-hydrochlorothiazide (PRINZIDE,ZESTORETIC) 20-25 mg Tab Take 1 tablet by mouth once daily. 90 tablet 1    meloxicam (MOBIC) 15 MG tablet Take 1 tablet (15 mg total) by mouth once daily. 90  tablet 1    rosuvastatin (CRESTOR) 5 MG tablet Take 1 tablet (5 mg total) by mouth once daily. 90 tablet 1    sertraline (ZOLOFT) 50 MG tablet TAKE 2 TABLETS(100 MG) BY MOUTH EVERY EVENING 180 tablet 1    blood-glucose meter kit To check BG 2 times daily, to use with insurance preferred meter 1 each 0    esomeprazole (NEXIUM) 40 MG capsule Take 1 capsule (40 mg total) by mouth before breakfast. 90 capsule 1     No current facility-administered medications for this visit.       Family History:   Family History   Problem Relation Name Age of Onset    ALS Mother      Heart disease Father      Heart disease Sister         Social History:   Social History     Socioeconomic History    Marital status:    Tobacco Use    Smoking status: Former     Current packs/day: 0.00     Types: Cigarettes     Quit date:      Years since quittin.9    Smokeless tobacco: Never    Tobacco comments:     Quit  35 years ago   Substance and Sexual Activity    Alcohol use: Yes    Drug use: Never    Sexual activity: Yes     Partners: Female     Social Drivers of Health     Financial Resource Strain: Low Risk  (2024)    Overall Financial Resource Strain (CARDIA)     Difficulty of Paying Living Expenses: Not hard at all   Food Insecurity: No Food Insecurity (2024)    Hunger Vital Sign     Worried About Running Out of Food in the Last Year: Never true     Ran Out of Food in the Last Year: Never true   Transportation Needs: No Transportation Needs (2024)    PRAPARE - Transportation     Lack of Transportation (Medical): No     Lack of Transportation (Non-Medical): No   Physical Activity: Insufficiently Active (2024)    Exercise Vital Sign     Days of Exercise per Week: 3 days     Minutes of Exercise per Session: 20 min   Stress: No Stress Concern Present (2024)    [employer] - Occupational Stress Questionnaire     Feeling of Stress : Only a little   Housing Stability: Unknown  (11/20/2024)    Housing Stability Vital Sign     Unable to Pay for Housing in the Last Year: No           Objective:     Vitals:    11/20/24 0936   BP: 118/60   Pulse: 73   Resp: 18   Temp: 98.3 °F (36.8 °C)        Physical Exam    Vitals: Weight loss of 15 lbs since April. Blood pressure: 118/16. Pulse ox 95% on room air.  General: No acute distress. Well-developed. Well-nourished.  Eyes: EOMI. Sclerae anicteric.  HENT: Normocephalic. Atraumatic. Nares patent. Moist oral mucosa.  Cardiovascular: Regular rate. Regular rhythm. No murmurs. No rubs. No gallops. Normal S1, S2.  Respiratory: Normal respiratory effort. Clear to auscultation bilaterally. No rales. No rhonchi. No wheezing.  Abdomen: Abdomen is protuberant.  Musculoskeletal: No  obvious deformity.  Extremities: No lower extremity edema.  Neurological: Alert & oriented x3. No slurred speech. Normal gait.  Psychiatric: Normal mood. Normal affect. Good insight. Good judgment.  Skin: Warm. Dry. No rash.            Assessment:       1. Essential hypertension    2. Class 3 severe obesity due to excess calories without serious comorbidity with body mass index (BMI) of 40.0 to 44.9 in adult    3. Mixed hyperlipidemia    4. Encounter for prostate cancer screening    5. Low testosterone    6. Type 2 diabetes mellitus with hyperglycemia, without long-term current use of insulin        Plan:       Assessment & Plan    Continued management of essential hypertension and type 2 diabetes  Blood pressure well-controlled based on recent readings  Monitored weight loss progress; patient has lost 15 lbs since April  Considering once-weekly injectable medications for diabetes management if A1C results are elevated  Evaluated effectiveness of current medication regimen, including Jardiance  Assessed prostate health and urinary symptoms    TYPE 2 DIABETES MELLITUS:  - Educated on the benefits of Jardiance for heart, blood sugar, and kidney health.  - Discussed the importance of  proper timing for Jardiance administration to minimize nighttime urination.  - Explained the potential for appetite suppression as a side effect of Jardiance.  - Changed Jardiance: Take in the morning instead of at night to reduce nighttime urination.  - A1C test ordered.    DIETARY COUNSELING AND WEIGHT MANAGEMENT:  - Provided information on the primary contributors to obesity: bread, rice, potatoes, and pasta.  - Mr. Canchola to continue current weight loss efforts.  - Recommend implementing portion control in diet.  - Mr. Canchola to limit intake of starches, particularly bread, rice, potatoes, and pasta.    BENIGN PROSTATIC HYPERPLASIA:  - PSA (prostate-specific antigen) test ordered.    GENERAL ADULT MEDICAL EXAMINATION:  - Lipid panel ordered.    FOLLOW-UP:  - Follow up in 6 months.  - Next visit to be set up as an HRA (Health Risk Assessment) exam with a nurse practitioner.        Ciaran was seen today for hypertension, diabetes and knee pain.    Diagnoses and all orders for this visit:    Essential hypertension    Class 3 severe obesity due to excess calories without serious comorbidity with body mass index (BMI) of 40.0 to 44.9 in adult    Mixed hyperlipidemia  -     Lipid Panel; Future  -     Comprehensive Metabolic Panel; Future    Encounter for prostate cancer screening  -     PSA, Screening; Future    Low testosterone  -     Testosterone Panel; Future    Type 2 diabetes mellitus with hyperglycemia, without long-term current use of insulin  -     Hemoglobin A1C; Future         Follow up in about 6 months (around 5/20/2025) for follow up DM, follow up HTN, HRA with [facility] or [facility], follow up cholesterol.  This note was generated with the assistance of ambient listening technology. Verbal consent was obtained by the patient and accompanying visitor(s) for the recording of patient appointment to facilitate this note. I attest to having reviewed and edited the generated note for accuracy, though some  syntax or spelling errors may persist. Please contact the author of this note for any clarification.     Answers submitted by the patient for this visit:  Review of Systems Questionnaire (Submitted on 11/20/2024)  activity change: No  unexpected weight change: No  neck pain: No  hearing loss: No  rhinorrhea: No  trouble swallowing: No  eye discharge: No  visual disturbance: No  chest tightness: No  wheezing: No  chest pain: No  palpitations: No  blood in stool: No  constipation: No  vomiting: No  diarrhea: No  polydipsia: No  polyuria: No  difficulty urinating: No  urgency: No  hematuria: No  joint swelling: No  arthralgias: No  headaches: No  weakness: No  confusion: No  dysphoric mood: No

## 2024-11-27 LAB
ALBUMIN SERPL-MCNC: 4.2 G/DL (ref 3.6–5.1)
SHBG SERPL-SCNC: 20 NMOL/L (ref 22–77)
TESTOST FREE SERPL-MCNC: 12.5 PG/ML (ref 6–73)
TESTOST SERPL-MCNC: 72 NG/DL (ref 250–1100)
TESTOSTERONE.FREE+WB SERPL-MCNC: 24.1 NG/DL (ref 15–150)

## 2024-11-29 ENCOUNTER — TELEPHONE (OUTPATIENT)
Dept: FAMILY MEDICINE | Facility: CLINIC | Age: 74
End: 2024-11-29
Payer: MEDICARE

## 2024-11-29 NOTE — PROGRESS NOTES
The total testosterone level is low possibly partly related to low testosterone binding globulin the free testosterone and bioavailable testosterone are within range however you might benefit from low-dose testosterone replacement the PSA will need to be monitored closely..  The downside it the of this is it might worsen your prostate symptoms.  We can do a follow-up visit or a virtual visit to determine the proper dosage and formulation.

## 2024-11-29 NOTE — TELEPHONE ENCOUNTER
----- Message from Obie Argueta MD sent at 11/29/2024  1:17 PM CST -----  The total testosterone level is low possibly partly related to low testosterone binding globulin the free testosterone and bioavailable testosterone are within range however you might benefit from low-dose testosterone replacement the PSA will need to be monitored closely..  The downside it the of this is it might worsen your prostate symptoms.  We can do a follow-up visit or a virtual visit to determine the proper dosage and formulation.

## 2024-12-13 DIAGNOSIS — M19.90 ARTHRITIS: ICD-10-CM

## 2024-12-13 DIAGNOSIS — K21.9 GASTROESOPHAGEAL REFLUX DISEASE, UNSPECIFIED WHETHER ESOPHAGITIS PRESENT: ICD-10-CM

## 2024-12-13 DIAGNOSIS — I10 ESSENTIAL HYPERTENSION: ICD-10-CM

## 2024-12-13 DIAGNOSIS — F33.42 RECURRENT MAJOR DEPRESSIVE DISORDER, IN FULL REMISSION: ICD-10-CM

## 2024-12-13 DIAGNOSIS — E78.2 MIXED HYPERLIPIDEMIA: ICD-10-CM

## 2024-12-13 DIAGNOSIS — E11.65 TYPE 2 DIABETES MELLITUS WITH HYPERGLYCEMIA, WITHOUT LONG-TERM CURRENT USE OF INSULIN: ICD-10-CM

## 2024-12-15 RX ORDER — SERTRALINE HYDROCHLORIDE 50 MG/1
TABLET, FILM COATED ORAL
Qty: 180 TABLET | Refills: 0 | Status: SHIPPED | OUTPATIENT
Start: 2024-12-15

## 2024-12-15 RX ORDER — MELOXICAM 15 MG/1
15 TABLET ORAL DAILY
Qty: 90 TABLET | Refills: 0 | Status: SHIPPED | OUTPATIENT
Start: 2024-12-15

## 2024-12-15 RX ORDER — ESOMEPRAZOLE MAGNESIUM 40 MG/1
40 CAPSULE, DELAYED RELEASE ORAL
Qty: 90 CAPSULE | Refills: 0 | Status: SHIPPED | OUTPATIENT
Start: 2024-12-15

## 2024-12-15 RX ORDER — ISOPROPYL ALCOHOL 70 ML/100ML
2 SWAB TOPICAL DAILY
Qty: 200 EACH | Refills: 3 | Status: SHIPPED | OUTPATIENT
Start: 2024-12-15

## 2024-12-15 RX ORDER — LISINOPRIL AND HYDROCHLOROTHIAZIDE 20; 25 MG/1; MG/1
1 TABLET ORAL DAILY
Qty: 90 TABLET | Refills: 0 | Status: SHIPPED | OUTPATIENT
Start: 2024-12-15

## 2024-12-15 RX ORDER — ROSUVASTATIN CALCIUM 5 MG/1
5 TABLET, COATED ORAL NIGHTLY
Qty: 90 TABLET | Refills: 0 | Status: SHIPPED | OUTPATIENT
Start: 2024-12-15

## 2024-12-15 RX ORDER — LANCETS
EACH MISCELLANEOUS
Qty: 200 EACH | Refills: 0 | Status: SHIPPED | OUTPATIENT
Start: 2024-12-15

## 2024-12-15 RX ORDER — DEXTROSE 4 G
1 TABLET,CHEWABLE ORAL DAILY
Qty: 1 EACH | Refills: 0 | Status: SHIPPED | OUTPATIENT
Start: 2024-12-15

## 2025-01-15 ENCOUNTER — OFFICE VISIT (OUTPATIENT)
Dept: ORTHOPEDICS | Facility: CLINIC | Age: 75
End: 2025-01-15
Payer: MEDICARE

## 2025-01-15 VITALS — BODY MASS INDEX: 37.2 KG/M2 | HEIGHT: 69 IN | WEIGHT: 251.13 LBS

## 2025-01-15 DIAGNOSIS — M17.11 PRIMARY OSTEOARTHRITIS OF RIGHT KNEE: Primary | ICD-10-CM

## 2025-01-15 PROCEDURE — 1159F MED LIST DOCD IN RCRD: CPT | Mod: CPTII,S$GLB,, | Performed by: ORTHOPAEDIC SURGERY

## 2025-01-15 PROCEDURE — 99999 PR PBB SHADOW E&M-EST. PATIENT-LVL III: CPT | Mod: PBBFAC,,, | Performed by: ORTHOPAEDIC SURGERY

## 2025-01-15 PROCEDURE — 1160F RVW MEDS BY RX/DR IN RCRD: CPT | Mod: CPTII,S$GLB,, | Performed by: ORTHOPAEDIC SURGERY

## 2025-01-15 PROCEDURE — 99214 OFFICE O/P EST MOD 30 MIN: CPT | Mod: S$GLB,,, | Performed by: ORTHOPAEDIC SURGERY

## 2025-01-15 PROCEDURE — 3072F LOW RISK FOR RETINOPATHY: CPT | Mod: CPTII,S$GLB,, | Performed by: ORTHOPAEDIC SURGERY

## 2025-01-15 PROCEDURE — 1125F AMNT PAIN NOTED PAIN PRSNT: CPT | Mod: CPTII,S$GLB,, | Performed by: ORTHOPAEDIC SURGERY

## 2025-01-15 PROCEDURE — 1101F PT FALLS ASSESS-DOCD LE1/YR: CPT | Mod: CPTII,S$GLB,, | Performed by: ORTHOPAEDIC SURGERY

## 2025-01-15 PROCEDURE — 3008F BODY MASS INDEX DOCD: CPT | Mod: CPTII,S$GLB,, | Performed by: ORTHOPAEDIC SURGERY

## 2025-01-15 PROCEDURE — 3288F FALL RISK ASSESSMENT DOCD: CPT | Mod: CPTII,S$GLB,, | Performed by: ORTHOPAEDIC SURGERY

## 2025-01-15 NOTE — PROGRESS NOTES
Subjective:      Patient ID: Ciaran Canchola is a 74 y.o. male.    Chief Complaint: Pain of the Right Knee (Wanting to consult for possible knee surgery.)    HPI  Patient follow up on his right knee.  He has given this consideration over the last several months with the ongoing and progressive knee pain that is limiting activities in desires total knee replacement.  He only got short-term improvement with the previous injections etc..  ROS      Objective:    Ortho Exam     Constitutional:   Patient is alert  and oriented in no acute distress  HEENT:  normocephalic atraumatic; PERRL EOMI  Neck:  Supple without adenopathy  Cardiovascular:  Normal rate and rhythm  Pulmonary:  Normal respiratory effort normal chest wall expansion  Abdominal:  Nonprotuberant nondistended  Musculoskeletal:  Mildly antalgic gait  Diffuse crepitus and joint line tenderness with range of motion  Has difficulty with full active knee extension  No gross instability or effusion no increased warmth or erythema adequate active motor strength  Neurological:  No focal defect; cranial nerves 2-12 grossly intact  Psychiatric/behavioral:  Mood and behavior normal           My Radiographs Findings:    Patient has severe grade 4 degenerative changes with joint space narrowing subchondral sclerosis and osteophyte formation  Assessment:       Encounter Diagnosis   Name Primary?    Primary osteoarthritis of right knee Yes         Plan:       I have discussed medical condition treatment options with him at length.  He would like to put this off until a little bit later in his spring.  In the meantime we have discussed general knee rehab we will have him come back at his convenience to schedule his surgery into his preop paperwork.  Follow up with us otherwise can be as needed.  We have had a lengthy discussion today concerning postoperative convalescence.        Past Medical History:   Diagnosis Date    Depression     Diabetes mellitus      Hyperlipidemia     Hypertensive retinopathy of both eyes 11/20/2023     Past Surgical History:   Procedure Laterality Date    CATARACT EXTRACTION W/  INTRAOCULAR LENS IMPLANT Left 2/7/2024    Procedure: CEIOL OS;  Surgeon: Donato Godoy MD;  Location: [facility] OR;  Service: Ophthalmology;  Laterality: Left;    CATARACT EXTRACTION W/  INTRAOCULAR LENS IMPLANT Right 3/13/2024    Procedure: CEIOL OD;  Surgeon: Donato Godoy MD;  Location: [facility] OR;  Service: Ophthalmology;  Laterality: Right;    CHOLECYSTECTOMY      COLONOSCOPY  2018    RTC in 5 years. done in [place]    TONSILLECTOMY      UMBILICAL HERNIA REPAIR           Current Outpatient Medications:     alcohol swabs (ALCOHOL PREP) PadM, Apply 2 each topically once daily., Disp: 200 each, Rfl: 3    blood sugar diagnostic (ACCU-CHEK GUIDE TEST STRIPS) Strp, Inject 2 each into the skin 2 (two) times a day., Disp: 200 strip, Rfl: 3    blood-glucose meter (ACCU-CHEK GUIDE ME GLUCOSE MTR) Misc, Inject 1 each into the skin once daily., Disp: 1 each, Rfl: 0    empagliflozin (JARDIANCE) 10 mg tablet, Take 1 tablet (10 mg total) by mouth once daily., Disp: 90 tablet, Rfl: 0    esomeprazole (NEXIUM) 40 MG capsule, Take 1 capsule (40 mg total) by mouth before breakfast., Disp: 90 capsule, Rfl: 0    lancets (ACCU-CHEK SOFTCLIX LANCETS) Misc, Check BS twice daily., Disp: 200 each, Rfl: 0    lisinopriL-hydrochlorothiazide (PRINZIDE,ZESTORETIC) 20-25 mg Tab, Take 1 tablet by mouth once daily., Disp: 90 tablet, Rfl: 0    meloxicam (MOBIC) 15 MG tablet, Take 1 tablet (15 mg total) by mouth once daily., Disp: 90 tablet, Rfl: 0    rosuvastatin (CRESTOR) 5 MG tablet, Take 1 tablet (5 mg total) by mouth every evening., Disp: 90 tablet, Rfl: 0    sertraline (ZOLOFT) 50 MG tablet, , Disp: 180 tablet, Rfl: 0    Review of patient's allergies indicates:  No Known Allergies    Family History   Problem Relation Name Age of Onset    ALS Mother      Heart disease Father      Heart  disease Sister       Social History     Occupational History    Not on file   Tobacco Use    Smoking status: Former     Current packs/day: 0.00     Types: Cigarettes     Quit date:      Years since quittin.0    Smokeless tobacco: Never    Tobacco comments:     Quit  35 years ago   Substance and Sexual Activity    Alcohol use: Yes    Drug use: Never    Sexual activity: Yes     Partners: Female

## 2025-02-14 ENCOUNTER — OFFICE VISIT (OUTPATIENT)
Dept: ORTHOPEDICS | Facility: CLINIC | Age: 75
End: 2025-02-14
Payer: MEDICARE

## 2025-02-14 ENCOUNTER — PATIENT MESSAGE (OUTPATIENT)
Dept: ORTHOPEDICS | Facility: CLINIC | Age: 75
End: 2025-02-14

## 2025-02-14 VITALS — HEIGHT: 69 IN | WEIGHT: 251.13 LBS | BODY MASS INDEX: 37.2 KG/M2

## 2025-02-14 DIAGNOSIS — Z01.818 PRE-OP TESTING: ICD-10-CM

## 2025-02-14 DIAGNOSIS — M17.11 PRIMARY OSTEOARTHRITIS OF RIGHT KNEE: Primary | ICD-10-CM

## 2025-02-14 PROCEDURE — 99999 PR PBB SHADOW E&M-EST. PATIENT-LVL III: CPT | Mod: PBBFAC,,, | Performed by: ORTHOPAEDIC SURGERY

## 2025-02-14 RX ORDER — MUPIROCIN 20 MG/G
OINTMENT TOPICAL
OUTPATIENT
Start: 2025-02-14

## 2025-02-14 NOTE — PROGRESS NOTES
Subjective:      Patient ID: Ciaran Canchola is a 74 y.o. male.    Chief Complaint: Pain of the Right Knee (Surgery Consents)    HPI  Patient follow up on his right knee.  He has given this consideration over the last several months with the ongoing and progressive knee pain that is limiting activities in desires total knee replacement.  He only got short-term improvement with the previous injections etc..  ROS      Objective:    Ortho Exam     Constitutional:   Patient is alert  and oriented in no acute distress  HEENT:  normocephalic atraumatic; PERRL EOMI  Neck:  Supple without adenopathy  Cardiovascular:  Normal rate and rhythm  Pulmonary:  Normal respiratory effort normal chest wall expansion  Abdominal:  Nonprotuberant nondistended  Musculoskeletal:  Mildly antalgic gait  Diffuse crepitus and joint line tenderness with range of motion  Has difficulty with full active knee extension  No gross instability or effusion no increased warmth or erythema adequate active motor strength  Neurological:  No focal defect; cranial nerves 2-12 grossly intact  Psychiatric/behavioral:  Mood and behavior normal           My Radiographs Findings:    Patient has severe grade 4 degenerative changes with joint space narrowing subchondral sclerosis and osteophyte formation  Assessment:       Encounter Diagnosis   Name Primary?    Primary osteoarthritis of right knee Yes         Plan:       I have discussed medical condition and treatment options with the patient at length.  After review of radiographs and previous significant treatment that is included relative rest activity restrictions NSAIDs injections and physical therapy up at the think at this point they are a candidate for consideration for right total knee replacement.  I have discussed indications alternatives and potential complications of the planned right  total knee replacement including but not limited to bleeding infection damage to neurovascular structures  ongoing pain joint instability or joint stiffness hardware failure and fracture and need for further surgery as well as other medical and anesthetic complications such as cardiopulmonary events and or blood clots.  Patient expresses understanding and agrees to proceed.  Today the patient has history and physical were accomplished and although their questions were answered in layman's terms they could understand.  We will set this up at their convenience and proceed after appropriate medical and anesthetic clearance.  We will see him back postop sooner if there is any questions or problems.    The mobility limitation cannot be sufficiently resolved by the use of a cane. Patient's functional mobility deficit can be sufficiently resolved with the use of a (Rolling Walker or Walker). Patient's mobility limitation significantly impairs their ability to participate in one of more activities of daily living. The use of a (RW or Walker) will significantly improve the patient's ability to participate in MRADLS and the patient will use it on regular basis in the home.     Past Medical History:   Diagnosis Date    Depression     Diabetes mellitus     Hyperlipidemia     Hypertensive retinopathy of both eyes 11/20/2023     Past Surgical History:   Procedure Laterality Date    CATARACT EXTRACTION W/  INTRAOCULAR LENS IMPLANT Left 2/7/2024    Procedure: CEIOL OS;  Surgeon: Donato Godoy MD;  Location: [facility] OR;  Service: Ophthalmology;  Laterality: Left;    CATARACT EXTRACTION W/  INTRAOCULAR LENS IMPLANT Right 3/13/2024    Procedure: CEIOL OD;  Surgeon: Donato Godoy MD;  Location: [facility] OR;  Service: Ophthalmology;  Laterality: Right;    CHOLECYSTECTOMY      COLONOSCOPY  2018    RTC in 5 years. done in [place]    TONSILLECTOMY      UMBILICAL HERNIA REPAIR           Current Outpatient Medications:     alcohol swabs (ALCOHOL PREP) PadM, Apply 2 each topically once daily., Disp: 200 each, Rfl: 3    blood sugar  diagnostic (ACCU-CHEK GUIDE TEST STRIPS) Strp, Inject 2 each into the skin 2 (two) times a day., Disp: 200 strip, Rfl: 3    blood-glucose meter (ACCU-CHEK GUIDE ME GLUCOSE MTR) Misc, Inject 1 each into the skin once daily., Disp: 1 each, Rfl: 0    empagliflozin (JARDIANCE) 10 mg tablet, Take 1 tablet (10 mg total) by mouth once daily., Disp: 90 tablet, Rfl: 0    esomeprazole (NEXIUM) 40 MG capsule, Take 1 capsule (40 mg total) by mouth before breakfast., Disp: 90 capsule, Rfl: 0    lancets (ACCU-CHEK SOFTCLIX LANCETS) Misc, Check BS twice daily., Disp: 200 each, Rfl: 0    lisinopriL-hydrochlorothiazide (PRINZIDE,ZESTORETIC) 20-25 mg Tab, Take 1 tablet by mouth once daily., Disp: 90 tablet, Rfl: 0    meloxicam (MOBIC) 15 MG tablet, Take 1 tablet (15 mg total) by mouth once daily., Disp: 90 tablet, Rfl: 0    rosuvastatin (CRESTOR) 5 MG tablet, Take 1 tablet (5 mg total) by mouth every evening., Disp: 90 tablet, Rfl: 0    sertraline (ZOLOFT) 50 MG tablet, , Disp: 180 tablet, Rfl: 0    Review of patient's allergies indicates:  No Known Allergies    Family History   Problem Relation Name Age of Onset    ALS Mother      Heart disease Father      Heart disease Sister       Social History     Occupational History    Not on file   Tobacco Use    Smoking status: Former     Current packs/day: 0.00     Types: Cigarettes     Quit date:      Years since quittin.1    Smokeless tobacco: Never    Tobacco comments:     Quit  35 years ago   Substance and Sexual Activity    Alcohol use: Yes    Drug use: Never    Sexual activity: Yes     Partners: Female

## 2025-02-14 NOTE — H&P (VIEW-ONLY)
Subjective:      Patient ID: Ciaran Canchola is a 74 y.o. male.    Chief Complaint: Pain of the Right Knee (Surgery Consents)    HPI  Patient follow up on his right knee.  He has given this consideration over the last several months with the ongoing and progressive knee pain that is limiting activities in desires total knee replacement.  He only got short-term improvement with the previous injections etc..  ROS      Objective:    Ortho Exam     Constitutional:   Patient is alert  and oriented in no acute distress  HEENT:  normocephalic atraumatic; PERRL EOMI  Neck:  Supple without adenopathy  Cardiovascular:  Normal rate and rhythm  Pulmonary:  Normal respiratory effort normal chest wall expansion  Abdominal:  Nonprotuberant nondistended  Musculoskeletal:  Mildly antalgic gait  Diffuse crepitus and joint line tenderness with range of motion  Has difficulty with full active knee extension  No gross instability or effusion no increased warmth or erythema adequate active motor strength  Neurological:  No focal defect; cranial nerves 2-12 grossly intact  Psychiatric/behavioral:  Mood and behavior normal           My Radiographs Findings:    Patient has severe grade 4 degenerative changes with joint space narrowing subchondral sclerosis and osteophyte formation  Assessment:       Encounter Diagnosis   Name Primary?    Primary osteoarthritis of right knee Yes         Plan:       I have discussed medical condition and treatment options with the patient at length.  After review of radiographs and previous significant treatment that is included relative rest activity restrictions NSAIDs injections and physical therapy up at the think at this point they are a candidate for consideration for right total knee replacement.  I have discussed indications alternatives and potential complications of the planned right  total knee replacement including but not limited to bleeding infection damage to neurovascular structures  ongoing pain joint instability or joint stiffness hardware failure and fracture and need for further surgery as well as other medical and anesthetic complications such as cardiopulmonary events and or blood clots.  Patient expresses understanding and agrees to proceed.  Today the patient has history and physical were accomplished and although their questions were answered in layman's terms they could understand.  We will set this up at their convenience and proceed after appropriate medical and anesthetic clearance.  We will see him back postop sooner if there is any questions or problems.    The mobility limitation cannot be sufficiently resolved by the use of a cane. Patient's functional mobility deficit can be sufficiently resolved with the use of a (Rolling Walker or Walker). Patient's mobility limitation significantly impairs their ability to participate in one of more activities of daily living. The use of a (RW or Walker) will significantly improve the patient's ability to participate in MRADLS and the patient will use it on regular basis in the home.     Past Medical History:   Diagnosis Date    Depression     Diabetes mellitus     Hyperlipidemia     Hypertensive retinopathy of both eyes 11/20/2023     Past Surgical History:   Procedure Laterality Date    CATARACT EXTRACTION W/  INTRAOCULAR LENS IMPLANT Left 2/7/2024    Procedure: CEIOL OS;  Surgeon: Donato Godoy MD;  Location: [facility] OR;  Service: Ophthalmology;  Laterality: Left;    CATARACT EXTRACTION W/  INTRAOCULAR LENS IMPLANT Right 3/13/2024    Procedure: CEIOL OD;  Surgeon: Donato Godoy MD;  Location: [facility] OR;  Service: Ophthalmology;  Laterality: Right;    CHOLECYSTECTOMY      COLONOSCOPY  2018    RTC in 5 years. done in [place]    TONSILLECTOMY      UMBILICAL HERNIA REPAIR           Current Outpatient Medications:     alcohol swabs (ALCOHOL PREP) PadM, Apply 2 each topically once daily., Disp: 200 each, Rfl: 3    blood sugar  diagnostic (ACCU-CHEK GUIDE TEST STRIPS) Strp, Inject 2 each into the skin 2 (two) times a day., Disp: 200 strip, Rfl: 3    blood-glucose meter (ACCU-CHEK GUIDE ME GLUCOSE MTR) Misc, Inject 1 each into the skin once daily., Disp: 1 each, Rfl: 0    empagliflozin (JARDIANCE) 10 mg tablet, Take 1 tablet (10 mg total) by mouth once daily., Disp: 90 tablet, Rfl: 0    esomeprazole (NEXIUM) 40 MG capsule, Take 1 capsule (40 mg total) by mouth before breakfast., Disp: 90 capsule, Rfl: 0    lancets (ACCU-CHEK SOFTCLIX LANCETS) Misc, Check BS twice daily., Disp: 200 each, Rfl: 0    lisinopriL-hydrochlorothiazide (PRINZIDE,ZESTORETIC) 20-25 mg Tab, Take 1 tablet by mouth once daily., Disp: 90 tablet, Rfl: 0    meloxicam (MOBIC) 15 MG tablet, Take 1 tablet (15 mg total) by mouth once daily., Disp: 90 tablet, Rfl: 0    rosuvastatin (CRESTOR) 5 MG tablet, Take 1 tablet (5 mg total) by mouth every evening., Disp: 90 tablet, Rfl: 0    sertraline (ZOLOFT) 50 MG tablet, , Disp: 180 tablet, Rfl: 0    Review of patient's allergies indicates:  No Known Allergies    Family History   Problem Relation Name Age of Onset    ALS Mother      Heart disease Father      Heart disease Sister       Social History     Occupational History    Not on file   Tobacco Use    Smoking status: Former     Current packs/day: 0.00     Types: Cigarettes     Quit date:      Years since quittin.1    Smokeless tobacco: Never    Tobacco comments:     Quit  35 years ago   Substance and Sexual Activity    Alcohol use: Yes    Drug use: Never    Sexual activity: Yes     Partners: Female

## 2025-02-27 DIAGNOSIS — E78.2 MIXED HYPERLIPIDEMIA: ICD-10-CM

## 2025-02-27 DIAGNOSIS — E11.65 TYPE 2 DIABETES MELLITUS WITH HYPERGLYCEMIA, WITHOUT LONG-TERM CURRENT USE OF INSULIN: ICD-10-CM

## 2025-02-27 RX ORDER — LANCETS
EACH MISCELLANEOUS
Qty: 200 EACH | Refills: 3 | Status: SHIPPED | OUTPATIENT
Start: 2025-02-27

## 2025-02-27 RX ORDER — ROSUVASTATIN CALCIUM 5 MG/1
5 TABLET, COATED ORAL NIGHTLY
Qty: 90 TABLET | Refills: 2 | Status: SHIPPED | OUTPATIENT
Start: 2025-02-27

## 2025-02-27 RX ORDER — EMPAGLIFLOZIN 10 MG/1
10 TABLET, FILM COATED ORAL
Qty: 90 TABLET | Refills: 0 | Status: SHIPPED | OUTPATIENT
Start: 2025-02-27

## 2025-02-27 NOTE — TELEPHONE ENCOUNTER
Provider Staff:  Action required for this patient    Requires labs      Please see care gap opportunities below in Care Due Message.    Thanks!  [facility]     Appointments      Date Provider   Last Visit   11/20/2024 Obie Argueta MD   Next Visit   5/20/2025 Obie Argueta MD     Refill Decision Note   Ciaran Canchola  is requesting a refill authorization.  Brief Assessment and Rationale for Refill:  Approve     Medication Therapy Plan:        Comments:     Note composed:9:13 AM 02/27/2025

## 2025-02-27 NOTE — TELEPHONE ENCOUNTER
Care Due:                  Date            Visit Type   Department     Provider  --------------------------------------------------------------------------------                                             [facility] [place]                              [facility]   [address]  Last Visit: 11-      PATIENT      FAMILY Amandajoslyn  Ellie                               -         [facility]      [address]  Next Visit: 05-      CARE ([facility])   [facility] Anne Argueta                                                            Last  Test          Frequency    Reason                     Performed    Due Date  --------------------------------------------------------------------------------    CBC.........  12 months..  meloxicam................  Not Found    Overdue    HBA1C.......  6 months...  empagliflozin............  11- 05-    [facility] Embedded Care Due Messages. Reference number: [id number].   2/27/2025 2:19:54 AM CST

## 2025-03-05 ENCOUNTER — HOSPITAL ENCOUNTER (OUTPATIENT)
Dept: RADIOLOGY | Facility: HOSPITAL | Age: 75
Discharge: HOME | End: 2025-03-05
Attending: ORTHOPAEDIC SURGERY
Payer: MEDICARE

## 2025-03-05 ENCOUNTER — HOSPITAL ENCOUNTER (OUTPATIENT)
Dept: PREADMISSION TESTING | Facility: HOSPITAL | Age: 75
Discharge: HOME | End: 2025-03-05
Attending: ORTHOPAEDIC SURGERY
Payer: MEDICARE

## 2025-03-05 DIAGNOSIS — M17.11 PRIMARY OSTEOARTHRITIS OF RIGHT KNEE: Primary | ICD-10-CM

## 2025-03-05 DIAGNOSIS — M17.11 PRIMARY OSTEOARTHRITIS OF RIGHT KNEE: ICD-10-CM

## 2025-03-05 DIAGNOSIS — Z01.818 PRE-OP TESTING: Primary | ICD-10-CM

## 2025-03-05 LAB
ANION GAP SERPL CALC-SCNC: 10 MMOL/L (ref 8–16)
BASOPHILS # BLD AUTO: 0.03 K/UL (ref 0–0.2)
BASOPHILS NFR BLD: 0.4 % (ref 0–1.9)
BUN SERPL-MCNC: 19 MG/DL (ref 8–23)
CALCIUM SERPL-MCNC: 9.5 MG/DL (ref 8.7–10.5)
CHLORIDE SERPL-SCNC: 103 MMOL/L (ref 95–110)
CO2 SERPL-SCNC: 24 MMOL/L (ref 23–29)
CREAT SERPL-MCNC: 1.1 MG/DL (ref 0.5–1.4)
DIFFERENTIAL METHOD BLD: ABNORMAL
EOSINOPHIL # BLD AUTO: 0.3 K/UL (ref 0–0.5)
EOSINOPHIL NFR BLD: 4.1 % (ref 0–8)
ERYTHROCYTE [DISTWIDTH] IN BLOOD BY AUTOMATED COUNT: 12.9 % (ref 11.5–14.5)
EST. GFR  (NO RACE VARIABLE): >60 ML/MIN/1.73 M^2
GLUCOSE SERPL-MCNC: 111 MG/DL (ref 70–110)
HCT VFR BLD AUTO: 40 % (ref 40–54)
HGB BLD-MCNC: 13.4 G/DL (ref 14–18)
IMM GRANULOCYTES # BLD AUTO: 0.05 K/UL (ref 0–0.04)
IMM GRANULOCYTES NFR BLD AUTO: 0.6 % (ref 0–0.5)
LYMPHOCYTES # BLD AUTO: 1.6 K/UL (ref 1–4.8)
LYMPHOCYTES NFR BLD: 20 % (ref 18–48)
MCH RBC QN AUTO: 30.4 PG (ref 27–31)
MCHC RBC AUTO-ENTMCNC: 33.5 G/DL (ref 32–36)
MCV RBC AUTO: 91 FL (ref 82–98)
MONOCYTES # BLD AUTO: 0.6 K/UL (ref 0.3–1)
MONOCYTES NFR BLD: 7.7 % (ref 4–15)
MRSA SCREEN BY PCR: NOT DETECTED
NEUTROPHILS # BLD AUTO: 5.4 K/UL (ref 1.8–7.7)
NEUTROPHILS NFR BLD: 67.2 % (ref 38–73)
NRBC BLD-RTO: 0 /100 WBC
OHS QRS DURATION: 106 MS
OHS QTC CALCULATION: 440 MS
PLATELET # BLD AUTO: 273 K/UL (ref 150–450)
PMV BLD AUTO: 10.3 FL (ref 9.2–12.9)
POTASSIUM SERPL-SCNC: 4 MMOL/L (ref 3.5–5.1)
RBC # BLD AUTO: 4.41 M/UL (ref 4.6–6.2)
SODIUM SERPL-SCNC: 137 MMOL/L (ref 136–145)
WBC # BLD AUTO: 8.03 K/UL (ref 3.9–12.7)

## 2025-03-05 PROCEDURE — 80048 BASIC METABOLIC PNL TOTAL CA: CPT | Performed by: ORTHOPAEDIC SURGERY

## 2025-03-05 PROCEDURE — 85025 COMPLETE CBC W/AUTO DIFF WBC: CPT | Performed by: ORTHOPAEDIC SURGERY

## 2025-03-05 PROCEDURE — 93005 ELECTROCARDIOGRAM TRACING: CPT

## 2025-03-05 PROCEDURE — 93010 ELECTROCARDIOGRAM REPORT: CPT | Mod: ,,, | Performed by: INTERNAL MEDICINE

## 2025-03-05 PROCEDURE — 36415 COLL VENOUS BLD VENIPUNCTURE: CPT | Performed by: ORTHOPAEDIC SURGERY

## 2025-03-05 PROCEDURE — 71046 X-RAY EXAM CHEST 2 VIEWS: CPT | Mod: 26,,, | Performed by: RADIOLOGY

## 2025-03-05 PROCEDURE — 71046 X-RAY EXAM CHEST 2 VIEWS: CPT | Mod: TC

## 2025-03-05 PROCEDURE — 87641 MR-STAPH DNA AMP PROBE: CPT | Performed by: ORTHOPAEDIC SURGERY

## 2025-03-05 NOTE — PRE ADMISSION SCREENING
"               [facility] Risk Assessment Scale    Patient Name: Ciaran Canchola  YOB: 1950  MRN: [id number]            RIsk Factor Measure Recommendation Patient Data Scale/Score   BMI >40 Reconsider surgery, weight loss   Estimated body mass index is 37.08 kg/m² as calculated from the following:    Height as of 2/14/25: 5' 9" (1.753 m).    Weight as of 2/14/25: 113.9 kg (251 lb 1.7 oz).   [] 0 = 1 - 24.9  [] 1 = 25-29.9  [] 2 = 30-34.9  [x] 3 = 35-39.9  [] 4 = 40-44.9  [] 5 = 45-99.9   Hemoglobin AIC (if applicable) >9 Delay surgery until DM under control  Refer for:  Nutrition Therapy  Exercise   Medication    Lab Results   Component Value Date    HGBA1C 6.4 (H) 11/20/2024       Lab Results   Component Value Date     (H) 03/05/2025      [] 0 = 4.0-5.6  [x] 1 = 5.7-6.4  [] 2 = 6.5-6.9  [] 3 = 7.0-7.9  [] 4 = 8.0-8.9  [] 5 = 9.0-12   Hemoglobin (Anemia) <9 Delay surgery   Correct anemia Lab Results   Component Value Date    HGB 13.4 (L) 03/05/2025    [] 20 - <9.0                    Albumin <3 Delay surgery &Workup Lab Results   Component Value Date    ALBUMIN 4.0 11/20/2024    [] 20 - <3.0   Smoking Cessation >4 Weeks Delay Surgery  Refer to OP Cessation Class    Former Smoker  Quit: 1985 [] 20 - current smoker                                _____ PPD                    Hx of MI, PE, Arrhythmia, CVA, DVT <30 Days Delay Surgery    N/A [] 20      Infection Variable Delay surgery and re-evaluate   N/A [] 20 - recent/current infection     Depression (PHQ) >10 out of 27 Delay Surgery and re-evaluate  Medication  Counseling              [x] 0     []1     []2     []3      []4      [] 5                    (1-4)      (5-9)  (10-14)  (15-19)   (20-27)     Memory Impairment & Memory loss (Mini-Cog Screening Tool) Advanced dementia and/or Parkinson's Reconsider surgery     [x] 0     []1     []2     []3     []4     [] 5     Physical Conditioning (Modified AM-PAC Per Physical Therapy at [facility]) Unable to " ambulate on day of surgery Delay surgery and re-evaluate  Pre-Rehabilitation   (PT evaluation)       []  0   [x]4       []8     []12        []16     []20       (<20%)   (<40%)   (<60%)   (<80% )    (>80%)     Home Environment/Caregiver support  (Per /Navigator Interview)    Availability of basic services and/or approprate assistance during post-operative period Delay surgery and re-evaluate  Safe home environment  Health   1 week post-surgery  Transportation  availability  Ability to obtain DME/Medications post-op    [x] 0     []1     []2     []3     []4     [] 5  [x] 0     []1     []2     []3     []4     [] 5  [x] 0     []1     []2     []3     []4     [] 5  [x] 0     []1     []2     []3     []4     [] 5         MD Contact: Dr. Alonso Comments:  Total Score:  8

## 2025-03-05 NOTE — DISCHARGE INSTRUCTIONS
To confirm, Your doctor has instructed you that surgery is scheduled for: 3/12/25    Please report to [facility], Registration the morning of surgery. You must check-in and receive a wristband before going to your procedure.  [address] [place] [address] [place] [address]    Pre-Op will call the afternoon prior to surgery between 1:00 and 6:00 PM with the final arrival time.  Phone number: [phone]    PLEASE NOTE:  The surgery schedule has many variables which may affect the time of your surgery case.  Family members should be available if your surgery time changes.  Plan to be here the day of your procedure between 4-6 hours.    MEDICATIONS:  TAKE ONLY THESE MEDICATIONS WITH A SMALL SIP OF WATER THE MORNING OF YOUR PROCEDURE:    SEE MED LIST          DO NOT TAKE THESE MEDICATIONS 5-7 DAYS PRIOR to your procedure or per your surgeon's request:   ASPIRIN, ALEVE, ADVIL, IBUPROFEN, FISH OIL VITAMIN E, HERBALS  (May take Tylenol)    ONLY if you are prescribed any types of blood thinners such as:  Aspirin, Coumadin, Plavix, Pradaxa, Xarelto, Aggrenox, Effient, Eliquis, Savasya, Brilinta, or any other, ask your surgeon whether you should stop taking them and how long before surgery you should stop.  You may also need to verify with the prescribing physician if it is ok to stop your medication.      INSTRUCTIONS IMPORTANT!!  Do not eat or drink anything between midnight and the time of your procedure- this includes gum, mints, and candy.  EXCEPT: you may have clear liquids such as:  WATER, BLACK COFFEE, UNSWEET TEA, OR GATORADE (NO RED OR PURPLE) UP TO 2 HOURS PRIOR TO YOUR ARRIVAL TIME.  Do not smoke or drink alcoholic beverages 24 hours prior to your procedure.  Shower the night before AND the morning of your procedure with a Chlorhexidine wash such as Hibiclens or Dial antibacterial soap from the neck down.  Do not get it on your face or in your eyes.  You may use your own shampoo and face wash. This  helps your skin to be as bacteria free as possible.    If you wear contact lenses, dentures, hearing aids or glasses, bring a container to put them in during surgery and give to a family member for safe keeping.  Please leave all jewelry, piercing's and valuables at home. You must remove your false eyelashes prior to surgery.    DO NOT remove hair from the surgery site.  Do not shave the incision site unless you are given specific instructions to do so.    ONLY if you have been diagnosed with sleep apnea please bring your C-PAP machine.  ONLY if you wear home oxygen please bring your portable oxygen tank the day of your procedure.  ONLY if you have a history of OPEN HEART SURGERY you will need a clearance from your Cardiologist per Anesthesia.      ONLY for patients requiring bowel prep, written instructions will be given by your doctor's office.  ONLY if you have a neuro stimulator, please bring the controller with you the morning of surgery  ONLY if a type and screen test is needed before surgery, please return:  If your doctor has scheduled you for an overnight stay, bring a small overnight bag with any personal items you need.  Make arrangements in advance for transportation home by a responsible adult. You can not go home in an uber or a cab per hospital policy.  It is not safe to drive a vehicle during the 24 hours after anesthesia.          All  facilities and properties are tobacco free.  Smoking is NOT allowed.   If you have any questions about these instructions, call Pre-Op Admit  Nursing at [phone] or the Pre-Op Day Surgery Unit at [phone].

## 2025-03-05 NOTE — PRE ADMISSION SCREENING
JOINT CAMP ASSESSMENT    Name Ciaran Canchola   MRN [id number]    Age/Sex 74 y.o. male    Surgeon Dr. Tay Alonso   Joint Camp Date 3/5/2025   Surgery Date 3/12/2025   Procedure Right Knee Arthroplasty   Insurance Payor: HUMANA MANAGED MEDICARE / Plan: HU[employer]NA MEDICARE PPO / Product Type: Medicare Advantage /    Care Team Patient Care Team:  Obie Argueta MD as PCP - General (Family Medicine)  Andrea Sewell MD as Consulting Physician (Family Medicine)  20/20, [facility] (Optometry)  Jesse Bailey MD as Consulting Physician (Gastroenterology)    Pharmacy   [id number] - [place], [place] - [address] AT [facility] [address]  [address]  [place] [address]  Phone: [phone] Fax: [phone]    [facility]  [address]  [facility] [address]  Phone: [phone] Fax: [phone]    [facility] - [place], [place] - [address]  [address] [facility] [address]  Phone: [phone] Fax: [phone]     AM-PAC Score   22   Risk Assessment Score 8     Past Medical History:   Diagnosis Date    Depression     Diabetes mellitus     Hyperlipidemia     Hypertension     Hypertensive retinopathy of both eyes 11/20/2023       Past Surgical History:   Procedure Laterality Date    CATARACT EXTRACTION W/  INTRAOCULAR LENS IMPLANT Left 2/7/2024    Procedure: CEIOL OS;  Surgeon: Donato Godoy MD;  Location: [facility] OR;  Service: Ophthalmology;  Laterality: Left;    CATARACT EXTRACTION W/  INTRAOCULAR LENS IMPLANT Right 3/13/2024    Procedure: CEIOL OD;  Surgeon: Donato Godoy MD;  Location: [facility] OR;  Service: Ophthalmology;  Laterality: Right;    CHOLECYSTECTOMY      COLONOSCOPY  2018    RTC in 5 years. done in [place]    TONSILLECTOMY      UMBILICAL HERNIA REPAIR           Home Enviroment     Living Arrangement: Lives with spouse  Home Environment: 1-story house/ trailer, number of outside stairs: 1,  walk-in shower  Home Safety Concerns: Pets in the home: dogs (2).    DISCHARGE CAREGIVER/SUPPORT SYSTEM     Identified post-op caregiver: Patient has spouse / significant other.  Patient's caregiver(s) will be able to provide physical assistance. Patient will have someone to assist overnight.      Caregiver present at pre-op interview:  No      PRE-OPERATIVE FUNCTIONAL STATUS     Employment: Retired    Pre-op Functional Status: Patient is independent with mobility/ambulation, transfers, ADL's, IADL's.    Use of assistive device for ambulation: none  ADL: self care  ADL Limitations: difficulty with walking  Medical Restrictions: Unstable ambulation and Decreased range of motions in extremities    POTENTIAL BARRIERS TO DISCHARGE/POTENTIAL POST-OP COMPLICATIONS     Patient with hx of diabetes mellitus, HTN. POSSIBLE SAME DAY DISCHARGE.    DISCHARGE PLAN     Expected LOS of 1 days or less for joint replacement discussed with patient. We also discussed a discharge path of HH for approximately two weeks with a transition to outpatient PT on the third week given no post-op complications.      Patient in agreement with discharge plan: Yes    Discharge to: Discharge home with home health (PT/OT) x2 weeks with transition to outpatient PT     HH:  [facility] ([place]  Patient disclosure form completed and sent to case management for upload to the medical record.      OP PT: [facility] ([place])     Home DME: none    Needed DME at D/C: rolling walker and bedside commode. Patient to purchase BSC from retail prior to surgery.     Rx: Per Dr. Alonso at discharge     Meds to Beds: Yes  Patient expected to discharge on Aspirin 325 mg by mouth twice daily for DVT prophylaxis.

## 2025-03-05 NOTE — PRE ADMISSION SCREENING
Patient Name: Ciaran Canchola  YOB: 1950   MRN: [id number]     [facility]   Basic Mobility Inpatient Short Form 6 Clicks         How much difficulty does the patient currently have  Unable  A Lot  A Little  None      1. Turning over in bed (including adjusting bedclothes, sheets and blankets)?     1 []    2 []    3 [x]    4 []        2. Sitting down on and standing up from a chair with arms (e.g., wheelchair, bedside commode, etc.)     1 []  2 []  3 [x]     4 []      3. Moving from lying on back to sitting on the side of the bed?     1 []  2 []  3 []    4 [x]    How much help from another person does the patient currently need  Total  A Lot  A Little  None      4. Moving to and from a bed to a chair (including a wheelchair)?    1 []  2 []  3 []    4 [x]      5. Need to walk in hospital room?    1 []  2 []  3 []    4 [x]      6. Climbing 3-5 steps with a railing?    1 []  2 []  3 []    4 [x]       Raw Score:       22           CMS 0-100% Score:    20.91        %   Standardized Score:     53.28          CMS Modifier:      CJ                                    [facility]   Basic Mobility Inpatient Short Form 6 Clicks Score Conversion Table*         *Use this form to convert -PAC Basic Mobility Inpatient Raw Scores.   -[facility] Inpatient Basic Mobility Short Form Scoring Example   1. Add the number values associated with the response to each item. For example, items totals yield a Raw Score of 21.   2. Match the raw score to the t-Scale scores (t-Scale score = 50.25, SE = 4.69).   3. Find the associated CMS % (CMS % = 28.97%).   4. Locate the correct CMS Functional Modifier Code, or G Code (G code = CJ)     NOTE: Each -PAC Short Form has a separate conversion table. Make sure that you use the correct conversion table.       Instruction Manual - page 45 contains conversion table

## 2025-03-06 ENCOUNTER — ANESTHESIA EVENT (OUTPATIENT)
Dept: SURGERY | Facility: HOSPITAL | Age: 75
End: 2025-03-06
Payer: MEDICARE

## 2025-03-07 DIAGNOSIS — Z96.651 S/P TOTAL KNEE ARTHROPLASTY, RIGHT: Primary | ICD-10-CM

## 2025-03-08 DIAGNOSIS — M19.90 ARTHRITIS: ICD-10-CM

## 2025-03-10 RX ORDER — MELOXICAM 15 MG/1
TABLET ORAL
Qty: 90 TABLET | Refills: 0 | Status: SHIPPED | OUTPATIENT
Start: 2025-03-10

## 2025-03-12 ENCOUNTER — ANESTHESIA (OUTPATIENT)
Dept: SURGERY | Facility: HOSPITAL | Age: 75
End: 2025-03-12
Payer: MEDICARE

## 2025-03-12 ENCOUNTER — HOSPITAL ENCOUNTER (OUTPATIENT)
Facility: HOSPITAL | Age: 75
Discharge: HOME | End: 2025-03-12
Attending: ORTHOPAEDIC SURGERY | Admitting: ORTHOPAEDIC SURGERY
Payer: MEDICARE

## 2025-03-12 DIAGNOSIS — Z01.818 PRE-OP TESTING: ICD-10-CM

## 2025-03-12 DIAGNOSIS — M17.11 PRIMARY OSTEOARTHRITIS OF RIGHT KNEE: ICD-10-CM

## 2025-03-12 PROCEDURE — 94761 N-INVAS EAR/PLS OXIMETRY MLT: CPT

## 2025-03-12 PROCEDURE — 63600175 PHARM REV CODE 636 W HCPCS

## 2025-03-12 PROCEDURE — 37000009 HC ANESTHESIA EA ADD 15 MINS: Performed by: ORTHOPAEDIC SURGERY

## 2025-03-12 PROCEDURE — 71000033 HC RECOVERY, INTIAL HOUR: Performed by: ORTHOPAEDIC SURGERY

## 2025-03-12 PROCEDURE — 25000003 PHARM REV CODE 250: Performed by: NURSE ANESTHETIST, CERTIFIED REGISTERED

## 2025-03-12 PROCEDURE — 71000016 HC POSTOP RECOV ADDL HR: Performed by: ORTHOPAEDIC SURGERY

## 2025-03-12 PROCEDURE — 63600175 PHARM REV CODE 636 W HCPCS: Mod: JZ,TB | Performed by: ANESTHESIOLOGY

## 2025-03-12 PROCEDURE — 25000003 PHARM REV CODE 250: Performed by: ANESTHESIOLOGY

## 2025-03-12 PROCEDURE — 27200665 HC NERVE BLOCK NEEDLE/ CATHETER: Performed by: ANESTHESIOLOGY

## 2025-03-12 PROCEDURE — 27000221 HC OXYGEN, UP TO 24 HOURS

## 2025-03-12 PROCEDURE — 36000711: Performed by: ORTHOPAEDIC SURGERY

## 2025-03-12 PROCEDURE — 71000015 HC POSTOP RECOV 1ST HR: Performed by: ORTHOPAEDIC SURGERY

## 2025-03-12 PROCEDURE — 94799 UNLISTED PULMONARY SVC/PX: CPT

## 2025-03-12 PROCEDURE — 97110 THERAPEUTIC EXERCISES: CPT

## 2025-03-12 PROCEDURE — 64447 NJX AA&/STRD FEMORAL NRV IMG: CPT | Performed by: ANESTHESIOLOGY

## 2025-03-12 PROCEDURE — C1713 ANCHOR/SCREW BN/BN,TIS/BN: HCPCS | Performed by: ORTHOPAEDIC SURGERY

## 2025-03-12 PROCEDURE — C1776 JOINT DEVICE (IMPLANTABLE): HCPCS | Performed by: ORTHOPAEDIC SURGERY

## 2025-03-12 PROCEDURE — 27447 TOTAL KNEE ARTHROPLASTY: CPT | Mod: RT,,, | Performed by: ORTHOPAEDIC SURGERY

## 2025-03-12 PROCEDURE — 94760 N-INVAS EAR/PLS OXIMETRY 1: CPT

## 2025-03-12 PROCEDURE — 63600175 PHARM REV CODE 636 W HCPCS: Performed by: NURSE ANESTHETIST, CERTIFIED REGISTERED

## 2025-03-12 PROCEDURE — 97116 GAIT TRAINING THERAPY: CPT

## 2025-03-12 PROCEDURE — 25000003 PHARM REV CODE 250: Performed by: ORTHOPAEDIC SURGERY

## 2025-03-12 PROCEDURE — 97161 PT EVAL LOW COMPLEX 20 MIN: CPT

## 2025-03-12 PROCEDURE — 63600175 PHARM REV CODE 636 W HCPCS: Performed by: ORTHOPAEDIC SURGERY

## 2025-03-12 PROCEDURE — 63600175 PHARM REV CODE 636 W HCPCS: Performed by: ANESTHESIOLOGY

## 2025-03-12 PROCEDURE — 27200688 HC TRAY, SPINAL-HYPER/ ISOBARIC: Performed by: ANESTHESIOLOGY

## 2025-03-12 PROCEDURE — 27201423 OPTIME MED/SURG SUP & DEVICES STERILE SUPPLY: Performed by: ORTHOPAEDIC SURGERY

## 2025-03-12 PROCEDURE — 71000039 HC RECOVERY, EACH ADD'L HOUR: Performed by: ORTHOPAEDIC SURGERY

## 2025-03-12 PROCEDURE — 36000710: Performed by: ORTHOPAEDIC SURGERY

## 2025-03-12 PROCEDURE — 37000008 HC ANESTHESIA 1ST 15 MINUTES: Performed by: ORTHOPAEDIC SURGERY

## 2025-03-12 DEVICE — IMPLANTABLE DEVICE: Type: IMPLANTABLE DEVICE | Site: KNEE | Status: FUNCTIONAL

## 2025-03-12 DEVICE — TIBIAL TRAY CRUCIATE 75MM: Type: IMPLANTABLE DEVICE | Site: KNEE | Status: FUNCTIONAL

## 2025-03-12 DEVICE — PATELLA COMPONENT 3 PEG 31X8MM: Type: IMPLANTABLE DEVICE | Site: KNEE | Status: FUNCTIONAL

## 2025-03-12 DEVICE — CEMENT BONE SIMPLEX HV RADPQ: Type: IMPLANTABLE DEVICE | Site: KNEE | Status: FUNCTIONAL

## 2025-03-12 RX ORDER — PHENYLEPHRINE HYDROCHLORIDE 10 MG/ML
INJECTION INTRAVENOUS
Status: DISCONTINUED | OUTPATIENT
Start: 2025-03-12 | End: 2025-03-12

## 2025-03-12 RX ORDER — FENTANYL CITRATE 50 UG/ML
25 INJECTION, SOLUTION INTRAMUSCULAR; INTRAVENOUS EVERY 5 MIN PRN
Status: DISCONTINUED | OUTPATIENT
Start: 2025-03-12 | End: 2025-03-12 | Stop reason: HOSPADM

## 2025-03-12 RX ORDER — DOCUSATE SODIUM 100 MG/1
100 CAPSULE, LIQUID FILLED ORAL EVERY 12 HOURS
Status: CANCELLED | OUTPATIENT
Start: 2025-03-12

## 2025-03-12 RX ORDER — OXYCODONE HCL 10 MG/1
10 TABLET, FILM COATED, EXTENDED RELEASE ORAL ONCE
Status: COMPLETED | OUTPATIENT
Start: 2025-03-12 | End: 2025-03-12

## 2025-03-12 RX ORDER — ONDANSETRON HYDROCHLORIDE 2 MG/ML
4 INJECTION, SOLUTION INTRAVENOUS ONCE AS NEEDED
Status: DISCONTINUED | OUTPATIENT
Start: 2025-03-12 | End: 2025-03-12 | Stop reason: HOSPADM

## 2025-03-12 RX ORDER — BUPIVACAINE HYDROCHLORIDE 5 MG/ML
INJECTION, SOLUTION EPIDURAL; INTRACAUDAL; PERINEURAL
Status: COMPLETED | OUTPATIENT
Start: 2025-03-12 | End: 2025-03-12

## 2025-03-12 RX ORDER — ONDANSETRON HYDROCHLORIDE 2 MG/ML
4 INJECTION, SOLUTION INTRAVENOUS EVERY 12 HOURS PRN
Status: CANCELLED | OUTPATIENT
Start: 2025-03-12

## 2025-03-12 RX ORDER — MORPHINE SULFATE 2 MG/ML
2 INJECTION, SOLUTION INTRAMUSCULAR; INTRAVENOUS EVERY 4 HOURS PRN
Refills: 0 | Status: CANCELLED | OUTPATIENT
Start: 2025-03-12

## 2025-03-12 RX ORDER — ONDANSETRON HYDROCHLORIDE 2 MG/ML
INJECTION, SOLUTION INTRAVENOUS
Status: DISCONTINUED | OUTPATIENT
Start: 2025-03-12 | End: 2025-03-12

## 2025-03-12 RX ORDER — TRANEXAMIC ACID 100 MG/ML
INJECTION, SOLUTION INTRAVENOUS
Status: DISCONTINUED | OUTPATIENT
Start: 2025-03-12 | End: 2025-03-12

## 2025-03-12 RX ORDER — CELECOXIB 100 MG/1
200 CAPSULE ORAL ONCE
Status: COMPLETED | OUTPATIENT
Start: 2025-03-12 | End: 2025-03-12

## 2025-03-12 RX ORDER — BUPIVACAINE 13.3 MG/ML
INJECTION, SUSPENSION, LIPOSOMAL INFILTRATION
Status: COMPLETED | OUTPATIENT
Start: 2025-03-12 | End: 2025-03-12

## 2025-03-12 RX ORDER — HYDROCODONE BITARTRATE AND ACETAMINOPHEN 5; 325 MG/1; MG/1
1 TABLET ORAL EVERY 6 HOURS PRN
Qty: 30 TABLET | Refills: 0 | Status: SHIPPED | OUTPATIENT
Start: 2025-03-12

## 2025-03-12 RX ORDER — ONDANSETRON 4 MG/1
8 TABLET, ORALLY DISINTEGRATING ORAL EVERY 8 HOURS PRN
Status: CANCELLED | OUTPATIENT
Start: 2025-03-12

## 2025-03-12 RX ORDER — DIPHENHYDRAMINE HYDROCHLORIDE 50 MG/ML
12.5 INJECTION, SOLUTION INTRAMUSCULAR; INTRAVENOUS EVERY 6 HOURS PRN
Status: DISCONTINUED | OUTPATIENT
Start: 2025-03-12 | End: 2025-03-12 | Stop reason: HOSPADM

## 2025-03-12 RX ORDER — MIDAZOLAM HYDROCHLORIDE 1 MG/ML
.5-4 INJECTION, SOLUTION INTRAMUSCULAR; INTRAVENOUS
Status: DISCONTINUED | OUTPATIENT
Start: 2025-03-12 | End: 2025-03-12 | Stop reason: HOSPADM

## 2025-03-12 RX ORDER — KETAMINE HCL IN 0.9 % NACL 50 MG/5 ML
SYRINGE (ML) INTRAVENOUS
Status: DISCONTINUED | OUTPATIENT
Start: 2025-03-12 | End: 2025-03-12

## 2025-03-12 RX ORDER — ENOXAPARIN SODIUM 100 MG/ML
40 INJECTION SUBCUTANEOUS
Status: DISCONTINUED | OUTPATIENT
Start: 2025-03-12 | End: 2025-03-12 | Stop reason: HOSPADM

## 2025-03-12 RX ORDER — HYDROMORPHONE HYDROCHLORIDE 2 MG/ML
0.2 INJECTION, SOLUTION INTRAMUSCULAR; INTRAVENOUS; SUBCUTANEOUS EVERY 5 MIN PRN
Status: DISCONTINUED | OUTPATIENT
Start: 2025-03-12 | End: 2025-03-12 | Stop reason: HOSPADM

## 2025-03-12 RX ORDER — LIDOCAINE HYDROCHLORIDE 20 MG/ML
INJECTION INTRAVENOUS
Status: DISCONTINUED | OUTPATIENT
Start: 2025-03-12 | End: 2025-03-12

## 2025-03-12 RX ORDER — DEXMEDETOMIDINE HYDROCHLORIDE 100 UG/ML
INJECTION, SOLUTION INTRAVENOUS
Status: DISCONTINUED | OUTPATIENT
Start: 2025-03-12 | End: 2025-03-12

## 2025-03-12 RX ORDER — SODIUM CHLORIDE 9 MG/ML
INJECTION, SOLUTION INTRAVENOUS CONTINUOUS
Status: CANCELLED | OUTPATIENT
Start: 2025-03-12

## 2025-03-12 RX ORDER — CEFAZOLIN 2 G/1
2 INJECTION, POWDER, FOR SOLUTION INTRAMUSCULAR; INTRAVENOUS
Status: COMPLETED | OUTPATIENT
Start: 2025-03-12 | End: 2025-03-12

## 2025-03-12 RX ORDER — EPHEDRINE SULFATE 50 MG/ML
INJECTION, SOLUTION INTRAVENOUS
Status: DISCONTINUED | OUTPATIENT
Start: 2025-03-12 | End: 2025-03-12

## 2025-03-12 RX ORDER — ACETAMINOPHEN 500 MG
1000 TABLET ORAL
Status: COMPLETED | OUTPATIENT
Start: 2025-03-12 | End: 2025-03-12

## 2025-03-12 RX ORDER — CEFAZOLIN 2 G/1
2 INJECTION, POWDER, FOR SOLUTION INTRAMUSCULAR; INTRAVENOUS
Status: CANCELLED | OUTPATIENT
Start: 2025-03-12 | End: 2025-03-13

## 2025-03-12 RX ORDER — MIDAZOLAM HYDROCHLORIDE 1 MG/ML
INJECTION INTRAMUSCULAR; INTRAVENOUS
Status: DISCONTINUED | OUTPATIENT
Start: 2025-03-12 | End: 2025-03-12

## 2025-03-12 RX ORDER — FAMOTIDINE 20 MG/1
20 TABLET, FILM COATED ORAL 2 TIMES DAILY
Status: CANCELLED | OUTPATIENT
Start: 2025-03-12

## 2025-03-12 RX ORDER — GLUCAGON 1 MG
1 KIT INJECTION
Status: DISCONTINUED | OUTPATIENT
Start: 2025-03-12 | End: 2025-03-12 | Stop reason: HOSPADM

## 2025-03-12 RX ORDER — BUPIVACAINE HYDROCHLORIDE 7.5 MG/ML
INJECTION, SOLUTION EPIDURAL; RETROBULBAR
Status: COMPLETED | OUTPATIENT
Start: 2025-03-12 | End: 2025-03-12

## 2025-03-12 RX ORDER — DEXAMETHASONE SODIUM PHOSPHATE 4 MG/ML
INJECTION, SOLUTION INTRA-ARTICULAR; INTRALESIONAL; INTRAMUSCULAR; INTRAVENOUS; SOFT TISSUE
Status: DISCONTINUED | OUTPATIENT
Start: 2025-03-12 | End: 2025-03-12

## 2025-03-12 RX ORDER — OXYCODONE HYDROCHLORIDE 5 MG/1
5 TABLET ORAL
Status: DISCONTINUED | OUTPATIENT
Start: 2025-03-12 | End: 2025-03-12 | Stop reason: HOSPADM

## 2025-03-12 RX ORDER — CELECOXIB 100 MG/1
200 CAPSULE ORAL 2 TIMES DAILY
Status: CANCELLED | OUTPATIENT
Start: 2025-03-12

## 2025-03-12 RX ORDER — OXYCODONE HYDROCHLORIDE 10 MG/1
10 TABLET ORAL EVERY 4 HOURS PRN
Refills: 0 | Status: CANCELLED | OUTPATIENT
Start: 2025-03-12

## 2025-03-12 RX ORDER — SODIUM CHLORIDE, SODIUM LACTATE, POTASSIUM CHLORIDE, CALCIUM CHLORIDE 600; 310; 30; 20 MG/100ML; MG/100ML; MG/100ML; MG/100ML
1000 INJECTION, SOLUTION INTRAVENOUS ONCE
Status: DISCONTINUED | OUTPATIENT
Start: 2025-03-12 | End: 2025-03-12 | Stop reason: HOSPADM

## 2025-03-12 RX ORDER — FENTANYL CITRATE 50 UG/ML
25-200 INJECTION, SOLUTION INTRAMUSCULAR; INTRAVENOUS
Status: DISCONTINUED | OUTPATIENT
Start: 2025-03-12 | End: 2025-03-12 | Stop reason: HOSPADM

## 2025-03-12 RX ORDER — PROPOFOL 10 MG/ML
VIAL (ML) INTRAVENOUS CONTINUOUS PRN
Status: DISCONTINUED | OUTPATIENT
Start: 2025-03-12 | End: 2025-03-12

## 2025-03-12 RX ORDER — MUPIROCIN 20 MG/G
OINTMENT TOPICAL
Status: DISCONTINUED | OUTPATIENT
Start: 2025-03-12 | End: 2025-03-12 | Stop reason: HOSPADM

## 2025-03-12 RX ORDER — MUPIROCIN 20 MG/G
1 OINTMENT TOPICAL 2 TIMES DAILY
Status: CANCELLED | OUTPATIENT
Start: 2025-03-12 | End: 2025-03-17

## 2025-03-12 RX ADMIN — SODIUM CHLORIDE, SODIUM GLUCONATE, SODIUM ACETATE, POTASSIUM CHLORIDE AND MAGNESIUM CHLORIDE: 526; 502; 368; 37; 30 INJECTION, SOLUTION INTRAVENOUS at 09:03

## 2025-03-12 RX ADMIN — FENTANYL CITRATE 50 MCG: 50 INJECTION, SOLUTION INTRAMUSCULAR; INTRAVENOUS at 06:03

## 2025-03-12 RX ADMIN — EPHEDRINE SULFATE 10 MG: 50 INJECTION, SOLUTION INTRAMUSCULAR; INTRAVENOUS; SUBCUTANEOUS at 11:03

## 2025-03-12 RX ADMIN — ACETAMINOPHEN 1000 MG: 500 TABLET ORAL at 06:03

## 2025-03-12 RX ADMIN — LIDOCAINE HYDROCHLORIDE 100 MG: 20 INJECTION, SOLUTION INTRAVENOUS at 10:03

## 2025-03-12 RX ADMIN — OXYCODONE HYDROCHLORIDE 10 MG: 10 TABLET, FILM COATED, EXTENDED RELEASE ORAL at 06:03

## 2025-03-12 RX ADMIN — Medication 25 MG: at 10:03

## 2025-03-12 RX ADMIN — TRANEXAMIC ACID 1000 MG: 100 INJECTION, SOLUTION INTRAVENOUS at 10:03

## 2025-03-12 RX ADMIN — EPHEDRINE SULFATE 10 MG: 50 INJECTION, SOLUTION INTRAMUSCULAR; INTRAVENOUS; SUBCUTANEOUS at 10:03

## 2025-03-12 RX ADMIN — PHENYLEPHRINE HYDROCHLORIDE 100 MCG: 10 INJECTION INTRAVENOUS at 11:03

## 2025-03-12 RX ADMIN — CEFAZOLIN 2 G: 2 INJECTION, POWDER, FOR SOLUTION INTRAMUSCULAR; INTRAVENOUS at 10:03

## 2025-03-12 RX ADMIN — PHENYLEPHRINE HYDROCHLORIDE 200 MCG: 10 INJECTION INTRAVENOUS at 12:03

## 2025-03-12 RX ADMIN — PROPOFOL 75 MCG/KG/MIN: 10 INJECTION, EMULSION INTRAVENOUS at 10:03

## 2025-03-12 RX ADMIN — MIDAZOLAM HYDROCHLORIDE 2 MG: 1 INJECTION INTRAMUSCULAR; INTRAVENOUS at 10:03

## 2025-03-12 RX ADMIN — OXYCODONE 5 MG: 5 TABLET ORAL at 12:03

## 2025-03-12 RX ADMIN — DEXAMETHASONE SODIUM PHOSPHATE 8 MG: 4 INJECTION, SOLUTION INTRA-ARTICULAR; INTRALESIONAL; INTRAMUSCULAR; INTRAVENOUS; SOFT TISSUE at 10:03

## 2025-03-12 RX ADMIN — BUPIVACAINE HYDROCHLORIDE 1.4 ML: 7.5 INJECTION, SOLUTION EPIDURAL; RETROBULBAR at 10:03

## 2025-03-12 RX ADMIN — ONDANSETRON 8 MG: 2 INJECTION INTRAMUSCULAR; INTRAVENOUS at 10:03

## 2025-03-12 RX ADMIN — CELECOXIB 200 MG: 100 CAPSULE ORAL at 06:03

## 2025-03-12 RX ADMIN — EPHEDRINE SULFATE 15 MG: 50 INJECTION, SOLUTION INTRAMUSCULAR; INTRAVENOUS; SUBCUTANEOUS at 10:03

## 2025-03-12 RX ADMIN — GLYCOPYRROLATE 0.2 MG: 0.2 INJECTION, SOLUTION INTRAMUSCULAR; INTRAVITREAL at 10:03

## 2025-03-12 RX ADMIN — BUPIVACAINE 20 ML: 13.3 INJECTION, SUSPENSION, LIPOSOMAL INFILTRATION at 06:03

## 2025-03-12 RX ADMIN — MUPIROCIN 1 G: 20 OINTMENT TOPICAL at 06:03

## 2025-03-12 RX ADMIN — PHENYLEPHRINE HYDROCHLORIDE 200 MCG: 10 INJECTION INTRAVENOUS at 11:03

## 2025-03-12 RX ADMIN — BUPIVACAINE HYDROCHLORIDE 10 ML: 5 INJECTION, SOLUTION EPIDURAL; INTRACAUDAL; PERINEURAL at 06:03

## 2025-03-12 RX ADMIN — SODIUM CHLORIDE, SODIUM GLUCONATE, SODIUM ACETATE, POTASSIUM CHLORIDE AND MAGNESIUM CHLORIDE: 526; 502; 368; 37; 30 INJECTION, SOLUTION INTRAVENOUS at 06:03

## 2025-03-12 RX ADMIN — DEXMEDETOMIDINE HYDROCHLORIDE 10 MCG: 100 INJECTION, SOLUTION INTRAVENOUS at 10:03

## 2025-03-12 RX ADMIN — MIDAZOLAM HYDROCHLORIDE 2 MG: 1 INJECTION, SOLUTION INTRAMUSCULAR; INTRAVENOUS at 06:03

## 2025-03-12 NOTE — PLAN OF CARE
Patient awake, alert, oriented. Vital signs stable. Patient verbalizes readiness for discharge. Patient cleared for discharge by PT. Patient reports having all necessary DME. Discharge instructions reviewed with patient and patient's family. Patient and patient's family verbalized understanding. IV removed, catheter intact. Dressing to R knee clean, dry, and intact. All belongings returned to patient. Patient transferred to car via wheelchair. Safety maintained.

## 2025-03-12 NOTE — ANESTHESIA POSTPROCEDURE EVALUATION
Anesthesia Post Evaluation    Patient: Ciaran Canchola    Procedure(s) Performed: Procedure(s) (LRB):  ARTHROPLASTY, KNEE (Right)    Final Anesthesia Type: spinal      Patient location during evaluation: PACU  Patient participation: Yes- Able to Participate  Level of consciousness: awake and alert  Post-procedure vital signs: reviewed and stable  Pain management: adequate  Airway patency: patent    PONV status at discharge: No PONV  Anesthetic complications: no      Cardiovascular status: blood pressure returned to baseline  Respiratory status: unassisted  Hydration status: euvolemic  Follow-up not needed.              Vitals Value Taken Time   /58 03/12/25 14:40   Temp 36.7 °C (98 °F) 03/12/25 13:15   Pulse 58 03/12/25 14:40   Resp 16 03/12/25 14:40   SpO2 96 % 03/12/25 14:40         Event Time   Out of Recovery 13:29:00         Pain/Zoe Score: Pain Rating Prior to Med Admin: 2 (3/12/2025 12:35 PM)  Pain Rating Post Med Admin: 2 (3/12/2025 12:55 PM)  Zoe Score: 10 (3/12/2025  1:15 PM)  Modified Zoe Score: 19 (3/12/2025  4:15 PM)

## 2025-03-12 NOTE — ANESTHESIA PREPROCEDURE EVALUATION
03/12/2025  Ciaran Canchola is a 74 y.o., male.      Pre-op Assessment    I have reviewed the Patient Summary Reports.    I have reviewed the NPO Status.   I have reviewed the Medications.     Review of Systems  Anesthesia Hx:  No problems with previous Anesthesia             Denies Family Hx of Anesthesia complications.    Denies Personal Hx of Anesthesia complications.                    Social:  Former Smoker       Cardiovascular:     Hypertension, well controlled           hyperlipidemia                         Hypertension         Pulmonary:         Probable DEEJAY               Neurological:  Neurology Normal                                      Endocrine:  Diabetes, type 2    Diabetes                    Obesity / BMI > 30  Psych:  Psychiatric History  depression                Physical Exam  General: Cooperative, Alert and Oriented    Airway:  Mallampati: III   Mouth Opening: Normal  TM Distance: Normal  Tongue: Normal  Neck: Girth Increased        Anesthesia Plan  Type of Anesthesia, risks & benefits discussed:    Anesthesia Type: Spinal  Intra-op Monitoring Plan: Standard ASA Monitors  Post Op Pain Control Plan: multimodal analgesia and peripheral nerve block  Informed Consent: Informed consent signed with the Patient and all parties understand the risks and agree with anesthesia plan.  All questions answered.   ASA Score: 3    Ready For Surgery From Anesthesia Perspective.     .

## 2025-03-12 NOTE — PLAN OF CARE
PT at bedside.      1500--Patient up to chair from stretcher per PT.      1502--Patient ambulating in hallway per PT.

## 2025-03-12 NOTE — PLAN OF CARE
Per Dr. Alonso, patient to start taking aspirin 325 mg twice daily for four weeks post-op starting tomorrow morning. Patient and patient's family educated. Patient and patient's family verbalized understanding. Patient reports Dr. Alonso already discussed this with him pre-op. Patient's spouse reports they have the aspirin at home.

## 2025-03-12 NOTE — DISCHARGE SUMMARY
[facility]  Discharge Note  Short Stay    Procedure(s) (LRB):  ARTHROPLASTY, KNEE (Right)      OUTCOME: Patient tolerated treatment/procedure well without complication and is now ready for discharge.    DISPOSITION: Home or Self Care    FINAL DIAGNOSIS:  Right knee osteoarthritis status post right total knee replacement    FOLLOWUP: In clinic    DISCHARGE INSTRUCTIONS:    Discharge Procedure Orders   Diet general     Change dressing (specify)   Order Comments: Dressing change:  In 4-5 days or as needed for saturated bandage with the Aquacel bandage provided     Call MD for:  temperature >100.4     Call MD for:  persistent nausea and vomiting     Call MD for:  severe uncontrolled pain     Call MD for:  difficulty breathing, headache or visual disturbances     Call MD for:  redness, tenderness, or signs of infection (pain, swelling, redness, odor or green/yellow discharge around incision site)     Call MD for:  hives     Call MD for:  persistent dizziness or light-headedness     Call MD for:  extreme fatigue        TIME SPENT ON DISCHARGE: 15 minutes

## 2025-03-12 NOTE — TRANSFER OF CARE
"Anesthesia Transfer of Care Note    Patient: Ciaran Canchola    Procedure(s) Performed: Procedure(s) (LRB):  ARTHROPLASTY, KNEE (Right)    Patient location: PACU    Anesthesia Type: spinal    Transport from OR: Transported from OR on 2-3 L/min O2 by NC with adequate spontaneous ventilation    Post pain: adequate analgesia    Post assessment: no apparent anesthetic complications and tolerated procedure well    Post vital signs: stable    Level of consciousness: awake and alert    Nausea/Vomiting: no nausea/vomiting    Complications: none    Transfer of care protocol was followed      Last vitals: Visit Vitals  BP (!) 103/56 (BP Location: Right arm, Patient Position: Lying)   Pulse (!) 54   Temp 36.6 °C (97.8 °F) (Skin)   Resp 20   Ht 5' 9" (1.753 m)   Wt 113.9 kg (251 lb 1.7 oz)   SpO2 99%   BMI 37.08 kg/m²     "

## 2025-03-12 NOTE — ANESTHESIA PROCEDURE NOTES
Peripheral Block    Patient location during procedure: pre-op   Block not for primary anesthetic.  Reason for block: at surgeon's request and post-op pain management   Post-op Pain Location: right knee   Start time: 3/12/2025 6:55 AM  Timeout: 3/12/2025 6:53 AM   End time: 3/12/2025 7:00 AM    Staffing  Authorizing Provider: Esvin Jolly MD  Performing Provider: Esvin Jolly MD    Staffing  Performed by: Esvin Jolly MD  Authorized by: Esvin Jolly MD    Preanesthetic Checklist  Completed: patient identified, IV checked, site marked, risks and benefits discussed, surgical consent, monitors and equipment checked, pre-op evaluation and timeout performed  Peripheral Block  Patient position: supine  Prep: ChloraPrep  Patient monitoring: heart rate, cardiac monitor, continuous pulse ox, continuous capnometry and frequent blood pressure checks  Block type: adductor canal  Laterality: right  Injection technique: single shot  Needle  Needle type: Stimuplex   Needle gauge: 21 G  Needle length: 4 in  Needle localization: anatomical landmarks and ultrasound guidance   -ultrasound image captured on disc.  Assessment  Injection assessment: negative aspiration, negative parasthesia and local visualized surrounding nerve  Paresthesia pain: none  Heart rate change: no  Slow fractionated injection: yes    Medications:    Medications: BUPivacaine liposome (PF) 1.3 % (13.3 mg/mL) suspension - Injection   20 mL - 3/12/2025 6:55:00 AM  bupivacaine (pf) (MARCAINE) injection 0.5% - Perineural   10 mL - 3/12/2025 6:55:00 AM    Additional Notes  VSS.  [facility] RN monitoring vitals throughout procedure.  Patient tolerated procedure well.

## 2025-03-12 NOTE — PLAN OF CARE
Report received from ANTOINE Cordova. Aquacell dressing at bedside. Per Dr. Alonso, leave post-op dressing on and send aquacell dressing home with patient for home health to change post-op dressing in 4-5 days or as needed (see discharge instructions). Patient and patient's family educated. Patient and patient's family verbalized understanding.

## 2025-03-12 NOTE — ANESTHESIA PROCEDURE NOTES
Spinal    Diagnosis: knee pain right  Patient location during procedure: OR  Start time: 3/12/2025 10:20 AM  Timeout: 3/12/2025 10:15 AM  End time: 3/12/2025 10:25 AM    Staffing  Authorizing Provider: Esvin Jolly MD  Performing Provider: Esvin Jolly MD    Staffing  Performed by: Esvin Jolly MD  Authorized by: Esvin Jolly MD    Preanesthetic Checklist  Completed: patient identified, IV checked, site marked, risks and benefits discussed, surgical consent, monitors and equipment checked, pre-op evaluation and timeout performed  Spinal Block  Patient position: sitting  Prep: ChloraPrep  Patient monitoring: cardiac monitor, continuous pulse ox and continuous capnometry  Approach: right paramedian  Location: L3-4  Injection technique: single shot  CSF Fluid: clear free-flowing CSF  Needle  Needle type: pencil-tip   Needle gauge: 22 G  Needle length: 3.5 in  Needle localization: anatomical landmarks  Assessment  Ease of block: moderate  Patient's tolerance of the procedure: comfortable throughout block  Medications:    Medications: bupivacaine (pf) (MARCAINE) injection 0.75% - Intraspinal   1.4 mL - 3/12/2025 10:20:00 AM

## 2025-03-12 NOTE — PT/OT/SLP EVAL
Physical Therapy Evaluation and Discharge Note    Patient Name:  Ciaran Canchola   MRN:  [id number]    Recommendations:     Discharge Recommendations: Low Intensity Therapy  Discharge Equipment Recommendations: none (has RW)   Barriers to discharge: None    Assessment:     Ciaran Canchola is a 74 y.o. male admitted with a medical diagnosis of <principal problem not specified>. .  Pt see n at post 05, alert and agreeable to PT. Thera ex in supine with good execution. Pt requiring min assist to sit EOB- some burning sensation at incision. Pt ambulated with RW 250ft with min assist and another person following with chair. Pt took 1 seated rest. Bathroom use and voided small amount. Back to room and up in chair  Education for HEP.  LIT     Recent Surgery: Procedure(s) (LRB):  ARTHROPLASTY, KNEE (Right) * Day of Surgery *    Plan:     During this hospitalization, patient does not require further acute PT services.  Please re-consult if situation changes.      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  Equipment used at home: none.  DME owned (not currently used): none.  Upon discharge, patient will have assistance from family.    Objective:     Communicated with nurse Crump prior to session.  Patient found HOB elevated with   upon PT entry to room.    General Precautions: Standard, fall    Orthopedic Precautions:RLE weight bearing as tolerated   Braces: N/A  Respiratory Status: Room air    Exams:  Postural Exam:  Patient presented with the following abnormalities:    -       Rounded  shoulders  -       Forward head  -       BMI 37.08  RLE ROM: Deficits: RK flexion ~80*  RLE Strength: Deficits: 3/5  LLE ROM: WFL  LLE Strength: WFL    Functional Mobility:  Bed Mobility:     Scooting: minimum assistance  Supine to Sit: minimum assistance  Transfers:     Sit to Stand:  minimum assistance with rolling walker  Bed to Chair: minimum assistance with  rolling walker  using  Stand Pivot  Toilet Transfer: minimum assistance with  rolling walker  using  stood to void  Gait: 250ft with RW with another person following with chair and 1 seated rest  Stairs:  Pt ascended/descended 1threshold stair(s) with Rolling Walker with no handrails with Minimal Assistance.     AM-PAC 6 CLICK MOBILITY  Total Score:18       Treatment and Education:  Patient was educated on the importance of OOB activity and functional mobility to negate negative effects of prolonged bed rest during hospitalization, safe transfers and ambulation, and D/C planning   Thera ex with AP,QS/GS,SLR and HS x 20  Gait at hallways and 1 threshold step training  Education for HEP    AM-PAC 6 CLICK MOBILITY  Total Score:18     Patient left up in chair with all lines intact, call button in reach, nurse Alisia notified, and family present.    GOALS:   Multidisciplinary Problems       Physical Therapy Goals       Not on file                    DME Justifications:  No DME recommended requiring DME justifications    History:     Past Medical History:   Diagnosis Date    Depression     Diabetes mellitus     Hyperlipidemia     Hypertension     Hypertensive retinopathy of both eyes 11/20/2023       Past Surgical History:   Procedure Laterality Date    CATARACT EXTRACTION W/  INTRAOCULAR LENS IMPLANT Left 2/7/2024    Procedure: CEIOL OS;  Surgeon: Donato Godoy MD;  [facility]: [facility] OR;  Service: Ophthalmology;  Laterality: Left;    CATARACT EXTRACTION W/  INTRAOCULAR LENS IMPLANT Right 3/13/2024    Procedure: CEIOL OD;  Surgeon: Donato Godoy MD;   Location: [facility] OR;  Service: Ophthalmology;  Laterality: Right;    CHOLECYSTECTOMY      COLONOSCOPY  2018    RTC in 5 years. done in [place]    TONSILLECTOMY      UMBILICAL HERNIA REPAIR         Time Tracking:     PT Received On: 03/12/25  PT Start Time: 1439     PT Stop Time: 1532  PT Total Time (min): 53 min     Billable Minutes: Evaluation 10, Gait Training 30, and Therapeutic Exercise 13      03/12/2025

## 2025-03-12 NOTE — DISCHARGE INSTRUCTIONS
"Discharge Instructions: After Your Surgery/Procedure  Youve just had surgery. During surgery you were given medicine called anesthesia to keep you relaxed and free of pain. After surgery you may have some pain or nausea. This is common. Here are some tips for feeling better and getting well after surgery.     Stay on schedule with your medication.   Going home  Your doctor or nurse will show you how to take care of yourself when you go home. He or she will also answer your questions. Have an adult family member or friend drive you home.      For your safety we recommend these precaution for the first 24 hours after your procedure:  Do not drive or use heavy equipment.  Do not make important decisions or sign legal papers.  Do not drink alcohol.  Have someone stay with you, if needed. He or she can watch for problems and help keep you safe.  Your concentration, balance, coordination, and judgement may be impaired for many hours after anesthesia.  Use caution when ambulating or standing up.     You may feel weak and "washed out" after anesthesia and surgery.      Subtle residual effects of general anesthesia or sedation with regional / local anesthesia can last more than 24 hours.  Rest for the remainder of the day or longer if your Doctor/Surgeon has advised you to do so.  Although you may feel normal within the first 24 hours, your reflexes and mental ability may be impaired without you realizing it.  You may feel dizzy, lightheaded or sleepy for 24 hours or longer.      Be sure to go to all follow-up visits with your doctor. And rest after your surgery for as long as your doctor tells you to.  Coping with pain  If you have pain after surgery, pain medicine will help you feel better. Take it as told, before pain becomes severe. Also, ask your doctor or pharmacist about other ways to control pain. This might be with heat, ice, or relaxation. And follow any other instructions your surgeon or nurse gives you.  Tips " for taking pain medicine  To get the best relief possible, remember these points:  Pain medicines can upset your stomach. Taking them with a little food may help.  Most pain relievers taken by mouth need at least 20 to 30 minutes to start to work.  Taking medicine on a schedule can help you remember to take it. Try to time your medicine so that you can take it before starting an activity. This might be before you get dressed, go for a walk, or sit down for dinner.  Constipation is a common side effect of pain medicines. Call your doctor before taking any medicines such as laxatives or stool softeners to help ease constipation. Also ask if you should skip any foods. Drinking lots of fluids and eating foods such as fruits and vegetables that are high in fiber can also help. Remember, do not take laxatives unless your surgeon has prescribed them.  Drinking alcohol and taking pain medicine can cause dizziness and slow your breathing. It can even be deadly. Do not drink alcohol while taking pain medicine.  Pain medicine can make you react more slowly to things. Do not drive or run machinery while taking pain medicine.  Your health care provider may tell you to take acetaminophen to help ease your pain. Ask him or her how much you are supposed to take each day. Acetaminophen or other pain relievers may interact with your prescription medicines or other over-the-counter (OTC) drugs. Some prescription medicines have acetaminophen and other ingredients. Using both prescription and OTC acetaminophen for pain can cause you to overdose. Read the labels on your OTC medicines with care. This will help you to clearly know the list of ingredients, how much to take, and any warnings. It may also help you not take too much acetaminophen. If you have questions or do not understand the information, ask your pharmacist or health care provider to explain it to you before you take the OTC medicine.  Managing nausea  Some people have an  upset stomach after surgery. This is often because of anesthesia, pain, or pain medicine, or the stress of surgery. These tips will help you handle nausea and eat healthy foods as you get better. If you were on a special food plan before surgery, ask your doctor if you should follow it while you get better. These tips may help:  Do not push yourself to eat. Your body will tell you when to eat and how much.  Start off with clear liquids and soup. They are easier to digest.  Next try semi-solid foods, such as mashed potatoes, applesauce, and gelatin, as you feel ready.  Slowly move to solid foods. Dont eat fatty, rich, or spicy foods at first.  Do not force yourself to have 3 large meals a day. Instead eat smaller amounts more often.  Take pain medicines with a small amount of solid food, such as crackers or toast, to avoid nausea.     Call your surgeon if  You still have pain an hour after taking medicine. The medicine may not be strong enough.  You feel too sleepy, dizzy, or groggy. The medicine may be too strong.  You have side effects like nausea, vomiting, or skin changes, such as rash, itching, or hives.       If you have obstructive sleep apnea  You were given anesthesia medicine during surgery to keep you comfortable and free of pain. After surgery, you may have more apnea spells because of this medicine and other medicines you were given. The spells may last longer than usual.   At home:  Keep using the continuous positive airway pressure (CPAP) device when you sleep. Unless your health care provider tells you not to, use it when you sleep, day or night. CPAP is a common device used to treat obstructive sleep apnea.  Talk with your provider before taking any pain medicine, muscle relaxants, or sedatives. Your provider will tell you about the possible dangers of taking these medicines.  © 7035-0209 [employer]. [address] [place] [address], [place] [address]. All rights reserved. This information is  not intended as a substitute for professional medical care. Always follow your healthcare professional's instructions.          Using an Incentive Spirometer    An incentive spirometer is a device that helps you do deep breathing exercises. These exercises expand your lungs, aid in circulation, and help prevent pneumonia. Deep breathing exercises also help you breathe better and improve the function of your lungs by:  Keeping your lungs clear  Strengthening your breathing muscles  Helping prevent respiratory complications or problems  The incentive spirometer gives you a way to take an active part in recover. A nurse or therapist will teach you breathing exercises. To do these exercises, you will breathe in through your mouth and not your nose. The incentive spirometer only works correctly if you breathe in through your mouth.  Steps to clear lungs  Step 1. Exhale normally. Then, inhale normally.  Relax and breathe out.  Step 2. Place your lips tightly around the mouthpiece.  Make sure the device is upright and not tilted.  Step 3. Inhale as much air as you can through the mouthpiece (don't breath through your nose).  Inhale slowly and deeply.  Hold your breath long enough to keep the balls or disk raised for at least 3 to 5 seconds, or as instructed by your healthcare provider.  Some spirometers have an indicator to let you know that you are breathing in too fast. If the indicator goes off, breathe in more slowly.  Step 4. Repeat the exercise regularly.  Do this exercise every hour while you're awake, or as instructed by your healthcare provider.  If you were taught deep breathing and coughing exercises, do them regularly as instructed by your healthcare provider.           Post op instructions for prevention of DVT  What is deep vein thrombosis?  Deep vein thrombosis (DVT) is the medical term for blood clots in the deep veins of the leg.  These blood clots can be dangerous.  A DVT can block a blood vessel and keep  blood from getting where it needs to go.  Another problem is that the clot can travel to other parts of the body such as the lungs.  A clot that travels to the lungs is called a pulmonary embolus (PE) and can cause serious problems with breathing which can lead to death.  Am I at risk for DVT/PE?  If you are not very active, you are at risk of DVT.  Anyone confined to bed, sitting for long periods of time, recovering from surgery, etc. increases the risk of DVT.  Other risk factors are cancer diagnosis, certain medications, estrogen replacement in any form,older age, obesity, pregnancy, smoking, history of clotting disorders, and dehydration.  How will I know if I have a DVT?  Swelling in the lower leg  Pain  Warmth, redness, hardness or bulging of the vein  If you have any of these symptoms, call your doctors office right away.  Some people will not have any symptoms until the clot moves to the lungs.  What are the symptoms of a PE?  Panting, shortness of breath, or trouble breathing  Sharp, knife-like chest pain when you breathe  Coughing or coughing up blood  Rapid heartbeat  If you have any of these symptoms or get worse quickly, call 911 for emergency treatment.  How can I prevent a DVT?  Avoid long periods of inactivity and dont cross your legs--get up and walk around every hour or so.  Stay active--walking after surgery is highly encouraged.  This means you should get out of the house and walk in the neighborhood.  Going up and down stairs will not impair healing (unless advised against such activity by your doctor).    Drink plenty of noncaffeinated, nonalcoholic fluids each day to prevent dehydration.  Wear special support stockings, if they have been advised by your doctor.  If you travel, stop at least once an hour and walk around.  Avoid smoking (assistance with stopping is available through your healthcare provider)  Always notify your doctor if you are not able to follow the post operative  instructions that are given to you at the time of discharge.  It may be necessary to prescribe one of the medications available to prevent DVT.          Exparel(bupivacaine) has been injected to provide approximately 72 hours of reduced pain after your surgery.  Do not remove the bracelet for five days.  Report to your doctor as soon as possible if you experience any of the following:   Restlessness   Anxiety   Speech problems    Lightheadedness   Numbness and tingling of the mouth and lips   Seizures    Metallic taste   Blurred vision   Tremors    Twitching   Depression   Extreme drowsiness  Avoid additional use of local anesthetics (such as dental procedures) for five days (96 hours).          We hope your stay was comfortable as you heal now, mend and rest.    For we have enjoyed taking care of you by giving your our best.    And as you get better, by regaining your health and strength;   We count it as a privilege to have served you and hope your time at [facility] was well spent.      Thank  You!!!

## 2025-03-12 NOTE — PLAN OF CARE
Patient with thigh-high CHARLEEN hose on to RLE and knee-high CHARLEEN hose on to LLE. Per Dr. Alonso, patient to wear thigh-high CHARLEEN hose to bilateral lower extremities for four weeks post-op. Patient and patient's family educated. Patient and patient's family verbalized understanding. CHARLEEN hose to LLE removed and thigh-high CHARLEEN hose applied, patient tolerated well. Extra pair of thigh-high CHARLEEN hose also supplied to patient.

## 2025-03-12 NOTE — OP NOTE
[facility]  Orthopedic Surgery  Operative Note    SUMMARY     Date of Procedure: 3/12/2025     Procedure: Procedure(s) (LRB):  ARTHROPLASTY, KNEE (Right)       Surgeons and Role:     * Tay Alonso MD - Primary    Assisting Surgeon: None    Pre-Operative Diagnosis: Primary osteoarthritis of right knee [M17.11]  Pre-op testing [Z01.818]    Post-Operative Diagnosis: Post-Op Diagnosis Codes:     * Primary osteoarthritis of right knee [M17.11]     * Pre-op testing [Z01.818]    Anesthesia: Choice    Significant Surgical Tasks Conducted by the Assistant(s), if Applicable:  Patient positioning prepping and draping wound exposure facilitation of implantation of components final wound closure application of postoperative bandages    Complications: None.    Estimated Blood Loss (EBL): 100 mL           Implants:   Implant Name Type Inv. Item Serial No.  Lot No. LRB No. Used Action   CEMENT BONE SIMPLEX HV RADPQ - [id number]  CEMENT BONE SIMPLEX HV RADPQ  [employer]. [id number] Right 2 Implanted   TIBIAL TRAY CRUCIATE 75MM - [id number]  TIBIAL TRAY CRUCIATE 75MM  [employer] [id number] Right 1 Implanted   COMPONENT VANGUARD FEM 67.5M R - [id number]  COMPONENT VANGUARD FEM 67.5M R  [place] [id number] Right 1 Implanted   PATELLA COMPONENT 3 PEG 31X8MM - [id number]  PATELLA COMPONENT 3 PEG 31X8MM  [employer] [id number] Right 1 Implanted   BEARING VANGUARD CR 71/50O77UH - [id number]  BEARING VANGUARD CR 71/48W50UB  [place] [id number] Right 1 Implanted       Specimens:   Specimen (24h ago, onward)      None               Description of Procedure:  After informed consent was obtained correctly identifying the patient she was taken to the operating room and after adequate anesthesia was prepped and draped in usual standard fashion.  The right extremity was elevated exsanguinated tourniquet inflated to 250 mmHg.  Longitudinal incision was made over the knee standard parapatellar  median incision was made.  The patella was everted.  Hyperflexed the knee exposed distal tibia placed a canal rosita after drilling the central portion of the canal placed an alignment rosita made a conservative 10 mm cut on the distal femur.  This was after placing my cutting guide.  I then placed a 4 in 1 cutting guide and made anterior-posterior cuts and the chamfer cuts under direct visualization protecting all the collaterals in the skin.  At this point I exposed the tibia using PCL retractor and lateral retractors made a conservative cut using an alignment rosita taking just a mm or 2 off the most affected medial side.  After this was accomplished checked it with a 10 mm spacer to ensure adequate bony cuts.  At this point I sized the tibia drilled and punched an appropriate size tibia.  I placed my trial femur along with my trial tibial base plate and a 10 mm spacer and this gave nice stability range of motion equal to equal flexion and extension gaps.  At this point with the knee everted and placing 2 penetrating towel clips I exposed the patella made a freehand saw cut sized and punched the patella.  This point I moved my removed my trials irrigated the incision in the bone prepared for cement.  After drying the cement I placed a bit of cement on the tibial base plate and on the tibia tapped the tibial base plate into place.  In similar fashion placed some bit of cement on the posterior portion of the femoral implant and along the bony surfaces impacted the femur into position and then used a 10 or 12 mm spacer removed excess cement gently irrigated this and cemented my patella and placed and held with a clamp.  After removing all excess cement I placed several lap sponges placed an Esmarch and let the tourniquet down.  After the cement had cured I removed the Esmarch irrigated the incision obtained hemostasis with minimal use of bipolar electrocautery.  Took the knee through a range of motion and the patella had  nice patellar tracking there was equal flexion-extension gaps and nice stable knee with excellent alignment of the components.  At this point after 1 final irrigation I closed the arthrotomy with 2.  Ethibond.  Closed the subcutaneous tissue with 0 Vicryl 2-0 Vicryl followed by a running 3-0 Monocryl.  This was followed by Xeroform dressing sponges Webril and a lightly compressive Ace wrap.  The patient tolerated the procedure well was taken to the recovery room in stable condition with brisk capillary refill of digits intact dorsalis pedis and posterior tibial pulses.

## 2025-03-13 ENCOUNTER — TELEPHONE (OUTPATIENT)
Dept: ORTHOPEDICS | Facility: CLINIC | Age: 75
End: 2025-03-13
Payer: MEDICARE

## 2025-03-13 VITALS
TEMPERATURE: 98 F | WEIGHT: 251.13 LBS | RESPIRATION RATE: 16 BRPM | HEIGHT: 69 IN | BODY MASS INDEX: 37.2 KG/M2 | DIASTOLIC BLOOD PRESSURE: 58 MMHG | HEART RATE: 58 BPM | OXYGEN SATURATION: 96 % | SYSTOLIC BLOOD PRESSURE: 125 MMHG

## 2025-03-13 DIAGNOSIS — Z96.651 S/P TOTAL KNEE ARTHROPLASTY, RIGHT: Primary | ICD-10-CM

## 2025-03-13 RX ORDER — CELECOXIB 200 MG/1
200 CAPSULE ORAL DAILY
Qty: 30 CAPSULE | Refills: 1 | Status: SHIPPED | OUTPATIENT
Start: 2025-03-13

## 2025-03-13 NOTE — TELEPHONE ENCOUNTER
Spoke with pt - advised he contact his PCP if needing a handicap tag as this is not something our office would do. Inquired how pt was feeling today - pt stated he has had better days but is doing okay. Pt verbalized understanding and will contact PCP. Advised he give us a call if needed.

## 2025-03-13 NOTE — TELEPHONE ENCOUNTER
Per Dr. Alonso: can take OTC benadryl at night and add celebrex 200 bid     Medication sent to provider.   Advisement given to pt  Pt verbalized understanding.

## 2025-03-13 NOTE — TELEPHONE ENCOUNTER
----- Message from Med Assistant Golden sent at 3/13/2025  2:27 PM CDT -----  Contact: karen wife  Pain medication is not strong enough, not sleeping Call back  [phone]

## 2025-03-13 NOTE — TELEPHONE ENCOUNTER
----- Message from Rupesh sent at 3/13/2025 10:30 AM CDT -----  Daughter / Ning would like a callback regarding a handicap tag for the patient's car.[phone] or patient's wife [phone]

## 2025-03-20 DIAGNOSIS — F33.42 RECURRENT MAJOR DEPRESSIVE DISORDER, IN FULL REMISSION: ICD-10-CM

## 2025-03-20 RX ORDER — SERTRALINE HYDROCHLORIDE 50 MG/1
TABLET, FILM COATED ORAL
Qty: 180 TABLET | Refills: 0 | Status: SHIPPED | OUTPATIENT
Start: 2025-03-20

## 2025-03-20 NOTE — TELEPHONE ENCOUNTER
No care due was identified.  [facility] Embedded Care Due Messages. Reference number: [id number].   3/20/2025 9:44:58 AM CDT

## 2025-03-28 ENCOUNTER — OFFICE VISIT (OUTPATIENT)
Dept: ORTHOPEDICS | Facility: CLINIC | Age: 75
End: 2025-03-28
Payer: MEDICARE

## 2025-03-28 DIAGNOSIS — Z96.651 S/P TOTAL KNEE ARTHROPLASTY, RIGHT: Primary | ICD-10-CM

## 2025-03-28 PROCEDURE — 99999 PR PBB SHADOW E&M-EST. PATIENT-LVL III: CPT | Mod: PBBFAC,,, | Performed by: ORTHOPAEDIC SURGERY

## 2025-03-28 NOTE — PROGRESS NOTES
Subjective:      Patient ID: Ciaran Canchola is a 74 y.o. male.    Chief Complaint: Pain and Post-op Evaluation of the Right Knee (DOS 3/12/2025)    HPI  The patient follow up 2 weeks status post right total knee replacement.  Having very little pain feels like he is progressing well with therapy.  ROS      Objective:    Ortho Exam     Patient comes in with a steady minimally antalgic cane assisted gait  Nicely healing surgical incision with no erythema or drainage  Range of motion 0-95 degrees  He has some mild diffuse lower extremity edema soft compartments no calf tenderness  Normal distal neurologic and vascular examination    X-Ray Knee 1 or 2 View Right  Narrative: EXAMINATION:  XR KNEE 1 OR 2 VIEW RIGHT    CLINICAL HISTORY:  Two-view status post right total knee;    TECHNIQUE:  AP and lateral views of the right knee were performed.    COMPARISON:  Knee x-rays of April 17, 2024    FINDINGS:  The patient has undergone a right knee arthroplasty with metallic femoral and tibial components in good position.  A fracture is not seen.  Impression: Status post right knee arthroplasty.    Electronically signed by: Tc Nava MD  Date:    03/12/2025  Time:    13:20       My Radiographs Findings:    No new radiographs  Assessment:       Encounter Diagnosis   Name Primary?    S/P total knee arthroplasty, right Yes         Plan:       Patient is doing well status post right total knee replacement.  We will transition from home to outpatient physical therapy.  We have discussed ongoing rehab per protocol slowly advancing activities as tolerated follow up in 6 weeks for re-evaluation x-rays and range-of-motion check sooner if any questions or problems.        Past Medical History:   Diagnosis Date    Depression     Diabetes mellitus     Hyperlipidemia     Hypertension     Hypertensive retinopathy of both eyes 11/20/2023     Past Surgical History:   Procedure Laterality Date    CATARACT EXTRACTION W/  INTRAOCULAR  LENS IMPLANT Left 2024    Procedure: CEIOL OS;  Surgeon: Donato Godoy MD;  Location: [facility] OR;  Service: Ophthalmology;  Laterality: Left;    CATARACT EXTRACTION W/  INTRAOCULAR LENS IMPLANT Right 3/13/2024    Procedure: CEIOL OD;  Surgeon: Donato Godoy MD;  Location: [facility] OR;  Service: Ophthalmology;  Laterality: Right;    CHOLECYSTECTOMY      COLONOSCOPY  2018    RTC in 5 years. done in [place]    KNEE ARTHROPLASTY Right 3/12/2025    Procedure: ARTHROPLASTY, KNEE;  Surgeon: Tay Alonso MD;  Location: [facility] OR;  Service: Orthopedics;  Laterality: Right;    TONSILLECTOMY      UMBILICAL HERNIA REPAIR         Current Medications[1]    Review of patient's allergies indicates:  No Known Allergies    Family History   Problem Relation Name Age of Onset    ALS Mother      Heart disease Father      Heart disease Sister       Social History     Occupational History    Not on file   Tobacco Use    Smoking status: Former     Current packs/day: 0.00     Types: Cigarettes     Quit date:      Years since quittin.2    Smokeless tobacco: Never    Tobacco comments:     Quit  35 years ago   Substance and Sexual Activity    Alcohol use: Yes     Comment: SOCIALLY    Drug use: Never    Sexual activity: Yes     Partners: Female            [1]   Current Outpatient Medications:     celecoxib (CELEBREX) 200 MG capsule, Take 1 capsule (200 mg total) by mouth once daily., Disp: 30 capsule, Rfl: 1    empagliflozin (JARDIANCE) 10 mg tablet, TAKE 1 TABLET ONE TIME DAILY, Disp: 90 tablet, Rfl: 0    esomeprazole (NEXIUM) 40 MG capsule, Take 1 capsule (40 mg total) by mouth before breakfast., Disp: 90 capsule, Rfl: 0    HYDROcodone-acetaminophen (NORCO) 5-325 mg per tablet, Take 1 tablet by mouth every 6 (six) hours as needed for Pain., Disp: 30 tablet, Rfl: 0    lisinopriL-hydrochlorothiazide (PRINZIDE,ZESTORETIC) 20-25 mg Tab, Take 1 tablet by mouth once daily., Disp: 90 tablet, Rfl: 0    meloxicam (MOBIC)  15 MG tablet, TAKE 1 TABLET(15 MG) BY MOUTH DAILY, Disp: 90 tablet, Rfl: 0    rosuvastatin (CRESTOR) 5 MG tablet, TAKE 1 TABLET EVERY EVENING, Disp: 90 tablet, Rfl: 2    sertraline (ZOLOFT) 50 MG tablet, , Disp: 180 tablet, Rfl: 0    alcohol swabs (ALCOHOL PREP) PadM, Apply 2 each topically once daily., Disp: 200 each, Rfl: 3    blood sugar diagnostic (ACCU-CHEK GUIDE TEST STRIPS) Strp, Inject 2 each into the skin 2 (two) times a day., Disp: 200 strip, Rfl: 3    blood-glucose meter (ACCU-CHEK GUIDE ME GLUCOSE MTR) Misc, Inject 1 each into the skin once daily., Disp: 1 each, Rfl: 0    lancets (ACCU-CHEK SOFTCLIX LANCETS) Misc, TEST BLOOD SUGAR TWO TIMES DAILY, Disp: 200 each, Rfl: 3

## 2025-03-31 ENCOUNTER — TELEPHONE (OUTPATIENT)
Dept: ORTHOPEDICS | Facility: CLINIC | Age: 75
End: 2025-03-31
Payer: MEDICARE

## 2025-03-31 DIAGNOSIS — F33.42 RECURRENT MAJOR DEPRESSIVE DISORDER, IN FULL REMISSION: ICD-10-CM

## 2025-03-31 DIAGNOSIS — Z96.651 S/P TOTAL KNEE ARTHROPLASTY, RIGHT: Primary | ICD-10-CM

## 2025-03-31 RX ORDER — SERTRALINE HYDROCHLORIDE 50 MG/1
TABLET, FILM COATED ORAL
Qty: 180 TABLET | Refills: 0 | OUTPATIENT
Start: 2025-03-31

## 2025-03-31 NOTE — TELEPHONE ENCOUNTER
No care due was identified.  [facility] Embedded Care Due Messages. Reference number: [id number].   3/31/2025 4:50:44 PM CDT

## 2025-03-31 NOTE — TELEPHONE ENCOUNTER
----- Message from Med Assistant Jensen sent at 3/31/2025  2:38 PM CDT -----  Contact: patient  Needs order for PT put in system so patient can get schedule at [facility] in [place].Call back number is [phone]

## 2025-04-01 ENCOUNTER — CLINICAL SUPPORT (OUTPATIENT)
Dept: REHABILITATION | Facility: HOSPITAL | Age: 75
End: 2025-04-01
Attending: ORTHOPAEDIC SURGERY
Payer: MEDICARE

## 2025-04-01 DIAGNOSIS — M25.661 DECREASED ROM OF RIGHT KNEE: ICD-10-CM

## 2025-04-01 DIAGNOSIS — Z96.651 S/P TOTAL KNEE ARTHROPLASTY, RIGHT: ICD-10-CM

## 2025-04-01 DIAGNOSIS — Z74.09 IMPAIRED FUNCTIONAL MOBILITY AND ACTIVITY TOLERANCE: Primary | ICD-10-CM

## 2025-04-01 DIAGNOSIS — R26.9 GAIT DIFFICULTY: ICD-10-CM

## 2025-04-01 PROCEDURE — 97162 PT EVAL MOD COMPLEX 30 MIN: CPT | Mod: PN

## 2025-04-01 PROCEDURE — 97140 MANUAL THERAPY 1/> REGIONS: CPT | Mod: PN

## 2025-04-02 NOTE — PROGRESS NOTES
Outpatient Rehab    Physical Therapy Evaluation    Patient Name: Ciaran Canchola  MRN: [id number]  YOB: 1950  Encounter Date: 4/1/2025    Therapy Diagnosis:   Encounter Diagnoses   Name Primary?    Impaired functional mobility and activity tolerance Yes    Gait difficulty     Decreased ROM of right knee     S/P total knee arthroplasty, right      Physician: Tay Alonso,*    Physician Orders: Eval and Treat  Medical Diagnosis: S/P total knee arthroplasty, right    Visit # / Visits Authorized:  1 / 1  Insurance Authorization Period: 3/31/2025 to 3/31/2026  Date of Evaluation: 4/1/2025  Plan of Care Certification: 4/1/2025 to 5/19/2025     Time In: 1018   Time Out: 1105  Total Time: 47   Total Billable Time: 44    Intake Outcome Measure for FOTO Survey    Therapist reviewed FOTO scores for Ciaran Canchola on 4/1/2025.   FOTO report - see Media section or FOTO account episode details.     Intake Score: 59%    Precautions  Right Lower Extremity Weight-Bearing Status: Full weight-bearing       Subjective   History of Present Illness  Ciaran is a 74 y.o. male who reports to physical therapy with a chief concern of Limited ROM.     The patient reports a medical diagnosis of S/P total knee arthroplasty, right.  Patient reports a surgery of S/P total knee arthroplasty, right. Surgery occurred on 03/12/25. Diagnostic tests related to this condition: X-ray.   X-Ray Details: R knee 3/12/2025 (post-op): FINDINGS per report:  The patient has undergone a right knee arthroplasty with metallic femoral and tibial components in good position.  A fracture is not seen.    History of Present Condition/Illness: Patient presents to PT with long history of R knee dysfunction.  Symptoms progressively worsened over time. He subsequently underwent R TKA without complication on 3/12/2025. Went home day of surgery and started home health the following day.  He continued with home health therapy until 3/28/29235  at which time he was discharged and referred to OP PT.  He presents today for evaluation.     Activities of Daily Living  Social history was obtained from Patient.    General Prior Level of Function Comments: Independent in self care, home care, work activities but with increased pain walking/standing, etc.  General Current Level of Function Comments: Difficulty donning R shoe and sock. Sleep is somewhat disturbed, Increased UE support for transfers, walking with straight cane, has resumed driving, limited squatting, not currently working. Unable to perform yard work activities.  Patient Responsibilities: Community mobility, Driving, Financial management, Health management, Home management, Laundry, Meal prep, Personal ADL, Shopping, Yard work    Previously independent with activities of daily living? Yes     Currently independent with activities of daily living? Yes     Difficulty donning R shoe and sock but otherwise able to perform bathing and dressing activities. Sleep is disturbed.  Sometimes has difficulty getting to sleep, sometimes having difficulty staying asleep.     Previously independent with instrumental activities of daily living? Yes           Unable to perform yard work activities.  Able to perform home care activities except those that require squatting/bending      Pain     Patient reports a current pain level of 2/10. Pain at best is reported as 2/10. Pain at worst is reported as 6/10.   Location: Medial and lateral R knee, posterior R knee  Clinical Progression (since onset): Improved  Pain Qualities: Other (Comment), Aching, Sharp  Other Pain Qualities: Constant aching with intermittent sharp pain.  Pain-Relieving Factors: Rest, Activity modification, Ice, Medications - prescription  Pain-Aggravating Factors: Stair climbing, Walking, Standing, Squatting  Moving it after it's been still for a bit      Review of Systems  Patient reports: Diabetes, Gallbladder History, and Osteoarthritis         Treatment History  Treatments  Previously Received Treatments: Yes  Previous Treatments: Exercise, Physical therapy, Other (Comment)  Other Previous Treatments: Exercises provided by MD and home health PT.  Currently Receiving Treatments: Yes  Current Treatments: Medications - prescription, Ice    Living Arrangements  Living Situation  Housing: Home independently  Living Arrangements: Spouse/significant other  Support Systems: Spouse/significant other    Home Setup  Type of Structure: House  Home Access: Level entry  Number of Levels in Home: Two level  Access to Alternate Level of Home: Stairs with rails  Alternate Level Stairs - Number of Steps: 14  Alternate Level Stairs - Rails: Right  Primary Bedroom: 1st floor  Primary Bathroom: 1st floor  Kitchen: 1st floor    Equipment/Treatments  Mobility Equipment: Straight cane  Current Home Medical Treatments: Blood glucose monitoring        Employment  Employment Status: Employed part-time   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  This involves walking over uneven surfaces including rocks, inclines, etc.     Past Medical History/Physical Systems Review:   Ciaran Canchola  has a past medical history of Depression, Diabetes mellitus, Hyperlipidemia, Hypertension, and Hypertensive retinopathy of both eyes.    Ciaran Canchola  has a past surgical history that includes Cholecystectomy; Tonsillectomy; Umbilical hernia repair; Colonoscopy (2018); Cataract extraction w/  intraocular lens implant (Left, 2/7/2024); Cataract extraction w/  intraocular lens implant (Right, 3/13/2024); and Knee Arthroplasty (Right, 3/12/2025).    Ciaran has a current medication list which includes the following prescription(s): alcohol swabs, blood sugar diagnostic, blood-glucose meter, celecoxib, jardiance, esomeprazole, hydrocodone-acetaminophen, lancets, lisinopril-hydrochlorothiazide, meloxicam, rosuvastatin, and sertraline.    Review of patient's  allergies indicates:  No Known Allergies     Objective   Bracing  Patient presents with a Right knee brace.    Steri-strips in place infrapatellar region        Posture                 Standing: weight shifted toward L with minimal R knee flexion. Sitting: unremarkable    Knee Observations  Right Knee Observations  Present: Edema and Incision  Right Knee Incision Observations  Present: Clean, Dry, and Drainage     Steri-strips in place over inferior 1/3 of incision.     Lower Extremity Sensation  Right Lumbar/Lower Extremity Sensation  Intact: Light Touch       Left Lumbar/Lower Extremity Sensation  Intact: Light Touch       No deficits reported at this time        Lower Extremity Reflex Details  Not applicable        Pulses  Ankle/Foot Pulses  Right Dorsalis Pedis Pulse: Moderate  Left Dorsalis Pedis Pulse: Moderate  Right Posterior Tibial Pulse: Moderate  Left Posterior Tibial Pulse: Moderate         Knee Swelling  Location of Measurement Right  (cm) Left  (cm)   20 cm Above Joint Line 55.8 52   10 cm Vastus Medialis Oblique 52.1 46.2   At Joint Line 43 38.7   15 cm Below Joint Line 41.9 41            Knee Palpation     Tenderness present R knee medial and lateral joint line. Scar with decreased mobility, particularly along distal 1/2 of incision.                   Hip Range of Motion    B hips grossly WNL except R hip external rotation which is somewhat limited.     Knee Range of Motion   Right Knee   Active (deg) Passive (deg) Pain   Flexion 105 112     Extension -10 -6         Left Knee   Active (deg) Passive (deg) Pain   Flexion 130 135     Extension 0 0              Ankle/Foot Range of Motion      B ankles grossly WNL               Hip Strength - Planes of Motion   Right Strength Right Pain Left Strength Left  Pain   Flexion (L2) 4   4+     Extension 4-   4     ABduction 4   4+     ADduction 4+   4+     Internal Rotation 4+   4+     External Rotation 4-   4+         Knee Strength   Right Strength Right Pain  Left Strength Left  Pain   Flexion (S2) 4   4+     Prone Flexion           Extension (L3) 4   4+            Ankle/Foot Strength - Planes of Motion   Right Strength Right Pain Left Strength Left  Pain   Dorsiflexion (L4) 4+   4+     Plantar Flexion (S1) 4+   4+     Inversion           Eversion           Great Toe Flexion           Great Toe Extension (L5)           Lesser Toes Flexion           Lesser Toes Extension                  Knee Patellar Screening  Right Patellar Static Positioning: Normal  Left Patellar Static Positioning: Normal    Right Patellar Mobility  Hypomobile: Medial, Lateral, Superior, and Inferior  Left Patellar Mobility  Normal: Medial, Lateral, Superior, and Inferior           Transfers Assessment  Sit to Stand Assistance: Independent  Sit to Stand Assistance Details: Moderate UE support and increased time required, decreased weight bearing through R foot.    Bed Mobility Assessment  Rolling Assistance: Independent  Sidelying to Sit Assistance: Independent  Sit to Sidelying Assistance: Independent  Scooting to Edge of Bed Assistance: Independent  Bridge/Boost to Head of Bed Assistance: Independent    Transfers/Bed Mobility Details  Patient requires increased UE support and increased time for bed mobility and transfers. Also requires increased time to perform.        Gait Analysis  Base of Support: Wide  Gait Pattern: Antalgic  Walking Speed: Decreased    Right Side Walking Observations  Increased: Swing Time  Decreased: Stance Time  Right Foot Contact Pattern: Flat foot    Left Side Walking Observations  Increased: Stance Time  Decreased: Swing Time and Step Length  Left Foot Contact Pattern: Heel to toe     Treatment:  Manual Therapy  MT 1: Scar mobilization, patellar mobilization  MT 2: Passive stretching into end range flexion and extension      Time Entry(in minutes):  PT Evaluation (Moderate) Time Entry: 36  Manual Therapy Time Entry: 8    Assessment & Plan   Assessment  Ciaran fishman  with a condition of Low complexity.   Presentation of Symptoms: Stable  Will Comorbidities Impact Care: No       Functional Limitations: Activity tolerance, Ambulating on uneven surfaces, Bed mobility, Functional mobility, Gait limitations, Pain with ADLs/IADLs, Painful locomotion/ambulation, Range of motion, Standing tolerance, Transfers, Carrying objects  Impairments: Abnormal gait, Abnormal muscle firing, Abnormal or restricted range of motion, Activity intolerance, Impaired physical strength, Pain with functional activity  Personal Factors Affecting Prognosis: Pain    Patient Goal for Therapy (PT): To walk better.  Prognosis: Good  Prognosis Details: Anticipated barriers to treatment: none  Assessment Details:  Ciaran is a 74 y.o. male referred to outpatient Physical Therapy with a medical diagnosis of S/P total knee arthroplasty, right. Patient presents with therapeutic diagnoses of impaired functional mobility/activity tolerance, impaired gait, decreased ROM R knee.  Additional problems include: impaired patellar mobility, decreased scar mobility, decreased LE flexibility, impaired strength.  These problems contribute to the following limitations: impaired gait quality/walking tolerance, difficulty with step negotiation, impaired sleep, difficulty performing home care and yard work activities, inability to perform work tasks.  Ciaran will benefit from skilled PT services to address these problems in order to normalize patellar and scar mobility, to improve knee ROM and LE strength, to minimize functional deficits, to maximize activity tolerance.      Plan  From a physical therapy perspective, the patient would benefit from: Skilled Rehab Services    Planned therapy interventions include: Therapeutic activities, Therapeutic exercise, Neuromuscular re-education, Manual therapy, and Gait training.    Planned modalities to include: Electrical stimulation - passive/unattended, Thermotherapy (hot pack), and  Cryotherapy (cold pack).        Visit Frequency: 2 times Per Week for 6 Weeks.       This plan was discussed with Patient.   Discussion participants: Agreed Upon Plan of Care  Plan details: [place] may be treated by PTA as part of their rehab team.       Patient's spiritual, cultural, and educational needs considered and patient agreeable to plan of care and goals.     Education  Education was done with Patient. The patient's learning style includes Listening. The patient Verbalizes understanding.         Educated patient in role PT and proposed POC.       Goals:   Active       Long Term Goals       Facilitate improved LE flexibility       Start:  04/02/25    Expected End:  05/19/25            Patient will consistently ambulate over level and uneven surfaces demonstrating equal weight shift, adequate foot clearance and appropriate knee flexion       Start:  04/02/25    Expected End:  05/19/25            Patient will consistently navigate steps utilizing reciprocating gait pattern with no more than minimal UE support       Start:  04/02/25    Expected End:  05/19/25            Patient will resume his usual yard work activities       Start:  04/02/25    Expected End:  05/19/25            Patient will consistently perform sit <> stand transfers with equal weight bearing and good pace.        Start:  04/02/25    Expected End:  05/19/25               Long Term Goals continued       Improve functional score to > 75% as indicated by FOTO knee survey       Start:  04/02/25    Expected End:  05/19/25            Patient will be independent in HEP.        Start:  04/02/25    Expected End:  05/19/25               Short Term Goals       Facilitate improved patellar mobility       Start:  04/02/25    Expected End:  04/24/25            Facilitate improved scar mobility       Start:  04/02/25    Expected End:  04/24/25            Patient will navigate at least 6 steps utilizing reciprocating gait pattern with minimal UE support        Start:  04/02/25    Expected End:  04/24/25            Patient will achieve terminal knee extension on R during gait activities.        Start:  04/02/25    Expected End:  04/24/25                Brielle Starr PT

## 2025-04-03 DIAGNOSIS — F33.42 RECURRENT MAJOR DEPRESSIVE DISORDER, IN FULL REMISSION: ICD-10-CM

## 2025-04-03 DIAGNOSIS — K21.9 GASTROESOPHAGEAL REFLUX DISEASE, UNSPECIFIED WHETHER ESOPHAGITIS PRESENT: ICD-10-CM

## 2025-04-03 RX ORDER — ESOMEPRAZOLE MAGNESIUM 40 MG/1
40 CAPSULE, DELAYED RELEASE ORAL
Qty: 90 CAPSULE | Refills: 2 | Status: SHIPPED | OUTPATIENT
Start: 2025-04-03

## 2025-04-03 NOTE — TELEPHONE ENCOUNTER
Care Due:                  Date            Visit Type   Department     Provider  --------------------------------------------------------------------------------                                             [facility]                              [facility]   [address]  Last Visit: 11-      PATIENT      [facility] Anne Argueta                               -         [facility] [address]  Next Visit: 05-      CARE ([facility])   [facility] Anne Argueta                                                            Last  Test          Frequency    Reason                     Performed    Due Date  --------------------------------------------------------------------------------    eGFR........  12 months..  empagliflozin............  Not Found    Overdue    [employer] Embedded Care Due Messages. Reference number: [id number].   4/03/2025 12:58:19 AM CDT

## 2025-04-03 NOTE — TELEPHONE ENCOUNTER
Provider Staff:  Action required for this patient    Requires labs      Please see care gap opportunities below in Care Due Message.    Thanks!  [facility]     Appointments      Date Provider   Last Visit   11/20/2024 Obie Argueta MD   Next Visit   5/20/2025 Obie Argueta MD     Refill Decision Note   Ciaran Canchola  is requesting a refill authorization.  Brief Assessment and Rationale for Refill:  Approve     Medication Therapy Plan:        Comments:     Note composed:5:27 AM 04/03/2025

## 2025-04-04 ENCOUNTER — CLINICAL SUPPORT (OUTPATIENT)
Dept: REHABILITATION | Facility: HOSPITAL | Age: 75
End: 2025-04-04
Attending: ORTHOPAEDIC SURGERY
Payer: MEDICARE

## 2025-04-04 DIAGNOSIS — M25.661 DECREASED ROM OF RIGHT KNEE: ICD-10-CM

## 2025-04-04 DIAGNOSIS — R26.9 GAIT DIFFICULTY: ICD-10-CM

## 2025-04-04 DIAGNOSIS — Z74.09 IMPAIRED FUNCTIONAL MOBILITY AND ACTIVITY TOLERANCE: Primary | ICD-10-CM

## 2025-04-04 PROCEDURE — 97530 THERAPEUTIC ACTIVITIES: CPT | Mod: PN,CQ

## 2025-04-04 PROCEDURE — 97110 THERAPEUTIC EXERCISES: CPT | Mod: PN,CQ

## 2025-04-04 RX ORDER — SERTRALINE HYDROCHLORIDE 50 MG/1
TABLET, FILM COATED ORAL
Qty: 180 TABLET | Refills: 0 | Status: SHIPPED | OUTPATIENT
Start: 2025-04-04

## 2025-04-04 NOTE — PROGRESS NOTES
"  Outpatient Rehab    Physical Therapy Visit    Patient Name: Ciaran Canchola  MRN: [id number]  YOB: 1950  Encounter Date: 4/4/2025    Therapy Diagnosis:   Encounter Diagnoses   Name Primary?    Impaired functional mobility and activity tolerance Yes    Gait difficulty     Decreased ROM of right knee      Physician: Tay Alonso,*    Physician Orders: Eval and Treat  Medical Diagnosis: S/P total knee arthroplasty, right    Visit # / Visits Authorized: 1 / 10 (2 total)  Insurance Authorization Period: 4/2/2025 to 6/10/2025  Date of Evaluation: 4/1/2025   Plan of Care Certification: 4/1/2025 to 5/19/2025     PT/PTA: PTA   Number of PTA visits since last PT visit: 1  Time In: 1012   Time Out: 1115  Total Time: 63   Total Billable Time: 55 minutes due to overlapping w/another pt    FOTO:  Intake Score: 59%  Survey Score 1:  %  Survey Score 2:  %    Precautions     Standard following TKA; Cardiac co-morbidities      Subjective   "I would say I am doing pretty good." Post previous: "I was okay.".  Pain reported as 2/10. "more of the R hand side" of R knee    Objective       Knee Range of Motion    R knee ROM using 2#: extension AROM lacking 5* TKE to 116* AAROM knee flexion           Treatment:  Therapeutic Exercise  TE 1: Recumbent bike seat @4 x6' (lvl2 x4' during)  TE 2: Standing: Gastroc stretches using wedge 3x30s, R soleus stretch using wedge 3x30s, R hamstring curl using 2# x15  TE 3: Shuttle leg press x15 ea: Double using 62#, R single using 37#  TE 4: Seated: Hamstring stretch 3x30s ea, Piriformis stretches 3x30s ea, R LAQ using 2# x15  TE 5: Supine: R Hip flexor stretches 3x30s, R SLR using 2# x10 (stressing TKE), Propping into TKE using bolster under ankle & 2# above knee x3', R Heel slide AAROM x20  TE 6: Sidelying: R hip abduction using 2# x10  TE 7: Prone: R hip extension using 2# x10  Manual Therapy  MT 1: Scar mobilization  Therapeutic Activity  TA 1: Mini squats x15  TA 2: R " "closed chain TKE on 4" step x15, Step up & over 4" step using R as power x10 ea way, Lateral step up & over 4" step x10 ea way    Time Entry(in minutes):  Manual Therapy Time Entry: 3  Therapeutic Activity Time Entry: 8  Therapeutic Exercise Time Entry: 52    Assessment & Plan   Assessment: The patient reported to physical therapy stating minimal knee pain following medication. Ciaran Canchola was instructed in initial treatment program targeting functional flexibility, mobility, knee range of motion, strength, stability, endurance, and performance to aide in activities of daily living. Slight verbal and tactile cues required for technique. Patient required a couple of rest breaks during treatment. Knee range of motion gradually improving. Minimal edema noted. Incision is healing well with minimal wrippling and gradually reducing mobility restrictions. Patient deferred modalities post, stating he would utilize at home.  Evaluation/Treatment Tolerance: Patient tolerated treatment well    Patient will continue to benefit from skilled outpatient physical therapy to address the deficits listed in the problem list box on initial evaluation, provide pt/family education and to maximize pt's level of independence in the home and community environment.     Patient's spiritual, cultural, and educational needs considered and patient agreeable to plan of care and goals.     Education  Education was done with Patient. The patient's learning style includes Demonstration, Listening, and Tactile. The patient Demonstrates understanding and Verbalizes understanding.         The patient was instructed in initial treatment program, and reviewed rehabilitation process. Reviewed stretching to aide in functional mobility/affected knee range of motion. Education was provided on scar tissue mobilizations, and possible use of kinesiology tape to address scar formation and mobility.       Plan: POC: 2 times Per Week for 6 Weeks. Therapeutic " activities, Therapeutic exercise, Neuromuscular re-education, Manual therapy, Gait training, Electrical stimulation - passive/unattended, Thermotherapy (hot pack), and Cryotherapy (cold pack). [place] may be treated by PTA as part of their rehab team.  The patient is to be progressed within the established plan of care as tolerated in order to accomplish stated goals. Plan to increase step height in subsequent session, and gradually increase strength and stability training as tolerated. Stress affected knee range of motion. Provide formal home exercise program subsequently.    Goals:   Active       Long Term Goals       Facilitate improved LE flexibility (Progressing)       Start:  04/02/25    Expected End:  05/19/25            Patient will consistently ambulate over level and uneven surfaces demonstrating equal weight shift, adequate foot clearance and appropriate knee flexion (Progressing)       Start:  04/02/25    Expected End:  05/19/25            Patient will consistently navigate steps utilizing reciprocating gait pattern with no more than minimal UE support (Progressing)       Start:  04/02/25    Expected End:  05/19/25            Patient will resume his usual yard work activities (Ongoing)       Start:  04/02/25    Expected End:  05/19/25            Patient will consistently perform sit <> stand transfers with equal weight bearing and good pace.  (Progressing)       Start:  04/02/25    Expected End:  05/19/25               Long Term Goals continued       Improve functional score to > 75% as indicated by FOTO knee survey (Ongoing)       Start:  04/02/25    Expected End:  05/19/25            Patient will be independent in HEP.  (Ongoing)       Start:  04/02/25    Expected End:  05/19/25               Short Term Goals       Facilitate improved patellar mobility (Progressing)       Start:  04/02/25    Expected End:  04/24/25            Facilitate improved scar mobility (Progressing)       Start:  04/02/25     Expected End:  04/24/25            Patient will navigate at least 6 steps utilizing reciprocating gait pattern with minimal UE support (Progressing)       Start:  04/02/25    Expected End:  04/24/25            Patient will achieve terminal knee extension on R during gait activities.  (Progressing)       Start:  04/02/25    Expected End:  04/24/25                Polly Marin, PTA

## 2025-04-09 ENCOUNTER — CLINICAL SUPPORT (OUTPATIENT)
Dept: REHABILITATION | Facility: HOSPITAL | Age: 75
End: 2025-04-09
Attending: ORTHOPAEDIC SURGERY
Payer: MEDICARE

## 2025-04-09 DIAGNOSIS — Z74.09 IMPAIRED FUNCTIONAL MOBILITY AND ACTIVITY TOLERANCE: Primary | ICD-10-CM

## 2025-04-09 DIAGNOSIS — R26.9 GAIT DIFFICULTY: ICD-10-CM

## 2025-04-09 DIAGNOSIS — M25.661 DECREASED ROM OF RIGHT KNEE: ICD-10-CM

## 2025-04-09 PROCEDURE — 97110 THERAPEUTIC EXERCISES: CPT | Mod: PN

## 2025-04-09 PROCEDURE — 97530 THERAPEUTIC ACTIVITIES: CPT | Mod: PN

## 2025-04-09 NOTE — PROGRESS NOTES
"  Outpatient Rehab    Physical Therapy Visit    Patient Name: Ciaran Canchola  MRN: [id number]  YOB: 1950  Encounter Date: 4/9/2025    Therapy Diagnosis:   Encounter Diagnoses   Name Primary?    Impaired functional mobility and activity tolerance Yes    Gait difficulty     Decreased ROM of right knee      Physician: Tay Alonso,*    Physician Orders: Eval and Treat  Medical Diagnosis: S/P total knee arthroplasty, right    Visit # / Visits Authorized:  2 / 10  Insurance Authorization Period: 4/2/2025 to 6/10/2025  Date of Evaluation: 4/1/2025  Plan of Care Certification: 4/1/2025 to 5/19/2025      PT/PTA: PTA   Number of PTA visits since last PT visit:1  Time In: 1335   Time Out: 1440  Total Time: 65   Total Billable Time: 58    FOTO:  Intake Score: 59%  Survey Score 1:  %  Survey Score 2:  %    Precautions  Right Lower Extremity Weight-Bearing Status: Full weight-bearing  Standard following TKA; Cardiac co-morbidities      Subjective   "It's pretty stiff and sore.".  Pain reported as 4/10. "Sore on the outside"    Objective          Treatment:  Therapeutic Exercise  TE 1: Recumbent bike seat @5 x6'  TE 2: Standing: Gastroc stretches using wedge 3x30s, R soleus stretch using wedge 3x30s, R hamstring curl using 3# x20  TE 3: Shuttle leg press x20 ea: Double using 62#, R single using 37#  TE 4: Seated: Hamstring stretch 3x30s ea, Modified piriformis stretches 3x30s ea, R LAQ using 3# x 20  TE 5: Supine: R Hip flexor stretches 3x30s, R SLR using 3# x10 (stressing TKE), Propping into TKE using bolster under ankle & 2# above knee x3', R Heel slide AAROM x20;  TE 6: Sidelying: R hip abduction using 3# x15  TE 7: Prone: R hip extension using 3# x15; hamstring curls using 3# wt x 20  Manual Therapy  MT 1: End range stretching into extension  Therapeutic Activity  TA 1: Mini squats x20  TA 2: R closed chain TKE on 4" step x205, Step up & over 6" step using R as power x10 ea way (3# wt on R), " "Lateral step up & over 6" step x10 ea way (3# wt on R)    Time Entry(in minutes):  Manual Therapy Time Entry: 3  Therapeutic Activity Time Entry: 8  Therapeutic Exercise Time Entry: 47    Assessment & Plan   Assessment: Ciaran presents to PT today reporting discomfort/stiffness.  He performed exercise program as noted with intermittent discomfort.  He tolerated increased resistance to PRE and increased step height for stepping activities.  He continues to exhibit restricted extension (-5* passively) and minimal restricted flexion (to 115*)  Evaluation/Treatment Tolerance: Patient tolerated treatment well    Patient will continue to benefit from skilled outpatient physical therapy to address the deficits listed in the problem list box on initial evaluation, provide pt/family education and to maximize pt's level of independence in the home and community environment.     Patient's spiritual, cultural, and educational needs considered and patient agreeable to plan of care and goals.     Education  Education was done with Patient. The patient's learning style includes Demonstration and Listening. The patient Demonstrates understanding and Verbalizes understanding.         Reviewed exercises as noted with verbal cues for technique. Instructed patient in static stretch to increase knee extension to be performed in long sitting.      Plan: The patient is to be progressed within the established plan of care as tolerated in order to accomplish stated goals. Plan to continue increasing resistance to PRE as patient tolerates. Gait training over uneven surfaces progressing to multiple stairs. Provide formal home exercise program instructions.    Goals:   Active       Long Term Goals       Facilitate improved LE flexibility (Progressing)       Start:  04/02/25    Expected End:  05/19/25            Patient will consistently ambulate over level and uneven surfaces demonstrating equal weight shift, adequate foot clearance and " appropriate knee flexion (Progressing)       Start:  04/02/25    Expected End:  05/19/25            Patient will consistently navigate steps utilizing reciprocating gait pattern with no more than minimal UE support (Progressing)       Start:  04/02/25    Expected End:  05/19/25            Patient will resume his usual yard work activities (Not Progressing)       Start:  04/02/25    Expected End:  05/19/25            Patient will consistently perform sit <> stand transfers with equal weight bearing and good pace.  (Progressing)       Start:  04/02/25    Expected End:  05/19/25               Long Term Goals continued       Improve functional score to > 75% as indicated by FOTO knee survey (Ongoing)       Start:  04/02/25    Expected End:  05/19/25            Patient will be independent in HEP.  (Progressing)       Start:  04/02/25    Expected End:  05/19/25               Short Term Goals       Facilitate improved patellar mobility (Progressing)       Start:  04/02/25    Expected End:  04/24/25            Facilitate improved scar mobility (Progressing)       Start:  04/02/25    Expected End:  04/24/25            Patient will navigate at least 6 steps utilizing reciprocating gait pattern with minimal UE support (Progressing)       Start:  04/02/25    Expected End:  04/24/25            Patient will achieve terminal knee extension on R during gait activities.  (Progressing)       Start:  04/02/25    Expected End:  04/24/25                Brielle Starr PT

## 2025-04-11 ENCOUNTER — CLINICAL SUPPORT (OUTPATIENT)
Dept: REHABILITATION | Facility: HOSPITAL | Age: 75
End: 2025-04-11
Attending: ORTHOPAEDIC SURGERY
Payer: MEDICARE

## 2025-04-11 DIAGNOSIS — R26.9 GAIT DIFFICULTY: ICD-10-CM

## 2025-04-11 DIAGNOSIS — Z74.09 IMPAIRED FUNCTIONAL MOBILITY AND ACTIVITY TOLERANCE: Primary | ICD-10-CM

## 2025-04-11 DIAGNOSIS — M25.661 DECREASED ROM OF RIGHT KNEE: ICD-10-CM

## 2025-04-11 PROCEDURE — 97530 THERAPEUTIC ACTIVITIES: CPT | Mod: PN,CQ

## 2025-04-11 PROCEDURE — 97110 THERAPEUTIC EXERCISES: CPT | Mod: PN,CQ

## 2025-04-11 NOTE — PROGRESS NOTES
"  Outpatient Rehab    Physical Therapy Visit    Patient Name: Ciarna Canchola  MRN: [id number]  YOB: 1950  Encounter Date: 4/11/2025    Therapy Diagnosis:   Encounter Diagnoses   Name Primary?    Impaired functional mobility and activity tolerance Yes    Gait difficulty     Decreased ROM of right knee      Physician: Tay Alonso,*    Physician Orders: Eval and Treat  Medical Diagnosis: S/P total knee arthroplasty, right    Visit # / Visits Authorized:  3 / 10  Insurance Authorization Period: 4/2/2025 to 6/10/2025  Date of Evaluation: 4/1/2025  Plan of Care Certification: 4/1/2025 to 5/19/2025     PT/PTA: PTA   Number of PTA visits since last PT visit: 1  Time In: 1340   Time Out: 1452  Total Time: 72   Total Billable Time: 46 minutes due to being doubled    FOTO:  Intake Score: 59%  Survey Score 1:  %  Survey Score 2:  %    Precautions     Standard following TKA; Cardiac co-morbidities      Subjective   "It's a little." Post previous: "I had to go home and ice, and I took it a little easy.".  Pain reported as 2/10. Right Knee    Objective       Knee Range of Motion   Right Knee   Active (deg) Passive (deg) Pain   Flexion 116       Extension -9                       Treatment:  Therapeutic Exercise  TE 1: Recumbent bike seat @5 x6'  TE 2: Standing: Gastroc stretches using wedge 3x30s, R soleus stretch using wedge 3x30s, R hamstring curl using 3# x20  TE 3: Shuttle leg press x20 ea: Double using 75#, R single using 37#  TE 4: Seated: Hamstring stretch 3x30s ea, Modified piriformis stretches 3x30s ea, R LAQ using 3# x20, R TKE w/SLR using 2# x20  TE 5: Supine: R Hip flexor stretches x1.5', R SLR using 3# x20 (stressing TKE), R SAQ using 3# x20, R Heel slide AAROM x20  TE 6: Sidelying: R hip abduction using 3# x20  TE 7: Prone: R hip extension using 3# x20, Propping into TKE w/bolster under ankle using 3# x3'  Therapeutic Activity  TA 1: Mini squats x20  TA 2: R closed chain TKE on 6" step " "x20, Step up & over 6" step using R as power x10 ea way (3# wt on R), Lateral step up & over 6" step x10 ea way (3# wt on R)  Modalities  Cryotherapy (Minutes\Location): Provided to right knee propped into terminal knee extension for 6 minutes    Time Entry(in minutes):  Hot/Cold Pack Time Entry: 6  Therapeutic Activity Time Entry: 8  Therapeutic Exercise Time Entry: 54    Assessment & Plan   Assessment: The patient reported to physical therapy stating slight knee discomfort. Ciaran Jesika was progressed slightly with increased functional strength, stability, flexibility, and knee range of motion training. Patient demonstrates slightly regressed terminal knee extension and minimal increased knee flexion. The patient requested cold pack following treatment, but discontinued due to excessive stretch into posterior knee.  Evaluation/Treatment Tolerance: Patient tolerated treatment well    Patient will continue to benefit from skilled outpatient physical therapy to address the deficits listed in the problem list box on initial evaluation, provide pt/family education and to maximize pt's level of independence in the home and community environment.     Patient's spiritual, cultural, and educational needs considered and patient agreeable to plan of care and goals.     Education  Education was done with Patient. The patient's learning style includes Demonstration, Listening, and Tactile. The patient Demonstrates understanding and Verbalizes understanding.         The patient was instructed in slight progressions as noted. Reviewed importance of propping into terminal knee extension to stretch.       Plan: POC: 2 times Per Week for 6 Weeks. Therapeutic activities, Therapeutic exercise, Neuromuscular re-education, Manual therapy, Gait training, Electrical stimulation - passive/unattended, Thermotherapy (hot pack), and Cryotherapy (cold pack). Ciaran may be treated by PTA as part of their rehab team.  The patient is to be " progressed within the established plan of care as tolerated in order to accomplish stated goals. Plan to increase step height subsequently. Continue to stress terminal knee extension. Provide formal home exercise program.    Goals:   Active       Long Term Goals       Facilitate improved LE flexibility (Progressing)       Start:  04/02/25    Expected End:  05/19/25            Patient will consistently ambulate over level and uneven surfaces demonstrating equal weight shift, adequate foot clearance and appropriate knee flexion (Progressing)       Start:  04/02/25    Expected End:  05/19/25            Patient will consistently navigate steps utilizing reciprocating gait pattern with no more than minimal UE support (Progressing)       Start:  04/02/25    Expected End:  05/19/25            Patient will resume his usual yard work activities (Ongoing)       Start:  04/02/25    Expected End:  05/19/25            Patient will consistently perform sit <> stand transfers with equal weight bearing and good pace.  (Progressing)       Start:  04/02/25    Expected End:  05/19/25               Long Term Goals continued       Improve functional score to > 75% as indicated by FOTO knee survey (Ongoing)       Start:  04/02/25    Expected End:  05/19/25            Patient will be independent in HEP.  (Ongoing)       Start:  04/02/25    Expected End:  05/19/25               Short Term Goals       Facilitate improved patellar mobility (Progressing)       Start:  04/02/25    Expected End:  04/24/25            Facilitate improved scar mobility (Progressing)       Start:  04/02/25    Expected End:  04/24/25            Patient will navigate at least 6 steps utilizing reciprocating gait pattern with minimal UE support (Progressing)       Start:  04/02/25    Expected End:  04/24/25            Patient will achieve terminal knee extension on R during gait activities.  (Progressing)       Start:  04/02/25    Expected End:  04/24/25                 Polly Marin, PTA

## 2025-04-14 ENCOUNTER — CLINICAL SUPPORT (OUTPATIENT)
Dept: REHABILITATION | Facility: HOSPITAL | Age: 75
End: 2025-04-14
Attending: ORTHOPAEDIC SURGERY
Payer: MEDICARE

## 2025-04-14 DIAGNOSIS — Z74.09 IMPAIRED FUNCTIONAL MOBILITY AND ACTIVITY TOLERANCE: Primary | ICD-10-CM

## 2025-04-14 DIAGNOSIS — R26.9 GAIT DIFFICULTY: ICD-10-CM

## 2025-04-14 DIAGNOSIS — M25.661 DECREASED ROM OF RIGHT KNEE: ICD-10-CM

## 2025-04-14 PROCEDURE — 97530 THERAPEUTIC ACTIVITIES: CPT | Mod: PN

## 2025-04-14 PROCEDURE — 97110 THERAPEUTIC EXERCISES: CPT | Mod: PN

## 2025-04-14 NOTE — PROGRESS NOTES
"  Outpatient Rehab    Physical Therapy Visit    Patient Name: Ciaran Canchola  MRN: [id number]  YOB: 1950  Encounter Date: 4/14/2025    Therapy Diagnosis:   Encounter Diagnoses   Name Primary?    Impaired functional mobility and activity tolerance Yes    Gait difficulty     Decreased ROM of right knee      Physician: Tay Alonso,*    Physician Orders: Eval and Treat  Medical Diagnosis: S/P total knee arthroplasty, right    Visit # / Visits Authorized:  4 / 10  Insurance Authorization Period: 4/2/2025 to 6/10/2025  Date of Evaluation: 4/1/2025  Plan of Care Certification: 4/1/2025 to 5/19/2025     PT/PTA:     Number of PTA visits since last PT visit:    Time In: 1100   Time Out: 1153  Total Time: 53   Total Billable Time: 53 minutes due to being doubled    FOTO:  Intake Score:  %  Survey Score 1:  %  Survey Score 2:  %         Subjective   He tweaked his knee this morning when putting his socks on, so pain is a little higher today..  Pain reported as 4/10. Right Knee    Objective            Treatment:  Therapeutic Exercise  TE 1: Recumbent bike seat @5 x6', level 2  TE 2: Standing: Gastroc stretches using wedge 3x30s, R soleus stretch using wedge 3x30s, R hamstring curl using 3# x20  TE 3: Shuttle leg press x20 ea: Double using 75#, R single using 37#  TE 4: Seated: Hamstring stretch 3x30s ea, Modified piriformis stretches 3x30s ea, R LAQ using 3# x20, R TKE w/SLR using 0# x20  TE 5: Supine: R Hip flexor stretches x1.5', R SLR using 0# x20 (stressing TKE), R SAQ using 3# x20,  TE 6: Sidelying: R hip abduction using 0# x10  Therapeutic Activity  TA 1: Mini squats x20  TA 2: R closed chain TKE on 6" step x20, Step up & over 6" step using R as power x10 ea way (3# wt on R), Lateral step up & over 6" step x10 ea way (3# wt on R)    Time Entry(in minutes):  Therapeutic Activity Time Entry: 15  Therapeutic Exercise Time Entry: 38    Assessment & Plan   Assessment: Patient struggled with several " exercises today likely due to increased pain. He demonstrated extension lag today with SLR therefore removed ankle weight. Unable to complete all exercises due to fatigue and pain.       Patient will continue to benefit from skilled outpatient physical therapy to address the deficits listed in the problem list box on initial evaluation, provide pt/family education and to maximize pt's level of independence in the home and community environment.     Patient's spiritual, cultural, and educational needs considered and patient agreeable to plan of care and goals.             Plan: POC: 2 times Per Week for 6 Weeks. Therapeutic activities, Therapeutic exercise, Neuromuscular re-education, Manual therapy, Gait training, Electrical stimulation - passive/unattended, Thermotherapy (hot pack), and Cryotherapy (cold pack). [place] may be treated by PTA as part of their rehab team.  Continue to advance patient as tolerated per POC for progress toward LTGs    Goals:   Active       Long Term Goals       Facilitate improved LE flexibility (Progressing)       Start:  04/02/25    Expected End:  05/19/25            Patient will consistently ambulate over level and uneven surfaces demonstrating equal weight shift, adequate foot clearance and appropriate knee flexion (Progressing)       Start:  04/02/25    Expected End:  05/19/25            Patient will consistently navigate steps utilizing reciprocating gait pattern with no more than minimal UE support (Progressing)       Start:  04/02/25    Expected End:  05/19/25            Patient will resume his usual yard work activities (Ongoing)       Start:  04/02/25    Expected End:  05/19/25            Patient will consistently perform sit <> stand transfers with equal weight bearing and good pace.  (Progressing)       Start:  04/02/25    Expected End:  05/19/25               Long Term Goals continued       Improve functional score to > 75% as indicated by FOTO knee survey (Ongoing)        Start:  04/02/25    Expected End:  05/19/25            Patient will be independent in HEP.  (Ongoing)       Start:  04/02/25    Expected End:  05/19/25               Short Term Goals       Facilitate improved patellar mobility (Progressing)       Start:  04/02/25    Expected End:  04/24/25            Facilitate improved scar mobility (Progressing)       Start:  04/02/25    Expected End:  04/24/25            Patient will navigate at least 6 steps utilizing reciprocating gait pattern with minimal UE support (Progressing)       Start:  04/02/25    Expected End:  04/24/25            Patient will achieve terminal knee extension on R during gait activities.  (Progressing)       Start:  04/02/25    Expected End:  04/24/25                Nicole Maxwell, PT

## 2025-04-16 ENCOUNTER — CLINICAL SUPPORT (OUTPATIENT)
Dept: REHABILITATION | Facility: HOSPITAL | Age: 75
End: 2025-04-16
Attending: ORTHOPAEDIC SURGERY
Payer: MEDICARE

## 2025-04-16 DIAGNOSIS — R26.9 GAIT DIFFICULTY: ICD-10-CM

## 2025-04-16 DIAGNOSIS — M25.661 DECREASED ROM OF RIGHT KNEE: ICD-10-CM

## 2025-04-16 DIAGNOSIS — Z74.09 IMPAIRED FUNCTIONAL MOBILITY AND ACTIVITY TOLERANCE: Primary | ICD-10-CM

## 2025-04-16 PROCEDURE — 97110 THERAPEUTIC EXERCISES: CPT | Mod: PN,CQ

## 2025-04-16 PROCEDURE — 97530 THERAPEUTIC ACTIVITIES: CPT | Mod: PN,CQ

## 2025-04-16 NOTE — PROGRESS NOTES
"  Outpatient Rehab    Physical Therapy Visit    Patient Name: Ciaran Canchola  MRN: [id number]  YOB: 1950  Encounter Date: 4/16/2025    Therapy Diagnosis:   Encounter Diagnoses   Name Primary?    Impaired functional mobility and activity tolerance Yes    Gait difficulty     Decreased ROM of right knee      Physician: Tay Alonso,*    Physician Orders: Eval and Treat  Medical Diagnosis: S/P total knee arthroplasty, right    Visit # / Visits Authorized: 5 / 10 (6 total)  Insurance Authorization Period: 4/2/2025 to 6/10/2025  Date of Evaluation: 4/1/2025   Plan of Care Certification: 4/1/2025 to 5/19/2025\     PT/PTA: PTA   Number of PTA visits since last PT visit: 1  Time In: 1002   Time Out: 1115  Total Time: 73   Total Billable Time: 36 minutes due to being doubled    FOTO:  Intake Score: 59%  Survey Score 1:  %  Survey Score 2:  %    Precautions     Standard following TKA; Cardiac co-morbidities      Subjective   "It's a little."-pain/soreness..  Pain reported as 3/10. 3-4/10 R knee    Objective            Treatment:  Therapeutic Exercise  TE 1: Recumbent bike seat @5 lvl2 x6'  TE 2: Standing: Gastroc stretches using wedge 3x30s, R soleus stretch using wedge 3x30s, R hamstring curl using 3# x20  TE 3: Shuttle leg press x20 ea: Double using 75#, R single using 37#  TE 4: Seated: Hamstring stretch 3x30s ea, Modified piriformis stretches 3x30s ea, R LAQ using 3# x20, R TKE w/SLR using 3# x20  TE 5: Supine: R Hip flexor stretches x1.5', R SLR using 3# x20 (stressing TKE), R SAQ using 3# x20, Heel slide AAROM using strap 20x3s, Propping into TKE w/bolster under ankle using 3# x3'  TE 6: Sidelying: R hip abduction using 3# x20  TE 7: Prone: R hip extension using 3# x20  Therapeutic Activity  TA 1: Mini squats x20  TA 2: R closed chain TKE on 8" step x20, Step up & over 8" step using R as power x10 ea way (3# wt on R), Lateral step up & over 8" step x10 ea way (3# wt on R)    Time Entry(in " minutes):  Therapeutic Activity Time Entry: 10  Therapeutic Exercise Time Entry: 60    Assessment & Plan   Assessment: The patient reported to physical therapy stating minimal to moderate affected knee pain. Reduced edma noted on this date. Ciaran Canchola was progressed with increased functional strength, stability, performance, and endurance training. Patient continues to slightly lack full terminal knee extension at this time; gravity assisted stretching into knee extension on this date. However, the patient may benefit from further gravity assisted by transitioning to prone knee hang during low load-long duration stretching.  Evaluation/Treatment Tolerance: Patient tolerated treatment well    Patient will continue to benefit from skilled outpatient physical therapy to address the deficits listed in the problem list box on initial evaluation, provide pt/family education and to maximize pt's level of independence in the home and community environment.     Patient's spiritual, cultural, and educational needs considered and patient agreeable to plan of care and goals.     Education  Education was done with Patient. The patient's learning style includes Demonstration, Listening, and Tactile. The patient Demonstrates understanding and Verbalizes understanding.         The patient was instructed in slight progressions as noted with moderate cues for technique.       Plan: POC: 2 times Per Week for 6 Weeks. Therapeutic activities, Therapeutic exercise, Neuromuscular re-education, Manual therapy, Gait training, Electrical stimulation - passive/unattended, Thermotherapy (hot pack), and Cryotherapy (cold pack). Ciaran may be treated by PTA as part of their rehab team.  The patient is to be progressed within the established plan of care as tolerated in order to accomplish stated goals. Plan to stress terminal knee extension; transition propping into extension with weight above knee to prone overhang low load-long duration  stretching as needed to aide in increased gravity assistance. Provide formal home exercise program in subsequent session, updated from home health.    Goals:   Active       Long Term Goals       Facilitate improved LE flexibility (Progressing)       Start:  04/02/25    Expected End:  05/19/25            Patient will consistently ambulate over level and uneven surfaces demonstrating equal weight shift, adequate foot clearance and appropriate knee flexion (Progressing)       Start:  04/02/25    Expected End:  05/19/25            Patient will consistently navigate steps utilizing reciprocating gait pattern with no more than minimal UE support (Progressing)       Start:  04/02/25    Expected End:  05/19/25            Patient will resume his usual yard work activities (Progressing)       Start:  04/02/25    Expected End:  05/19/25            Patient will consistently perform sit <> stand transfers with equal weight bearing and good pace.  (Progressing)       Start:  04/02/25    Expected End:  05/19/25               Long Term Goals continued       Improve functional score to > 75% as indicated by FOTO knee survey (Ongoing)       Start:  04/02/25    Expected End:  05/19/25            Patient will be independent in HEP.  (Ongoing)       Start:  04/02/25    Expected End:  05/19/25               Short Term Goals       Facilitate improved patellar mobility (Progressing)       Start:  04/02/25    Expected End:  04/24/25            Facilitate improved scar mobility (Progressing)       Start:  04/02/25    Expected End:  04/24/25            Patient will navigate at least 6 steps utilizing reciprocating gait pattern with minimal UE support (Progressing)       Start:  04/02/25    Expected End:  04/24/25            Patient will achieve terminal knee extension on R during gait activities.  (Progressing)       Start:  04/02/25    Expected End:  04/24/25                Polly Marin, PTA

## 2025-04-21 ENCOUNTER — CLINICAL SUPPORT (OUTPATIENT)
Dept: REHABILITATION | Facility: HOSPITAL | Age: 75
End: 2025-04-21
Attending: ORTHOPAEDIC SURGERY
Payer: MEDICARE

## 2025-04-21 DIAGNOSIS — M25.661 DECREASED ROM OF RIGHT KNEE: ICD-10-CM

## 2025-04-21 DIAGNOSIS — Z74.09 IMPAIRED FUNCTIONAL MOBILITY AND ACTIVITY TOLERANCE: Primary | ICD-10-CM

## 2025-04-21 DIAGNOSIS — R26.9 GAIT DIFFICULTY: ICD-10-CM

## 2025-04-21 PROCEDURE — 97110 THERAPEUTIC EXERCISES: CPT | Mod: PN,CQ

## 2025-04-21 PROCEDURE — 97530 THERAPEUTIC ACTIVITIES: CPT | Mod: PN,CQ

## 2025-04-21 NOTE — PROGRESS NOTES
"  Outpatient Rehab    Physical Therapy Visit    Patient Name: Ciaran Canchola  MRN: [id number]  YOB: 1950  Encounter Date: 4/21/2025    Therapy Diagnosis:   Encounter Diagnoses   Name Primary?    Impaired functional mobility and activity tolerance Yes    Gait difficulty     Decreased ROM of right knee      Physician: Tay Alonso,*    Physician Orders: Eval and Treat  Medical Diagnosis: S/P total knee arthroplasty, right    Visit # / Visits Authorized: 6 / 10 (7 total)  Insurance Authorization Period: 4/2/2025 to 6/10/2025  Date of Evaluation: 4/1/2025   Plan of Care Certification: 4/1/2025 to 5/19/2025     PT/PTA: PTA   Number of PTA visits since last PT visit: 2  Time In: 1115   Time Out: 1212  Total Time: 57   Total Billable Time: 39 minutes due to being doubled    FOTO:  Intake Score: 59%  Survey Score 1: 56%  Survey Score 2:  %    Precautions     Standard following TKA; Cardiac co-morbidities      Subjective   "I have been working it, and when I went to cross my leg to put my socks on my knee popped.".  Pain reported as 3/10. 2-3 R knee "It has gone down some."    Objective       Knee Range of Motion    R knee ROM using 4#: extension AROM lacking 4* TKE           Treatment:  Therapeutic Exercise  TE 1: Recumbent bike lvl2 x6' (seat @7)  TE 2: Standing: Gastroc stretches using wedge 3x30s, R soleus stretch using wedge 3x30s, R hamstring curl using 4# x20  TE 3: Shuttle leg press x20 ea: Double using 75#, R single using 50#  TE 4: Seated: Hamstring stretch 3x30s ea, R LAQ using 4# x20, R TKE w/SLR using 4# x20  TE 5: Supine: R Hip flexor stretches x1.5', R SLR using 3# x20 (stressing TKE), R SAQ using 4# x20, Propping into TKE w/bolster under ankle using 4# x4'  Therapeutic Activity  TA 1: Mini squats x20  TA 2: R closed chain TKE on 8" step x20, Step up & over 8" step using R as power x10 ea way (3# wt on R), Lateral step up & over 8" step x10 ea way (4# wt on R)    Time Entry(in " minutes):  Therapeutic Activity Time Entry: 10  Therapeutic Exercise Time Entry: 46    Assessment & Plan   Assessment: The patient reported to physical therapy stating he felt a pop and discomfort when dressing this morning (attempting to cross legs when donning socks); discomfort decreased later, prior to attending appointment. Patient was tardy due to concern of whether he should attend or not; called the clinic. PT believes it is possibility of scar tissue and discussed with patient. Ciaran Canchola was instructed in continued treatment program with slight strength, stability, and range of motion training. Due to tardiness the treatment program was not completed in it's entirety; however, advised patient to perform remaining exercises at home and utilize cold pack as needed for symptom management.  Evaluation/Treatment Tolerance: Patient tolerated treatment well    Patient will continue to benefit from skilled outpatient physical therapy to address the deficits listed in the problem list box on initial evaluation, provide pt/family education and to maximize pt's level of independence in the home and community environment.     Patient's spiritual, cultural, and educational needs considered and patient agreeable to plan of care and goals.     Education  Education was done with Patient. The patient's learning style includes Demonstration, Listening, and Tactile. The patient Demonstrates understanding and Verbalizes understanding.         The PT educated the patient on possibility of scar tissue causing the audible crepitus and discomfort when dressing this morning prior to treatment. The patient was instructed in continued treatment program with slight progressions, using tactile cues for technique. Advised patient to perform heel slide AAROM, sidelying, and prone exercises at home due to time constraints; provided updated home exercise program on this date.       Plan: POC: 2 times Per Week for 6 Weeks. Therapeutic  activities, Therapeutic exercise, Neuromuscular re-education, Manual therapy, Gait training, Electrical stimulation - passive/unattended, Thermotherapy (hot pack), and Cryotherapy (cold pack). Perry may be treated by PTA as part of their rehab team.  The patient is to be progressed within the established plan of care as tolerated in order to accomplish stated goals. Continue to stress affected range of motion, continuing heel slides and transition propping into extension with weight above knee to prone overhang low load-long duration stretching. If remains dormant, begin contract relax techniques. Plan to resume sidelying and prone strength, stability, and endurance training as well.    Goals:   Active       Long Term Goals       Facilitate improved LE flexibility (Progressing)       Start:  04/02/25    Expected End:  05/19/25            Patient will consistently ambulate over level and uneven surfaces demonstrating equal weight shift, adequate foot clearance and appropriate knee flexion (Progressing)       Start:  04/02/25    Expected End:  05/19/25            Patient will consistently navigate steps utilizing reciprocating gait pattern with no more than minimal UE support (Progressing)       Start:  04/02/25    Expected End:  05/19/25            Patient will resume his usual yard work activities (Progressing)       Start:  04/02/25    Expected End:  05/19/25            Patient will consistently perform sit <> stand transfers with equal weight bearing and good pace.  (Progressing)       Start:  04/02/25    Expected End:  05/19/25               Long Term Goals continued       Improve functional score to > 75% as indicated by FOTO knee survey (Not Progressing)       Start:  04/02/25    Expected End:  05/19/25            Patient will be independent in HEP.  (Progressing)       Start:  04/02/25    Expected End:  05/19/25               Short Term Goals       Facilitate improved patellar mobility (Progressing)        Start:  04/02/25    Expected End:  04/24/25            Facilitate improved scar mobility (Progressing)       Start:  04/02/25    Expected End:  04/24/25            Patient will navigate at least 6 steps utilizing reciprocating gait pattern with minimal UE support (Progressing)       Start:  04/02/25    Expected End:  04/24/25            Patient will achieve terminal knee extension on R during gait activities.  (Progressing)       Start:  04/02/25    Expected End:  04/24/25                Polly Marin, PTA

## 2025-04-23 ENCOUNTER — CLINICAL SUPPORT (OUTPATIENT)
Dept: REHABILITATION | Facility: HOSPITAL | Age: 75
End: 2025-04-23
Attending: ORTHOPAEDIC SURGERY
Payer: MEDICARE

## 2025-04-23 DIAGNOSIS — M25.661 DECREASED ROM OF RIGHT KNEE: ICD-10-CM

## 2025-04-23 DIAGNOSIS — Z74.09 IMPAIRED FUNCTIONAL MOBILITY AND ACTIVITY TOLERANCE: Primary | ICD-10-CM

## 2025-04-23 DIAGNOSIS — R26.9 GAIT DIFFICULTY: ICD-10-CM

## 2025-04-23 PROCEDURE — 97110 THERAPEUTIC EXERCISES: CPT | Mod: PN

## 2025-04-23 PROCEDURE — 97530 THERAPEUTIC ACTIVITIES: CPT | Mod: PN

## 2025-04-23 NOTE — PROGRESS NOTES
"  Outpatient Rehab    Physical Therapy Visit    Patient Name: Ciaran Canchola  MRN: [id number]  YOB: 1950  Encounter Date: 4/23/2025    Therapy Diagnosis:   Encounter Diagnoses   Name Primary?    Impaired functional mobility and activity tolerance Yes    Gait difficulty     Decreased ROM of right knee      Physician: Tay Alonso,*    Physician Orders: Eval and Treat  Medical Diagnosis: S/P total knee arthroplasty, right    Visit # / Visits Authorized:  7 / 10  Insurance Authorization Period: 4/2/2025 to 6/10/2025  Date of Evaluation: 4/1/2025  Plan of Care Certification: 4/1/2025 to 5/19/2025     PT/PTA: PT   Number of PTA visits since last PT visit:   Time In: 1103   Time Out: 1205  Total Time: 62   Total Billable Time: 54    FOTO:  Intake Score: 59%  Survey Score 1: 56%  Survey Score 2:  %    Precautions  Right Lower Extremity Weight-Bearing Status: Full weight-bearing  Standard following TKA; Cardiac co-morbidities      Subjective   "Since I had that problem the other day, it's been irritated.".  Pain reported as 4/10.      Objective          Treatment:  Therapeutic Exercise  TE 1: Recumbent bike lvl3 x6' (seat @5)  TE 2: Standing: Gastroc stretches using wedge 3x30s, B soleus stretch using wedge 3x30s, R hamstring curl using 3# x20  TE 3: Shuttle leg press x20 ea: Double using 75#, R single using 50#  TE 4: Seated: Hamstring stretch 3x30s ea, R LAQ using 3# x20, R TKE w/SLR using 3# x20  TE 5: Supine: R Hip flexor stretches x1.5', R SLR using 3# x20 (stressing TKE), R SAQ using 4# x20, Propping into TKE w/bolster under ankle using 3# x 4'  TE 6: Sidelying: R hip abduction using 3# x20  TE 7: Prone: R hip extension using 3# x20  Manual Therapy  MT 1: End range stretching into extension  MT 2: Scar tissue mobilization, patellar mobilization  Therapeutic Activity  TA 1: Mini squats x20  TA 2: R closed chain TKE on 8" step x20, Step up & over 8" step using R as power x10 ea way (3# wt on " "R), Lateral step up & over 8" step x10 ea way (4# wt on R)    Time Entry(in minutes):  Manual Therapy Time Entry: 5  Therapeutic Activity Time Entry: 15  Therapeutic Exercise Time Entry: 39    Assessment & Plan   Assessment: Ciaran presents to PT today reporting mild to moderate knee pain.  He participated in therapeutic exercises/activities as noted without significant increase in pain but he did report increased muscle fatigue.  He required frequent redirection to task to insure proper count of activities.  Evaluation/Treatment Tolerance: Patient tolerated treatment well    Patient will continue to benefit from skilled outpatient physical therapy to address the deficits listed in the problem list box on initial evaluation, provide pt/family education and to maximize pt's level of independence in the home and community environment.     Patient's spiritual, cultural, and educational needs considered and patient agreeable to plan of care and goals.     Education  Education was done with Patient. The patient's learning style includes Demonstration and Listening. The patient Demonstrates understanding and Verbalizes understanding.         Reviewed exercises as noted with intermittent verbal cues for technique.       Plan: The patient is to be progressed within the established plan of care as tolerated in order to accomplish stated goals. Add prone hang, prone quad sets. transition to multidirection treadmill activity.    Goals:   Active       Long Term Goals       Facilitate improved LE flexibility (Progressing)       Start:  04/02/25    Expected End:  05/19/25            Patient will consistently ambulate over level and uneven surfaces demonstrating equal weight shift, adequate foot clearance and appropriate knee flexion (Progressing)       Start:  04/02/25    Expected End:  05/19/25            Patient will consistently navigate steps utilizing reciprocating gait pattern with no more than minimal UE support (Ongoing)  "      Start:  04/02/25    Expected End:  05/19/25            Patient will resume his usual yard work activities (Progressing)       Start:  04/02/25    Expected End:  05/19/25            Patient will consistently perform sit <> stand transfers with equal weight bearing and good pace.  (Progressing)       Start:  04/02/25    Expected End:  05/19/25               Long Term Goals continued       Improve functional score to > 75% as indicated by FOTO knee survey (Ongoing)       Start:  04/02/25    Expected End:  05/19/25            Patient will be independent in HEP.  (Progressing)       Start:  04/02/25    Expected End:  05/19/25               Short Term Goals       Facilitate improved patellar mobility (Progressing)       Start:  04/02/25    Expected End:  04/24/25            Facilitate improved scar mobility (Progressing)       Start:  04/02/25    Expected End:  04/24/25            Patient will navigate at least 6 steps utilizing reciprocating gait pattern with minimal UE support (Progressing)       Start:  04/02/25    Expected End:  04/24/25            Patient will achieve terminal knee extension on R during gait activities.  (Progressing)       Start:  04/02/25    Expected End:  04/24/25                Brielle Starr, PT

## 2025-04-28 ENCOUNTER — CLINICAL SUPPORT (OUTPATIENT)
Dept: REHABILITATION | Facility: HOSPITAL | Age: 75
End: 2025-04-28
Attending: ORTHOPAEDIC SURGERY
Payer: MEDICARE

## 2025-04-28 DIAGNOSIS — M25.661 DECREASED ROM OF RIGHT KNEE: ICD-10-CM

## 2025-04-28 DIAGNOSIS — Z74.09 IMPAIRED FUNCTIONAL MOBILITY AND ACTIVITY TOLERANCE: Primary | ICD-10-CM

## 2025-04-28 DIAGNOSIS — R26.9 GAIT DIFFICULTY: ICD-10-CM

## 2025-04-28 PROCEDURE — 97110 THERAPEUTIC EXERCISES: CPT | Mod: PN

## 2025-04-28 PROCEDURE — 97530 THERAPEUTIC ACTIVITIES: CPT | Mod: PN

## 2025-04-28 NOTE — PROGRESS NOTES
"  Outpatient Rehab    Physical Therapy Visit    Patient Name: Ciaran Canchola  MRN: [id number]  YOB: 1950  Encounter Date: 4/28/2025    Therapy Diagnosis:   Encounter Diagnoses   Name Primary?    Impaired functional mobility and activity tolerance Yes    Gait difficulty     Decreased ROM of right knee      Physician: Tay Alonso,*    Physician Orders: Eval and Treat  Medical Diagnosis: S/P total knee arthroplasty, right    Visit # / Visits Authorized:  8 / 10  Insurance Authorization Period: 4/2/2025 to 6/10/2025  Date of Evaluation: 4/1/2025  Plan of Care Certification: 4/1/2025 to 5/19/2025     PT/PTA: PT   Number of PTA visits since last PT visit:   Time In: 1232   Time Out: 1340  Total Time: 68   Total Billable Time: 62    FOTO:  Intake Score: 59%  Survey Score 1: 56%  Survey Score 2:  %    Precautions  Right Lower Extremity Weight-Bearing Status: Full weight-bearing  Standard following TKA; Cardiac co-morbidities      Subjective   "It lets me know it's there.".  Pain reported as 1/10. Reports residual tenderness along incision.    Objective       Knee Range of Motion   Right Knee   Active (deg) Passive (deg) Pain   Flexion 110 118     Extension -10 -5         Measurements taken in sitting. Able to achieve 0* extension after prone hang.       Treatment:  Therapeutic Exercise  TE 2: Standing: Gastroc stretches using wedge 3x30s, B soleus stretch using wedge 3x30s, R hamstring curl using 4# x20, R hip march using 4# wt x 20  TE 3: Shuttle leg press x20 ea: Double using 75#, R single using 50#  TE 4: Seated: Hamstring stretch 3x30s ea, R March 4# x 20, LAQ using 4# x20, R TKE w/SLR using 4# x20  TE 5: Supine: R Hip flexor stretches x3x30'', R SLR using 4# x20 (stressing TKE), R SAQ using 4# x20, Propping into TKE w/bolster under ankle using 4# x 3'  TE 6: Sidelying: R hip abduction using 4# x20  TE 7: Prone: R hip extension using 4# x20; Prone hang using 4# x 3'  Manual Therapy  MT 1: " "End range stretching into extension  Therapeutic Activity  TA 1: Treadmill @ 2% incline and 1.5 mph x 6', Lateral @ 0.6 mph x 2' ea, Backward 0.8 mph x 3' (13 minutes total)  TA 2: R closed chain TKE on 8" step x20, Step up & over 8" step using R as power x10 ea way (4# wt on ea), Lateral step up & over 8" step x10 ea way (4# wt on ea)  TA 3: Squat x 20    Time Entry(in minutes):  Manual Therapy Time Entry: 3  Therapeutic Activity Time Entry: 20  Therapeutic Exercise Time Entry: 39    Assessment & Plan   Assessment: Ciaran presents to PT today reporting very minimal knee pain. He states he has decreased sensitivity around his knee as noted by decreased discomfort when knee is brushed against. He did well with increased resistance to exercise.  Initiated treadmill activity which patient performed fairly well but required slow esa. His ROM is improving but he continues with end range restriction in both flexion and extension.  Evaluation/Treatment Tolerance: Patient tolerated treatment well    Patient will continue to benefit from skilled outpatient physical therapy to address the deficits listed in the problem list box on initial evaluation, provide pt/family education and to maximize pt's level of independence in the home and community environment.     Patient's spiritual, cultural, and educational needs considered and patient agreeable to plan of care and goals.     Education  Education was done with Patient. The patient's learning style includes Demonstration and Listening. The patient Demonstrates understanding and Verbalizes understanding.         Reviewed exercises as noted with verbal cues for technique. Instructed patient in heel toe gait pattern during treadmill activity.      Plan: The patient is to be progressed within the established plan of care as tolerated in order to accomplish stated goals. Add prone quad sets to enhance extension and prone knee flexion. Gait training over uneven surfaces as " weather permits.    Goals:   Active       Long Term Goals       Facilitate improved LE flexibility (Progressing)       Start:  04/02/25    Expected End:  05/19/25            Patient will consistently ambulate over level and uneven surfaces demonstrating equal weight shift, adequate foot clearance and appropriate knee flexion (Progressing)       Start:  04/02/25    Expected End:  05/19/25            Patient will consistently navigate steps utilizing reciprocating gait pattern with no more than minimal UE support (Progressing)       Start:  04/02/25    Expected End:  05/19/25            Patient will resume his usual yard work activities (Progressing)       Start:  04/02/25    Expected End:  05/19/25            Patient will consistently perform sit <> stand transfers with equal weight bearing and good pace.  (Met)       Start:  04/02/25    Expected End:  05/19/25    Resolved:  04/28/25            Long Term Goals continued       Improve functional score to > 75% as indicated by FOTO knee survey (Ongoing)       Start:  04/02/25    Expected End:  05/19/25            Patient will be independent in HEP.  (Progressing)       Start:  04/02/25    Expected End:  05/19/25               Short Term Goals       Facilitate improved patellar mobility (Met)       Start:  04/02/25    Expected End:  04/24/25    Resolved:  04/28/25         Facilitate improved scar mobility (Progressing)       Start:  04/02/25    Expected End:  04/24/25            Patient will navigate at least 6 steps utilizing reciprocating gait pattern with minimal UE support (Met)       Start:  04/02/25    Expected End:  04/24/25    Resolved:  04/28/25         Patient will achieve terminal knee extension on R during gait activities.  (Progressing)       Start:  04/02/25    Expected End:  04/24/25                Brielle Starr, PT

## 2025-04-29 ENCOUNTER — EXTERNAL HOME HEALTH (OUTPATIENT)
Dept: HOME HEALTH SERVICES | Facility: HOSPITAL | Age: 75
End: 2025-04-29
Payer: MEDICARE

## 2025-04-30 ENCOUNTER — CLINICAL SUPPORT (OUTPATIENT)
Dept: REHABILITATION | Facility: HOSPITAL | Age: 75
End: 2025-04-30
Attending: ORTHOPAEDIC SURGERY
Payer: MEDICARE

## 2025-04-30 DIAGNOSIS — R26.9 GAIT DIFFICULTY: ICD-10-CM

## 2025-04-30 DIAGNOSIS — M25.661 DECREASED ROM OF RIGHT KNEE: ICD-10-CM

## 2025-04-30 DIAGNOSIS — Z74.09 IMPAIRED FUNCTIONAL MOBILITY AND ACTIVITY TOLERANCE: Primary | ICD-10-CM

## 2025-04-30 PROCEDURE — 97110 THERAPEUTIC EXERCISES: CPT | Mod: PN

## 2025-04-30 PROCEDURE — 97530 THERAPEUTIC ACTIVITIES: CPT | Mod: PN

## 2025-04-30 NOTE — PROGRESS NOTES
"  Outpatient Rehab    Physical Therapy Progress Note : Updated Plan of Care    Patient Name: Ciaran Canchola  MRN: [id number]  YOB: 1950  Encounter Date: 4/30/2025    Therapy Diagnosis:   Encounter Diagnoses   Name Primary?    Impaired functional mobility and activity tolerance Yes    Gait difficulty     Decreased ROM of right knee      Physician: Tay Alonso,*    Physician Orders: Eval and Treat  Medical Diagnosis: S/P total knee arthroplasty, right    Visit # / Visits Authorized:  9 / 20  Insurance Authorization Period: 4/2/2025 to 7/10/2025  Date of Evaluation: 4/1/2025  Plan of Care Certification: 4/1/2025 to 5/19/2025 Updated to 4/30/2025 to 5/30/2025     PT/PTA: PT   Number of PTA visits since last PT visit:   Time In: 0858   Time Out: 1015  Total Time: 77   Total Billable Time: 60    FOTO:  Intake Score: 59%  Survey Score 1: 56%  Survey Score 2:  %    Precautions  Right Lower Extremity Weight-Bearing Status: Full weight-bearing  Standard following TKA; Cardiac co-morbidities      Subjective   "I spent most of the day doing yard work.".  Pain reported as 1/10. "It didn't get too bad but after a shower and medication it was good"    Objective       Knee Swelling  Location of Measurement Right  (cm) Left  (cm)   20 cm Above Joint Line 55.5     10 cm Vastus Medialis Oblique 49     At Joint Line 39.6     15 cm Below Joint Line 40               Knee Range of Motion   Right Knee   Active (deg) Passive (deg) Pain   Flexion 117 125     Extension -10 -5                        Knee Strength   Right Strength Right Pain Left Strength Left  Pain   Flexion (S2) 4+         Prone Flexion           Extension (L3) 4+                  Treatment:  Therapeutic Exercise  TE 2: Standing: Gastroc stretches using wedge 3x30s, B soleus stretch using wedge 3x30s, R hamstring curl using 4# x20, R hip march using 4# wt x 20  TE 3: Shuttle leg press x20 ea: Double using 75#, R single using 50#  TE 4: Seated: " "Hamstring stretch 3x30s ea, R March 4# x 20, LAQ using 4# x20, R TKE w/SLR using 4# x20  TE 5: Supine: R Hip flexor stretches x3x30'', R SLR using 4# x20 (stressing TKE), R SAQ using 4# x20, Heel slides with 4# wt and overpressure using strap x 20  TE 6: Sidelying: R hip abduction using 4# x20  TE 7: Prone: R hip extension using 4# x20; Quad sets x 20; Knee flexion x 20; Prone hang using 4# x 2'  Therapeutic Activity  TA 1: Treadmill @ 3% incline and 1.8 mph x 6', Lateral @ 0.6 mph x 2' ea, Backward 0.8 mph x 3' (13 minutes total)  TA 2: R closed chain TKE on 8" step x20, Step up & over 8" step using R as power x10 ea way (4# wt on ea), Lateral step up & over 8" step x10 ea way (4# wt on ea)  TA 3: Squat x 20    Time Entry(in minutes):  Therapeutic Activity Time Entry: 20  Therapeutic Exercise Time Entry: 48    Assessment & Plan   Assessment  Ciaran presents with a condition of Low complexity.   Presentation of Symptoms: Changing  Will Comorbidities Impact Care: No       Functional Limitations: Activity tolerance, Ambulating on uneven surfaces, Functional mobility, Getting off the floor, Range of motion, Squatting  Impairments: Abnormal or restricted range of motion    Patient Goal for Therapy (PT): To walk better.  Prognosis: Good  Prognosis Details: Anticipated barriers to treatment: none  Assessment Details: Ciaran is making steady progress in PT with decreased R knee swelling, improved R knee ROM, nearly normal strength.  However, he continues to exhibit extensor lag with restricted passive extension and discomfort with end range flexion.  He struggles somewhat with step negotiation and gait over uneven surfaces.  He will benefit from continued skilled PT services to enhance ROM/knee stability, to resolve gait deficits, and to maximize activity tolerance,     Plan  From a physical therapy perspective, the patient would benefit from: Skilled Rehab Services    Planned therapy interventions include: Therapeutic " exercise, Therapeutic activities, Neuromuscular re-education, Manual therapy, Gait training, and Other (Comment). Therapeutic taping  Planned modalities to include: Electrical stimulation - passive/unattended, Cryotherapy (cold pack), Thermotherapy (hot pack), and Other (Comment). NMES      Visit Frequency: 2 times Per Week for 4 Weeks.       This plan was discussed with Patient.   Discussion participants: Agreed Upon Plan of Care  Plan details: Ciaran may be treated by PTA as part of their rehab team.       Patient will continue to benefit from skilled outpatient physical therapy to address the deficits listed in the problem list box on initial evaluation, provide pt/family education and to maximize pt's level of independence in the home and community environment.     Patient's spiritual, cultural, and educational needs considered and patient agreeable to plan of care and goals.     Education  Education was done with Patient. The patient's learning style includes Demonstration and Listening. The patient Demonstrates understanding and Verbalizes understanding.         Instructed patient in prone quad sets, prone knee flexion     Goals:   Active       Long Term Goals       Facilitate improved LE flexibility (Progressing)       Start:  04/02/25    Expected End:  05/30/25            Patient will consistently ambulate over level and uneven surfaces demonstrating equal weight shift, adequate foot clearance and appropriate knee flexion (Progressing)       Start:  04/02/25    Expected End:  05/30/25            Patient will consistently navigate steps utilizing reciprocating gait pattern with no more than minimal UE support (Progressing)       Start:  04/02/25    Expected End:  05/30/25            Patient will resume his usual yard work activities (Progressing)       Start:  04/02/25    Expected End:  05/30/25            Patient will consistently perform sit <> stand transfers with equal weight bearing and good pace.  (Met)        Start:  04/02/25    Expected End:  05/19/25    Resolved:  04/28/25            Long Term Goals continued       Improve functional score to > 75% as indicated by FOTO knee survey (Ongoing)       Start:  04/02/25    Expected End:  05/30/25            Patient will be independent in HEP.  (Progressing)       Start:  04/02/25    Expected End:  05/30/25               Long Term Goals updated       Patient will safely perform floor <> stand transfers       Start:  04/30/25    Expected End:  05/30/25               Short Term Goals       Facilitate improved patellar mobility (Met)       Start:  04/02/25    Expected End:  04/24/25    Resolved:  04/28/25         Facilitate improved scar mobility (Progressing)       Start:  04/02/25    Expected End:  05/15/25            Patient will navigate at least 6 steps utilizing reciprocating gait pattern with minimal UE support (Met)       Start:  04/02/25    Expected End:  04/24/25    Resolved:  04/28/25         Patient will achieve terminal knee extension on R during gait activities.  (Progressing)       Start:  04/02/25    Expected End:  05/15/25               Short Term Goals Updated       Resolve passive extension deficit       Start:  04/30/25    Expected End:  05/15/25            Patient will demonstrate proper mechanics for shin to waist lift        Start:  04/30/25    Expected End:  05/15/25                Brielle Starr PT

## 2025-05-05 ENCOUNTER — CLINICAL SUPPORT (OUTPATIENT)
Dept: REHABILITATION | Facility: HOSPITAL | Age: 75
End: 2025-05-05
Attending: ORTHOPAEDIC SURGERY
Payer: MEDICARE

## 2025-05-05 DIAGNOSIS — R26.9 GAIT DIFFICULTY: ICD-10-CM

## 2025-05-05 DIAGNOSIS — Z74.09 IMPAIRED FUNCTIONAL MOBILITY AND ACTIVITY TOLERANCE: Primary | ICD-10-CM

## 2025-05-05 DIAGNOSIS — M25.661 DECREASED ROM OF RIGHT KNEE: ICD-10-CM

## 2025-05-05 PROCEDURE — 97110 THERAPEUTIC EXERCISES: CPT | Mod: PN,CQ

## 2025-05-05 PROCEDURE — 97530 THERAPEUTIC ACTIVITIES: CPT | Mod: PN,CQ

## 2025-05-05 NOTE — PROGRESS NOTES
"  Outpatient Rehab    Physical Therapy Visit    Patient Name: Ciaran Canchola  MRN: [id number]  YOB: 1950  Encounter Date: 5/5/2025    Therapy Diagnosis:   Encounter Diagnoses   Name Primary?    Impaired functional mobility and activity tolerance Yes    Gait difficulty     Decreased ROM of right knee      Physician: Tay Alonso,*    Physician Orders: Eval and Treat  Medical Diagnosis: S/P total knee arthroplasty, right    Visit # / Visits Authorized: 10 / 20 (11 total)  Insurance Authorization Period: 4/2/2025 to 7/10/2025  Date of Evaluation: 4/1/2025   Plan of Care Certification: 4/1/2025 to 5/19/2025 Updated to 4/30/2025 to 5/30/2025     PT/PTA: PTA   Number of PTA visits since last PT visit: 1  Time In: 0955   Time Out: 1104  Total Time (in minutes): 69   Total Billable Time (in minutes): 66    FOTO:  Intake Score: 59%  Survey Score 2: 56%  Survey Score 3:  %    Precautions     Standard following TKA; Cardiac co-morbidities      Subjective   "It lets me know sometimes it's there; I probably over excert it sometimes. I think I am doing well." Post previous: "I went home and iced it. I was alright.".  Pain reported as 1/10. 1-2/10 lateral R knee "It just feels stiff."    Objective            Treatment:  Therapeutic Exercise  TE 2: Standing: Gastroc stretches using wedge 3x30s, B soleus stretch using wedge 3x30s, R hamstring curl using 4# x20  TE 3: Shuttle leg press x20 ea: Double using 75#, R single using 50#  TE 4: Seated: Hamstring stretch 3x30s ea, *Reviewed preporation for freida sitting*, LAQ using 4# x20, R TKE w/SLR using 4# x20  TE 5: Supine: R Hip flexor stretches x3x30'', R SLR using 4# x20 (stressing TKE), R SAQ using 4# x20  TE 6: Sidelying: R hip abduction using 4# x20  TE 7: Prone: R hip extension using 4# x20, Quad sets x20, Prone hang using 4# x2'  Therapeutic Activity  TA 1: Treadmill 3% incline @1.8mph x6' & retro 1mph x3' (x9' total)  TA 2: R closed chain TKE on 10" " "step x20, Step up & over 10" step using R as power x10 ea way (4# on R), Lateral step up & over 8" step x10 ea way (4# on R)  TA 3: Squat x20    Time Entry(in minutes):  Therapeutic Activity Time Entry: 17  Therapeutic Exercise Time Entry: 49    Assessment & Plan   Assessment: The patient reported to physical therapy stating slight lateral knee discomfort, reporting stiffness. Ciaran Canchola was progressed with increased functional stability and performance training. Patient was able to demonstrate improved step navigation on this date, even with increased height. Increased knee extension noted during short arc quad (lacking 3 degrees on this date) compared to long arc quad.  Evaluation/Treatment Tolerance: Patient tolerated treatment well    Patient will continue to benefit from skilled outpatient physical therapy to address the deficits listed in the problem list box on initial evaluation, provide pt/family education and to maximize pt's level of independence in the home and community environment.     Patient's spiritual, cultural, and educational needs considered and patient agreeable to plan of care and goals.     Education  Education was done with Patient. The patient's learning style includes Demonstration, Listening, and Tactile. The patient Demonstrates understanding and Verbalizes understanding.         The patient was progressed slightly with cues provided for technique.       Plan: POC: 2 times Per Week for 4 Weeks. Therapeutic exercise, Therapeutic activities, Neuromuscular re-education, Manual therapy, Gait training, Therapeutic taping, Electrical stimulation - passive/unattended, NMES, Cryotherapy (cold pack), and Thermotherapy (hot pack). Ciaran may be treated by PTA as part of their rehab team.  The patient is to be progressed within the established plan of care as tolerated in order to accomplish stated goals. Plan to progress gait training over uneven surfaces as weather permits; review body " mechanics training and prepare for floor to stand transfers with review (partial gregg leggett in weight bearing) subsequently.    Goals:   Active       Long Term Goals       Facilitate improved LE flexibility (Progressing)       Start:  04/02/25    Expected End:  05/30/25            Patient will consistently ambulate over level and uneven surfaces demonstrating equal weight shift, adequate foot clearance and appropriate knee flexion (Progressing)       Start:  04/02/25    Expected End:  05/30/25            Patient will consistently navigate steps utilizing reciprocating gait pattern with no more than minimal UE support (Met)       Start:  04/02/25    Expected End:  05/30/25    Resolved:  05/05/25         Patient will resume his usual yard work activities (Met)       Start:  04/02/25    Expected End:  05/30/25    Resolved:  05/05/25         Patient will consistently perform sit <> stand transfers with equal weight bearing and good pace.  (Met)       Start:  04/02/25    Expected End:  05/19/25    Resolved:  04/28/25            Long Term Goals continued       Improve functional score to > 75% as indicated by FOTO knee survey (Ongoing)       Start:  04/02/25    Expected End:  05/30/25            Patient will be independent in HEP.  (Progressing)       Start:  04/02/25    Expected End:  05/30/25               Long Term Goals updated       Patient will safely perform floor <> stand transfers (Ongoing)       Start:  04/30/25    Expected End:  05/30/25               Short Term Goals       Facilitate improved patellar mobility (Met)       Start:  04/02/25    Expected End:  04/24/25    Resolved:  04/28/25         Facilitate improved scar mobility (Progressing)       Start:  04/02/25    Expected End:  05/15/25            Patient will navigate at least 6 steps utilizing reciprocating gait pattern with minimal UE support (Met)       Start:  04/02/25    Expected End:  04/24/25    Resolved:  04/28/25         Patient will  achieve terminal knee extension on R during gait activities.  (Progressing)       Start:  04/02/25    Expected End:  05/15/25               Short Term Goals Updated       Resolve passive extension deficit (Progressing)       Start:  04/30/25    Expected End:  05/15/25            Patient will demonstrate proper mechanics for shin to waist lift  (Ongoing)       Start:  04/30/25    Expected End:  05/15/25                Polly Marin, PTA

## 2025-05-07 ENCOUNTER — CLINICAL SUPPORT (OUTPATIENT)
Dept: REHABILITATION | Facility: HOSPITAL | Age: 75
End: 2025-05-07
Attending: ORTHOPAEDIC SURGERY
Payer: MEDICARE

## 2025-05-07 DIAGNOSIS — M25.661 DECREASED ROM OF RIGHT KNEE: ICD-10-CM

## 2025-05-07 DIAGNOSIS — Z96.651 S/P TOTAL KNEE ARTHROPLASTY, RIGHT: Primary | ICD-10-CM

## 2025-05-07 DIAGNOSIS — R26.9 GAIT DIFFICULTY: ICD-10-CM

## 2025-05-07 DIAGNOSIS — Z74.09 IMPAIRED FUNCTIONAL MOBILITY AND ACTIVITY TOLERANCE: Primary | ICD-10-CM

## 2025-05-07 PROCEDURE — 97110 THERAPEUTIC EXERCISES: CPT | Mod: PN,CQ

## 2025-05-07 PROCEDURE — 97530 THERAPEUTIC ACTIVITIES: CPT | Mod: PN,CQ

## 2025-05-07 NOTE — PROGRESS NOTES
"  Outpatient Rehab    Physical Therapy Visit    Patient Name: Ciaran Canchola  MRN: [id number]  YOB: 1950  Encounter Date: 2025    Therapy Diagnosis:   Encounter Diagnoses   Name Primary?    Impaired functional mobility and activity tolerance Yes    Gait difficulty     Decreased ROM of right knee      Physician: Tay Alonso,*    Physician Orders: Eval and Treat  Medical Diagnosis: S/P total knee arthroplasty, right    Visit # / Visits Authorized:  (12 total)  Insurance Authorization Period: 2025 to 7/10/2025  Date of Evaluation: 2025   Plan of Care Certification: 2025 to 2025 Updated to 2025 to 2025     PT/PTA: PTA   Number of PTA visits since last PT visit: 2  Time In: 1006   Time Out: 1110  Total Time (in minutes): 64   Total Billable Time (in minutes): 62    FOTO:  Intake Score: 59%  Survey Score 2: 56%  Survey Score 3: 74%    Precautions     Standard following TKA; Cardiac co-morbidities      Subjective   "It's okay. It's been about 2 months, and I am not exactly where I expected. It's about the usual, just get it over." Post previous: Nonremarkable.  Pain reported as /10. R knee "not really pain, just stiffness"    Objective       Knee Range of Motion   Right Knee   Active (deg) Passive (deg) Pain   Flexion 112       Extension -4           R knee AAROM in sittin-0-117*           Treatment:  Therapeutic Exercise  TE 2: Standing: Gastroc stretches using wedge 3x30s, B soleus stretch using wedge 3x30s, R hamstring curl using 5# x20  TE 3: Shuttle leg press x20 ea: Double using 87#, R single using 50#  TE 4: Seated: Hamstring stretch 3x30s ea, LAQ using 5# x20, R TKE w/SLR using 5# x20  TE 5: Supine: R Hip flexor stretches 3x30s, R SLR using 5# x20 (stressing TKE), R SAQ using 5# x20, R heel slide AAROM 20x3s  TE 6: Sidelying: R hip abduction using 5# x20  TE 7: Prone: R hip extension using 5# x20, Quad sets x20, Prone hang using 5# " "x3'  Therapeutic Activity  TA 1: Treadmill 3% incline @1.8mph x8' & retro 1mph x3' (x11' total)  TA 2: R closed chain TKE on 10" step x20, Step up & over 10" step using R as power x10 ea way (5# on R), Lateral step up & over 10" step x10 ea way (5# on R)  TA 3: Squat x20    Time Entry(in minutes):  Therapeutic Activity Time Entry: 20  Therapeutic Exercise Time Entry: 42    Assessment & Plan   Assessment: The patient reported to physical therapy without present complaint; continued moderate stiffness in the morning. Ciaran Jesika was progressed with increased resistance and range of motion training to aide in functional performance, strength, stability, mobility, and endurance in order to return to full activity and work duties. Patient is gradually improving terminal knee extension. Quad weakness noted during arc quads and leg raises, affecting terminal knee extension.  Evaluation/Treatment Tolerance: Patient tolerated treatment well    Patient will continue to benefit from skilled outpatient physical therapy to address the deficits listed in the problem list box on initial evaluation, provide pt/family education and to maximize pt's level of independence in the home and community environment.     Patient's spiritual, cultural, and educational needs considered and patient agreeable to plan of care and goals.     Education  Education was done with Patient. The patient's learning style includes Demonstration, Listening, and Tactile. The patient Demonstrates understanding and Verbalizes understanding.         The patient was progressed slightly with cues provided for technique.       Plan: POC: 2 times Per Week for 4 Weeks. Therapeutic exercise, Therapeutic activities, Neuromuscular re-education, Manual therapy, Gait training, Therapeutic taping, Electrical stimulation - passive/unattended, NMES, Cryotherapy (cold pack), and Thermotherapy (hot pack). Ciaran may be treated by PTA as part of their rehab team.  The " patient is to be progressed within the established plan of care as tolerated in order to accomplish stated goals. Plan to progress gait training over uneven surfaces as weather permits; review body mechanics training and prepare for floor to stand transfers with review (partial lunges, desensitazion in weight bearing) subsequently. Utilize NMES as cleared to aide in acheiving terminal knee extension.    Goals:   Active       Long Term Goals       Facilitate improved LE flexibility (Progressing)       Start:  04/02/25    Expected End:  05/30/25            Patient will consistently ambulate over level and uneven surfaces demonstrating equal weight shift, adequate foot clearance and appropriate knee flexion (Progressing)       Start:  04/02/25    Expected End:  05/30/25            Patient will consistently navigate steps utilizing reciprocating gait pattern with no more than minimal UE support (Met)       Start:  04/02/25    Expected End:  05/30/25    Resolved:  05/05/25         Patient will resume his usual yard work activities (Met)       Start:  04/02/25    Expected End:  05/30/25    Resolved:  05/05/25         Patient will consistently perform sit <> stand transfers with equal weight bearing and good pace.  (Met)       Start:  04/02/25    Expected End:  05/19/25    Resolved:  04/28/25            Long Term Goals continued       Improve functional score to > 75% as indicated by FOTO knee survey (Progressing)       Start:  04/02/25    Expected End:  05/30/25            Patient will be independent in HEP.  (Progressing)       Start:  04/02/25    Expected End:  05/30/25               Long Term Goals updated       Patient will safely perform floor <> stand transfers (Ongoing)       Start:  04/30/25    Expected End:  05/30/25               Short Term Goals       Facilitate improved patellar mobility (Met)       Start:  04/02/25    Expected End:  04/24/25    Resolved:  04/28/25         Facilitate improved scar mobility  (Progressing)       Start:  04/02/25    Expected End:  05/15/25            Patient will navigate at least 6 steps utilizing reciprocating gait pattern with minimal UE support (Met)       Start:  04/02/25    Expected End:  04/24/25    Resolved:  04/28/25         Patient will achieve terminal knee extension on R during gait activities.  (Progressing)       Start:  04/02/25    Expected End:  05/15/25               Short Term Goals Updated       Resolve passive extension deficit (Progressing)       Start:  04/30/25    Expected End:  05/15/25            Patient will demonstrate proper mechanics for shin to waist lift  (Ongoing)       Start:  04/30/25    Expected End:  05/15/25                Polly Marin, PTA

## 2025-05-09 ENCOUNTER — OFFICE VISIT (OUTPATIENT)
Dept: ORTHOPEDICS | Facility: CLINIC | Age: 75
End: 2025-05-09
Payer: MEDICARE

## 2025-05-09 ENCOUNTER — HOSPITAL ENCOUNTER (OUTPATIENT)
Dept: RADIOLOGY | Facility: HOSPITAL | Age: 75
Discharge: HOME | End: 2025-05-09
Attending: ORTHOPAEDIC SURGERY
Payer: MEDICARE

## 2025-05-09 VITALS — HEIGHT: 69 IN | WEIGHT: 257.25 LBS | BODY MASS INDEX: 38.1 KG/M2

## 2025-05-09 DIAGNOSIS — Z96.651 S/P TOTAL KNEE ARTHROPLASTY, RIGHT: ICD-10-CM

## 2025-05-09 DIAGNOSIS — Z96.651 S/P TOTAL KNEE ARTHROPLASTY, RIGHT: Primary | ICD-10-CM

## 2025-05-09 PROCEDURE — 73562 X-RAY EXAM OF KNEE 3: CPT | Mod: 26,59,LT, | Performed by: RADIOLOGY

## 2025-05-09 PROCEDURE — 73564 X-RAY EXAM KNEE 4 OR MORE: CPT | Mod: 26,RT,, | Performed by: RADIOLOGY

## 2025-05-09 PROCEDURE — 73562 X-RAY EXAM OF KNEE 3: CPT | Mod: TC,PO,LT

## 2025-05-09 PROCEDURE — 99999 PR PBB SHADOW E&M-EST. PATIENT-LVL III: CPT | Mod: PBBFAC,,, | Performed by: ORTHOPAEDIC SURGERY

## 2025-05-09 NOTE — PROGRESS NOTES
Subjective:      Patient ID: Ciaran Canchola is a 74 y.o. male.    Chief Complaint: Pain and Post-op Evaluation of the Right Knee (DOS 3/12/2025)    HPI  Patient follow up status post right total knee.  He is doing well status post total knee replacement.  He states that he has several outpatient therapy sessions left.  ROS      Objective:    Ortho Exam     Patient presents with a very minimally antalgic but comfortable appearing gait  The patient has excellent range of motion still has a persistent small effusion  Well-healed surgical incision    X-ray Knee Ortho Right with Flexion (XPD)  Narrative: EXAMINATION:  XR KNEE ORTHO RIGHT WITH FLEXION (XPD)    CLINICAL HISTORY:  Presence of right artificial knee joint    TECHNIQUE:  AP standing as well as PA flexion standing and Merchant views of both knees were performed.  A lateral view of the right knee is also performed.    COMPARISON:  Right knee x-ray of March 12, 2025    FINDINGS:  The patient has undergone a right total knee arthroplasty with metallic femoral and tibial components in good position.  Lucency around the prosthesis consistent with osteomyelitis or loosening is not seen.  A fracture or joint effusion is not seen.  Impression: Status post right TKA.  No acute findings.    Electronically signed by: Tc Nava MD  Date:    05/09/2025  Time:    08:49       My Radiographs Findings:    Radiographs shows nice alignment status post total knee replacement  Assessment:       No diagnosis found.      Plan:       Patient is doing well status post total knee replacement.  Continue with the rehab per protocol advancing activities within reason as tolerated.  Follow up in 6 weeks sooner if any questions or problems        Past Medical History:   Diagnosis Date    Depression     Diabetes mellitus     Hyperlipidemia     Hypertension     Hypertensive retinopathy of both eyes 11/20/2023     Past Surgical History:   Procedure Laterality Date    CATARACT  EXTRACTION W/  INTRAOCULAR LENS IMPLANT Left 2024    Procedure: CEIOL OS;  Surgeon: Donato Godoy MD;  Location: [facility] OR;  Service: Ophthalmology;  Laterality: Left;    CATARACT EXTRACTION W/  INTRAOCULAR LENS IMPLANT Right 3/13/2024    Procedure: CEIOL OD;  Surgeon: Donato Godoy MD;  Location: [facility] OR;  Service: Ophthalmology;  Laterality: Right;    CHOLECYSTECTOMY      COLONOSCOPY  2018    RTC in 5 years. done in [place]    KNEE ARTHROPLASTY Right 3/12/2025    Procedure: ARTHROPLASTY, KNEE;  Surgeon: Tay Alonso MD;  Location: [facility] OR;  Service: Orthopedics;  Laterality: Right;    TONSILLECTOMY      UMBILICAL HERNIA REPAIR         Current Medications[1]    Review of patient's allergies indicates:  No Known Allergies    Family History   Problem Relation Name Age of Onset    ALS Mother      Heart disease Father      Heart disease Sister       Social History     Occupational History    Not on file   Tobacco Use    Smoking status: Former     Current packs/day: 0.00     Types: Cigarettes     Quit date:      Years since quittin.3    Smokeless tobacco: Never    Tobacco comments:     Quit  35 years ago   Substance and Sexual Activity    Alcohol use: Yes     Comment: SOCIALLY    Drug use: Never    Sexual activity: Yes     Partners: Female            [1]   Current Outpatient Medications:     alcohol swabs (ALCOHOL PREP) PadM, Apply 2 each topically once daily., Disp: 200 each, Rfl: 3    blood sugar diagnostic (ACCU-CHEK GUIDE TEST STRIPS) Strp, Inject 2 each into the skin 2 (two) times a day., Disp: 200 strip, Rfl: 3    blood-glucose meter (ACCU-CHEK GUIDE ME GLUCOSE MTR) Misc, Inject 1 each into the skin once daily., Disp: 1 each, Rfl: 0    celecoxib (CELEBREX) 200 MG capsule, Take 1 capsule (200 mg total) by mouth once daily., Disp: 30 capsule, Rfl: 1    empagliflozin (JARDIANCE) 10 mg tablet, TAKE 1 TABLET ONE TIME DAILY, Disp: 90 tablet, Rfl: 0    esomeprazole (NEXIUM) 40 MG  capsule, TAKE 1 CAPSULE (40 MG TOTAL) BY MOUTH BEFORE BREAKFAST., Disp: 90 capsule, Rfl: 2    HYDROcodone-acetaminophen (NORCO) 5-325 mg per tablet, Take 1 tablet by mouth every 6 (six) hours as needed for Pain., Disp: 30 tablet, Rfl: 0    lancets (ACCU-CHEK SOFTCLIX LANCETS) Misc, TEST BLOOD SUGAR TWO TIMES DAILY, Disp: 200 each, Rfl: 3    lisinopriL-hydrochlorothiazide (PRINZIDE,ZESTORETIC) 20-25 mg Tab, Take 1 tablet by mouth once daily., Disp: 90 tablet, Rfl: 0    meloxicam (MOBIC) 15 MG tablet, TAKE 1 TABLET(15 MG) BY MOUTH DAILY, Disp: 90 tablet, Rfl: 0    rosuvastatin (CRESTOR) 5 MG tablet, TAKE 1 TABLET EVERY EVENING, Disp: 90 tablet, Rfl: 2    sertraline (ZOLOFT) 50 MG tablet, , Disp: 180 tablet, Rfl: 0

## 2025-05-12 ENCOUNTER — CLINICAL SUPPORT (OUTPATIENT)
Dept: REHABILITATION | Facility: HOSPITAL | Age: 75
End: 2025-05-12
Attending: ORTHOPAEDIC SURGERY
Payer: MEDICARE

## 2025-05-12 DIAGNOSIS — Z74.09 IMPAIRED FUNCTIONAL MOBILITY AND ACTIVITY TOLERANCE: Primary | ICD-10-CM

## 2025-05-12 DIAGNOSIS — M25.661 DECREASED ROM OF RIGHT KNEE: ICD-10-CM

## 2025-05-12 DIAGNOSIS — R26.9 GAIT DIFFICULTY: ICD-10-CM

## 2025-05-12 PROCEDURE — 97110 THERAPEUTIC EXERCISES: CPT | Mod: PN,CQ

## 2025-05-12 PROCEDURE — 97530 THERAPEUTIC ACTIVITIES: CPT | Mod: PN,CQ

## 2025-05-12 NOTE — PROGRESS NOTES
"  Outpatient Rehab    Physical Therapy Visit    Patient Name: Ciaran Canchola  MRN: [id number]  YOB: 1950  Encounter Date: 5/12/2025    Therapy Diagnosis:   Encounter Diagnoses   Name Primary?    Impaired functional mobility and activity tolerance Yes    Gait difficulty     Decreased ROM of right knee      Physician: Tay Alonso,*    Physician Orders: Eval and Treat  Medical Diagnosis: S/P total knee arthroplasty, right    Visit # / Visits Authorized: 12 / 20 (13 total)  Insurance Authorization Period: 4/2/2025 to 7/10/2025  Date of Evaluation: 4/1/2025   Plan of Care Certification: 4/1/2025 to 5/19/2025 Updated to 4/30/2025 to 5/30/2025     PT/PTA: PTA   Number of PTA visits since last PT visit: 3  Time In: 1009   Time Out: 1116  Total Time (in minutes): 67   Total Billable Time (in minutes): 42 due to being doubled    FOTO:  Intake Score: 59%  Survey Score 2: 56%  Survey Score 3: 74%       Standard following TKA; Cardiac co-morbidities      Subjective   "It was doing good until I overextended trying to bring my foot up to put my sock on." Post previous: reported fatigue.  Pain reported as 1/10. R knee    Objective       Knee Range of Motion    Sitting R knee AAROM 0-130*           Treatment:  Therapeutic Exercise  TE 2: Standing: Gastroc stretches using wedge 3x30s, B soleus stretch using wedge 3x30s, R hamstring curl using 5# x20  TE 3: Shuttle leg press x20 ea: Double using 87#, R single using 50#  TE 4: Seated: Hamstring stretch 3x30s ea, LAQ using 5# x20, R TKE w/SLR using 5# x20, R hip external rotation in conjunction with knee flexion using 5# x20, R heel slide AAROM 20x3s  TE 5: Supine: R Hip flexor stretches 3x30s, R SLR using 5# x20, R SAQ using 5# x20  TE 6: Sidelying: R hip abduction using 5# x20  TE 7: Prone: R hip extension using 5# x20, Quad sets x20  Therapeutic Activity  TA 1: Treadmill 4% incline @1.8mph x6' & retro @1.2mph x4' (x10' total)  TA 2: R closed chain TKE on " "10" step x20, Step up & over 10" step using R as power x10 ea way (5# on R), Lateral step up & over 10" step x10 ea way (5# on R)  TA 3: Squat x20  TA 4: Partial lunges at bar 5x10ft ea way  TA 5: Lateral lunges at bar 5x10 ft ea way    Time Entry(in minutes):  Therapeutic Activity Time Entry: 23  Therapeutic Exercise Time Entry: 44    Assessment & Plan   Assessment: The patient reported to physical therapy stating increased discomfort following attempting to cross lower extremities to marya socks. Ciaran Canchola was progressed with increased functional lower extremity strength and stability training. Range of motion improved. Patient is returning to work next week, and would benefit from transfer training and body mechanics training.  Evaluation/Treatment Tolerance: Patient tolerated treatment well    Patient will continue to benefit from skilled outpatient physical therapy to address the deficits listed in the problem list box on initial evaluation, provide pt/family education and to maximize pt's level of independence in the home and community environment.     Patient's spiritual, cultural, and educational needs considered and patient agreeable to plan of care and goals.     Education  Education was done with Patient. The patient's learning style includes Demonstration and Listening. The patient Demonstrates understanding and Verbalizes understanding.         The patient was instructed in progressions as noted. Reviewed personal progress.       Plan: POC: 2 times Per Week for 4 Weeks. Therapeutic exercise, Therapeutic activities, Neuromuscular re-education, Manual therapy, Gait training, Therapeutic taping, Electrical stimulation - passive/unattended, NMES, Cryotherapy (cold pack), and Thermotherapy (hot pack). Ciaran may be treated by PTA as part of their rehab team.  The patient is to be progressed within the established plan of care as tolerated in order to accomplish stated goals. Plan to progress gait training " over uneven surfaces and stair navigation as weather permits; review body mechanics and transfer training in preporation to return to work.    Goals:   Active       Long Term Goals       Facilitate improved LE flexibility (Progressing)       Start:  04/02/25    Expected End:  05/30/25            Patient will consistently ambulate over level and uneven surfaces demonstrating equal weight shift, adequate foot clearance and appropriate knee flexion (Progressing)       Start:  04/02/25    Expected End:  05/30/25            Patient will consistently navigate steps utilizing reciprocating gait pattern with no more than minimal UE support (Met)       Start:  04/02/25    Expected End:  05/30/25    Resolved:  05/05/25         Patient will resume his usual yard work activities (Met)       Start:  04/02/25    Expected End:  05/30/25    Resolved:  05/05/25         Patient will consistently perform sit <> stand transfers with equal weight bearing and good pace.  (Met)       Start:  04/02/25    Expected End:  05/19/25    Resolved:  04/28/25            Long Term Goals continued       Improve functional score to > 75% as indicated by FOTO knee survey (Progressing)       Start:  04/02/25    Expected End:  05/30/25            Patient will be independent in HEP.  (Progressing)       Start:  04/02/25    Expected End:  05/30/25               Long Term Goals updated       Patient will safely perform floor <> stand transfers (Ongoing)       Start:  04/30/25    Expected End:  05/30/25               Short Term Goals       Facilitate improved patellar mobility (Met)       Start:  04/02/25    Expected End:  04/24/25    Resolved:  04/28/25         Facilitate improved scar mobility (Progressing)       Start:  04/02/25    Expected End:  05/15/25            Patient will navigate at least 6 steps utilizing reciprocating gait pattern with minimal UE support (Met)       Start:  04/02/25    Expected End:  04/24/25    Resolved:  04/28/25          Patient will achieve terminal knee extension on R during gait activities.  (Progressing)       Start:  04/02/25    Expected End:  05/15/25               Short Term Goals Updated       Resolve passive extension deficit (Progressing)       Start:  04/30/25    Expected End:  05/15/25            Patient will demonstrate proper mechanics for shin to waist lift  (Ongoing)       Start:  04/30/25    Expected End:  05/15/25                Polly Marin, PTA

## 2025-05-14 DIAGNOSIS — F33.42 RECURRENT MAJOR DEPRESSIVE DISORDER, IN FULL REMISSION: ICD-10-CM

## 2025-05-14 DIAGNOSIS — M19.90 ARTHRITIS: ICD-10-CM

## 2025-05-15 ENCOUNTER — CLINICAL SUPPORT (OUTPATIENT)
Dept: REHABILITATION | Facility: HOSPITAL | Age: 75
End: 2025-05-15
Attending: ORTHOPAEDIC SURGERY
Payer: MEDICARE

## 2025-05-15 DIAGNOSIS — Z74.09 IMPAIRED FUNCTIONAL MOBILITY AND ACTIVITY TOLERANCE: Primary | ICD-10-CM

## 2025-05-15 DIAGNOSIS — R26.9 GAIT DIFFICULTY: ICD-10-CM

## 2025-05-15 DIAGNOSIS — M25.661 DECREASED ROM OF RIGHT KNEE: ICD-10-CM

## 2025-05-15 PROCEDURE — 97110 THERAPEUTIC EXERCISES: CPT | Mod: PN,CQ

## 2025-05-15 PROCEDURE — 97530 THERAPEUTIC ACTIVITIES: CPT | Mod: PN,CQ

## 2025-05-15 RX ORDER — MELOXICAM 15 MG/1
15 TABLET ORAL
Qty: 90 TABLET | Refills: 0 | Status: SHIPPED | OUTPATIENT
Start: 2025-05-15

## 2025-05-15 RX ORDER — SERTRALINE HYDROCHLORIDE 50 MG/1
TABLET, FILM COATED ORAL
Qty: 180 TABLET | Refills: 1 | Status: SHIPPED | OUTPATIENT
Start: 2025-05-15

## 2025-05-15 NOTE — PROGRESS NOTES
"  Outpatient Rehab    Physical Therapy Visit    Patient Name: Ciaran Canchola  MRN: [id number]  YOB: 1950  Encounter Date: 5/15/2025    Therapy Diagnosis:   Encounter Diagnoses   Name Primary?    Impaired functional mobility and activity tolerance Yes    Gait difficulty     Decreased ROM of right knee      Physician: Tay Alonso,*    Physician Orders: Eval and Treat  Medical Diagnosis: S/P total knee arthroplasty, right    Visit # / Visits Authorized: 13 / 20 (14 total)  Insurance Authorization Period: 4/2/2025 to 7/10/2025  Date of Evaluation: 4/1/2025   Plan of Care Certification: 4/1/2025 to 5/19/2025 Updated to 4/30/2025 to 5/30/2025     PT/PTA: PTA   Number of PTA visits since last PT visit: 4  Time In: 1106   Time Out: 1151  Total Time (in minutes): 45   Total Billable Time (in minutes): 42    FOTO:  Intake Score: 59%  Survey Score 2: 74%  Survey Score 3: 74%    Precautions:     Standard following TKA; Cardiac co-morbidities      Subjective   "I have been doing well. It's a little tight today. I have some aggravating pain at night sometimes, but it's not bad. I would say I am doing good with stairs and balance; now I wouldn't want to stand on the one leg too long by itself.".  Pain reported as 0/10. "just tightness today"; Highest recently is a 2/10 mid patella, Lowest 0/10    Objective       Knee Range of Motion    Sitting R 1-0-120* knee AROM              Hip Strength - Planes of Motion   Right Strength Right Pain Left Strength Left  Pain   Flexion (L2) 4+         Extension 4+         ABduction 5         ADduction 4+         Internal Rotation           External Rotation               Knee Strength   Right Strength Right Pain Left Strength Left  Pain   Flexion (S2) 4+         Prone Flexion           Extension (L3) 5                       Treatment:  Therapeutic Exercise  TE 2: Standing: Gastroc stretches using wedge 3x30s  TE 4: Seated: Hamstring stretch 3x30s ea  TE 5: Supine: R " "Hip flexor stretches 3x30s  Therapeutic Activity  TA 1: Treadmill 5% incline @1.8mph x8' & retro @1.2mph x2' (x10' total)  TA 6: Stair navigation using reciprocal pattern with handrail & w/o handrail  TA 7: Stand to floor transfer and vice versa using single UE support for floor to stand (half kneel technique on tumble mat)  TA 8: Body mechanics lifting from thigh-waist level and floor to waist using 33# box (squat technique), carrying load, turning w/load, pushing/pulling load, retreiving light objects from floor (squat & modified golfer's lift)  TA 9: Reviewed kneeling sensitivity progressions as tolerated    Time Entry(in minutes):  Therapeutic Activity Time Entry: 34  Therapeutic Exercise Time Entry: 8    Assessment & Plan   Assessment: The patient reported to physical therapy without present complaint. Ciaran Canchola has been receiving skilled physical therapy services following right total knee arthroplasty, which affected functional mobility/activity tolerance, impaired gait, decreased right knee range of motion, patellar mobility, decreased scar mobility, decreased LE flexibility, impaired strength, impaired gait, quality/walking tolerance, difficulty with step negotiation, impaired sleep, difficulty performing home care and yard work activities, and inability to perform work tasks. The patient has since improved functional knee range of motion, flexibiltiy, strength, stability, and endurance to aide in functional tasks and safety during. Patient is independent and safe with use of hand(s) during floor to stand transfers with slight discomfort with increased flexion at the end of standing to floor transfer. Nonremarkable stair navigation. Gait quality has improved. Range of motion is function; patient minimally lacks termianl knee extension. Incision is healing well; scar is mobile and laying down smooth. Patient was somewhat concerned with slight "clicking" during swinging of lower extremity. Minimal edema " noted with increased activity. Discomfort with Denise sitting; reviewed performing heel slides on other lower extremity and begin piriformis stretching once improved to aide with.  Evaluation/Treatment Tolerance: Patient tolerated treatment well    Patient will continue to benefit from skilled outpatient physical therapy to address the deficits listed in the problem list box on initial evaluation, provide pt/family education and to maximize pt's level of independence in the home and community environment.     Patient's spiritual, cultural, and educational needs considered and patient agreeable to plan of care and goals.     Education  Education was done with Patient. The patient's learning style includes Demonstration and Listening. The patient Demonstrates understanding and Verbalizes understanding.         Updated home exercise program reviewed. Reviewed stair navigation, unlevel surface navigation independently, body mechanics for lifting, pushing, pulling, and carrying. Discussed use of thermal modalities and manual techniques as needed with increased activity due to minimal swelling fluctuations. Reviewed final healing process through home exercise compliance. Reviewed personal progress.       Plan: POC: 2 times Per Week for 4 Weeks. Therapeutic exercise, Therapeutic activities, Neuromuscular re-education, Manual therapy, Gait training, Therapeutic taping, Electrical stimulation - passive/unattended, NMES, Cryotherapy (cold pack), and Thermotherapy (hot pack). Ciaran may be treated by PTA as part of their rehab team.  The patient is to be discharged to established home exercise program under physician care. Patient may return as needed with additional referral from appropriate medical physician.    Goals:   Active       Long Term Goals continued       Improve functional score to > 75% as indicated by FOTO knee survey (Adequate for Care Transition)       Start:  04/02/25    Expected End:  05/30/25             Patient will be independent in HEP.  (Met)       Start:  04/02/25    Expected End:  05/30/25    Resolved:  05/15/25            Short Term Goals       Facilitate improved patellar mobility (Met)       Start:  04/02/25    Expected End:  04/24/25    Resolved:  04/28/25         Facilitate improved scar mobility (Met)       Start:  04/02/25    Expected End:  05/15/25    Resolved:  05/15/25         Patient will navigate at least 6 steps utilizing reciprocating gait pattern with minimal UE support (Met)       Start:  04/02/25    Expected End:  04/24/25    Resolved:  04/28/25         Patient will achieve terminal knee extension on R during gait activities.  (Adequate for Care Transition)       Start:  04/02/25    Expected End:  05/15/25              Resolved       Long Term Goals       Facilitate improved LE flexibility (Met)       Start:  04/02/25    Expected End:  05/30/25    Resolved:  05/15/25         Patient will consistently ambulate over level and uneven surfaces demonstrating equal weight shift, adequate foot clearance and appropriate knee flexion (Met)       Start:  04/02/25    Expected End:  05/30/25    Resolved:  05/15/25         Patient will consistently navigate steps utilizing reciprocating gait pattern with no more than minimal UE support (Met)       Start:  04/02/25    Expected End:  05/30/25    Resolved:  05/05/25         Patient will resume his usual yard work activities (Met)       Start:  04/02/25    Expected End:  05/30/25    Resolved:  05/05/25         Patient will consistently perform sit <> stand transfers with equal weight bearing and good pace.  (Met)       Start:  04/02/25    Expected End:  05/19/25    Resolved:  04/28/25            Long Term Goals updated       Patient will safely perform floor <> stand transfers (Met)       Start:  04/30/25    Expected End:  05/30/25    Resolved:  05/15/25            Short Term Goals Updated       Resolve passive extension deficit (Met)       Start:  04/30/25     Expected End:  05/15/25    Resolved:  05/15/25         Patient will demonstrate proper mechanics for shin to waist lift  (Met)       Start:  04/30/25    Expected End:  05/15/25    Resolved:  05/15/25             Polly Marin, PTA

## 2025-05-15 NOTE — TELEPHONE ENCOUNTER
No care due was identified.  [facility] Embedded Care Due Messages. Reference number: [id number].   5/14/2025 8:45:49 PM CDT

## 2025-05-16 PROBLEM — M25.661 DECREASED ROM OF RIGHT KNEE: Status: RESOLVED | Noted: 2025-04-01 | Resolved: 2025-05-15

## 2025-05-16 PROBLEM — R26.9 GAIT DIFFICULTY: Status: RESOLVED | Noted: 2025-04-01 | Resolved: 2025-05-15

## 2025-05-16 PROBLEM — Z74.09 IMPAIRED FUNCTIONAL MOBILITY AND ACTIVITY TOLERANCE: Status: RESOLVED | Noted: 2025-04-01 | Resolved: 2025-05-15

## 2025-05-16 NOTE — PROGRESS NOTES
Outpatient Rehab    Physical Therapy Discharge    Patient Name: Ciaran Canchola  MRN: [id number]  YOB: 1950  Encounter Date: 5/15/2025    Therapy Diagnosis:   Encounter Diagnoses   Name Primary?    Impaired functional mobility and activity tolerance Yes    Gait difficulty     Decreased ROM of right knee      Physician: Tay Alonso,*    Physician Orders: Eval and Treat  Medical Diagnosis: S/P total knee arthroplasty, right    Visit # / Visits Authorized:  13 / 20  Insurance Authorization Period: 4/2/2025 to 7/10/2025  Date of Evaluation: 3/31/2025  Plan of Care Certification: 4/30/2025 to 5/30/2025      PT/PTA: PTA   Number of PTA visits since last PT visit:4  Time In: 1106 (treated by PTA this date)   Time Out: 1151  Total Time (in minutes): 45   Total Billable Time (in minutes): 42    FOTO:  Intake Score: 59%  Survey Score 2: 74%  Survey Score 3: 74%    Precautions:  Standard following TKA; Cardiac co-morbidities     Subjective    Returning to work (for a conference) next week.    Objective       Knee Swelling  Location of Measurement Right  (cm) Left  (cm)   20 cm Above Joint Line       10 cm Vastus Medialis Oblique       At Joint Line       15 cm Below Joint Line       No swelling noted this date         Hip Range of Motion    B hips grossly WNL except R hip external rotation which remains minimally limited.     Knee Range of Motion   Right Knee   Active (deg) Passive (deg) Pain   Flexion 120       Extension 1           Measured in sitting per PTA note              Hip Strength - Planes of Motion   Right Strength Right Pain Left Strength Left  Pain   Flexion (L2) 4+         Extension 4+         ABduction 5         ADduction 4+         Internal Rotation 4+         External Rotation 4+             Knee Strength   Right Strength Right Pain Left Strength Left  Pain   Flexion (S2) 4+         Prone Flexion           Extension (L3) 5                     Treatment:  Treated by PTA this  date. See PTA note    Assessment & Plan   Assessment: Ciaran has made steady progress in PT with good improviement in ROM and strength of R LE, decreased swelling of R knee, and improved R LE flexibility. He demonstrates improved gait quality, increased walking tolerance, ability to navigate stairs using reciprocating pattern without use of handrail, and improved functional mobility. He is returning to work next week.  He is independent in HEP.  Thus patient is ready for discharge.     Patient's spiritual, cultural, and educational needs considered and patient agreeable to plan of care and goals.     Education             Per PTA note     Plan: Discharge patient from skilled PT services. He is to continue HEP to maintain gains achieved.    Goals:   Active       Long Term Goals continued       Improve functional score to > 75% as indicated by FOTO knee survey (Adequate for Care Transition)       Start:  04/02/25    Expected End:  05/30/25            Patient will be independent in HEP.  (Met)       Start:  04/02/25    Expected End:  05/30/25    Resolved:  05/16/25            Short Term Goals       Facilitate improved patellar mobility (Met)       Start:  04/02/25    Expected End:  04/24/25    Resolved:  04/28/25         Facilitate improved scar mobility (Met)       Start:  04/02/25    Expected End:  05/15/25    Resolved:  05/16/25         Patient will navigate at least 6 steps utilizing reciprocating gait pattern with minimal UE support (Met)       Start:  04/02/25    Expected End:  04/24/25    Resolved:  04/28/25         Patient will achieve terminal knee extension on R during gait activities.  (Adequate for Care Transition)       Start:  04/02/25    Expected End:  05/15/25              Resolved       Long Term Goals       Facilitate improved LE flexibility (Met)       Start:  04/02/25    Expected End:  05/30/25    Resolved:  05/16/25         Patient will consistently ambulate over level and uneven surfaces  demonstrating equal weight shift, adequate foot clearance and appropriate knee flexion (Met)       Start:  04/02/25    Expected End:  05/30/25    Resolved:  05/16/25         Patient will consistently navigate steps utilizing reciprocating gait pattern with no more than minimal UE support (Met)       Start:  04/02/25    Expected End:  05/30/25    Resolved:  05/05/25         Patient will resume his usual yard work activities (Met)       Start:  04/02/25    Expected End:  05/30/25    Resolved:  05/05/25         Patient will consistently perform sit <> stand transfers with equal weight bearing and good pace.  (Met)       Start:  04/02/25    Expected End:  05/19/25    Resolved:  04/28/25            Long Term Goals updated       Patient will safely perform floor <> stand transfers (Met)       Start:  04/30/25    Expected End:  05/30/25    Resolved:  05/16/25            Short Term Goals Updated       Resolve passive extension deficit (Met)       Start:  04/30/25    Expected End:  05/15/25    Resolved:  05/16/25         Patient will demonstrate proper mechanics for shin to waist lift  (Met)       Start:  04/30/25    Expected End:  05/15/25    Resolved:  05/16/25             Brielle Starr PT

## 2025-05-19 ENCOUNTER — TELEPHONE (OUTPATIENT)
Dept: FAMILY MEDICINE | Facility: CLINIC | Age: 75
End: 2025-05-19
Payer: MEDICARE

## 2025-05-19 NOTE — TELEPHONE ENCOUNTER
[employer] PA Approved    Approved  Prior Authorization Portal   Prior authorization approved  Payer: [facility] - Medicare Case ID: [id number]    [phone]  Note from payer: PA Case: [id number], Status: Approved, Coverage Starts on: 5/19/2025 3:07:16 PM, Coverage Ends on: 5/19/2025 3:07:16 PM. Questions? Contact [phone].  Approval Details    Authorized from May 19, 2025 to May 19, 2025  Electronic appeal: Not supported  View History

## 2025-05-28 DIAGNOSIS — E11.9 TYPE 2 DIABETES MELLITUS WITHOUT COMPLICATION: ICD-10-CM

## 2025-07-14 DIAGNOSIS — Z96.651 S/P TOTAL KNEE ARTHROPLASTY, RIGHT: Primary | ICD-10-CM

## 2025-07-16 ENCOUNTER — HOSPITAL ENCOUNTER (OUTPATIENT)
Dept: RADIOLOGY | Facility: HOSPITAL | Age: 75
Discharge: HOME | End: 2025-07-16
Attending: ORTHOPAEDIC SURGERY
Payer: MEDICARE

## 2025-07-16 ENCOUNTER — OFFICE VISIT (OUTPATIENT)
Dept: ORTHOPEDICS | Facility: CLINIC | Age: 75
End: 2025-07-16
Payer: MEDICARE

## 2025-07-16 VITALS — HEIGHT: 69 IN | BODY MASS INDEX: 38.2 KG/M2 | WEIGHT: 257.94 LBS

## 2025-07-16 DIAGNOSIS — Z96.651 S/P TOTAL KNEE ARTHROPLASTY, RIGHT: ICD-10-CM

## 2025-07-16 DIAGNOSIS — Z96.651 S/P TOTAL KNEE ARTHROPLASTY, RIGHT: Primary | ICD-10-CM

## 2025-07-16 PROCEDURE — 1125F AMNT PAIN NOTED PAIN PRSNT: CPT | Mod: CPTII,S$GLB,, | Performed by: ORTHOPAEDIC SURGERY

## 2025-07-16 PROCEDURE — 3288F FALL RISK ASSESSMENT DOCD: CPT | Mod: CPTII,S$GLB,, | Performed by: ORTHOPAEDIC SURGERY

## 2025-07-16 PROCEDURE — 99214 OFFICE O/P EST MOD 30 MIN: CPT | Mod: S$GLB,,, | Performed by: ORTHOPAEDIC SURGERY

## 2025-07-16 PROCEDURE — 1159F MED LIST DOCD IN RCRD: CPT | Mod: CPTII,S$GLB,, | Performed by: ORTHOPAEDIC SURGERY

## 2025-07-16 PROCEDURE — 1101F PT FALLS ASSESS-DOCD LE1/YR: CPT | Mod: CPTII,S$GLB,, | Performed by: ORTHOPAEDIC SURGERY

## 2025-07-16 PROCEDURE — 73564 X-RAY EXAM KNEE 4 OR MORE: CPT | Mod: TC,PO,RT

## 2025-07-16 PROCEDURE — 73564 X-RAY EXAM KNEE 4 OR MORE: CPT | Mod: 26,RT,, | Performed by: RADIOLOGY

## 2025-07-16 PROCEDURE — 1160F RVW MEDS BY RX/DR IN RCRD: CPT | Mod: CPTII,S$GLB,, | Performed by: ORTHOPAEDIC SURGERY

## 2025-07-16 PROCEDURE — 3008F BODY MASS INDEX DOCD: CPT | Mod: CPTII,S$GLB,, | Performed by: ORTHOPAEDIC SURGERY

## 2025-07-16 PROCEDURE — 4010F ACE/ARB THERAPY RXD/TAKEN: CPT | Mod: CPTII,S$GLB,, | Performed by: ORTHOPAEDIC SURGERY

## 2025-07-16 PROCEDURE — 3072F LOW RISK FOR RETINOPATHY: CPT | Mod: CPTII,S$GLB,, | Performed by: ORTHOPAEDIC SURGERY

## 2025-07-16 PROCEDURE — 73562 X-RAY EXAM OF KNEE 3: CPT | Mod: 26,LT,, | Performed by: RADIOLOGY

## 2025-07-16 PROCEDURE — 99999 PR PBB SHADOW E&M-EST. PATIENT-LVL III: CPT | Mod: PBBFAC,,, | Performed by: ORTHOPAEDIC SURGERY

## 2025-07-16 NOTE — PROGRESS NOTES
Subjective:      Patient ID: Ciaran Canchola is a 74 y.o. male.    Chief Complaint: Post-op Evaluation of the Right Knee (DOS: 03/12/2025)    HPI  The patient returns 3 months status post right total knee replacement.  His pain in his much improved.  He feels like he is progressing well with the therapy.  He does intermittently noticed a clicking sensation in the knee but that it is not painful.  ROS      Objective:    Ortho Exam     Constitutional:   Patient is alert  and oriented in no acute distress  HEENT:  normocephalic atraumatic; PERRL EOMI  Neck:  Supple without adenopathy  Cardiovascular:  Normal rate and rhythm  Pulmonary:  Normal respiratory effort normal chest wall expansion  Abdominal:  Nonprotuberant nondistended  Musculoskeletal:  Steady very minimally antalgic but comfortable appearing gait  Well-healed surgical incision seems to have good patellar tracking  No gross instability of his knee does have a small effusion no increased warmth or erythema  Neurological:  No focal defect; cranial nerves 2-12 grossly intact  Psychiatric/behavioral:  Mood and behavior normal      X-ray Knee Ortho Right with Flexion (XPD)  Narrative: EXAMINATION:  XR KNEE ORTHO RIGHT WITH FLEXION (XPD)    CLINICAL HISTORY:  Presence of right artificial knee joint    TECHNIQUE:  AP standing as well as PA flexion standing and Merchant views of both knees were performed.  A lateral view of the right knee is also performed.    COMPARISON:  Right knee x-ray of May 9, 2025    FINDINGS:  The patient has undergone a right total knee arthroplasty with metallic femoral and tibial components in good position.  Lucency around the prosthesis consistent with osteomyelitis or loosening is not seen.  A fracture or joint effusion is not seen.  Impression: Status post right total knee arthroplasty, no acute findings    Electronically signed by: Tc Nava MD  Date:    07/16/2025  Time:    09:23       My Radiographs  Findings:    Radiographs show no evidence of loosening or osteolysis well-positioned components  Assessment:       Encounter Diagnosis   Name Primary?    S/P total knee arthroplasty, right Yes         Plan:        I have discussed medical condition treatment options with the patient at length.  He seems to be progressing nicely.  He may advance activities as tolerated.  He ask questions related to his ability to golf and I think he may turned to golf but I have suggested caution with the uneven ground traversing the sides of greens or T boxes etc..  Continue with the general knee rehab and follow up with me in 3 months sooner if any questions or problems.        Past Medical History:   Diagnosis Date    Depression     Diabetes mellitus     Hyperlipidemia     Hypertension     Hypertensive retinopathy of both eyes 11/20/2023     Past Surgical History:   Procedure Laterality Date    CATARACT EXTRACTION W/  INTRAOCULAR LENS IMPLANT Left 2/7/2024    Procedure: CEIOL OS;  Surgeon: Donato Godoy MD;  Location: [facility] OR;  Service: Ophthalmology;  Laterality: Left;    CATARACT EXTRACTION W/  INTRAOCULAR LENS IMPLANT Right 3/13/2024    Procedure: CEIOL OD;  Surgeon: Donato Godoy MD;  Location: [facility] OR;  Service: Ophthalmology;  Laterality: Right;    CHOLECYSTECTOMY      COLONOSCOPY  2018    RTC in 5 years. done in [place]    KNEE ARTHROPLASTY Right 3/12/2025    Procedure: ARTHROPLASTY, KNEE;  Surgeon: Tay Alonos MD;  Location: [facility] OR;  Service: Orthopedics;  Laterality: Right;    TONSILLECTOMY      UMBILICAL HERNIA REPAIR         Current Medications[1]    Review of patient's allergies indicates:  No Known Allergies    Family History   Problem Relation Name Age of Onset    ALS Mother      Heart disease Father      Heart disease Sister       Social History     Occupational History    Not on file   Tobacco Use    Smoking status: Former     Current packs/day: 0.00     Types: Cigarettes     Quit date:  1985     Years since quittin.5     Passive exposure: Past    Smokeless tobacco: Never    Tobacco comments:     Quit  35 years ago   Substance and Sexual Activity    Alcohol use: Yes     Comment: SOCIALLY    Drug use: Never    Sexual activity: Yes     Partners: Female            [1]   Current Outpatient Medications:     alcohol swabs (ALCOHOL PREP) PadM, Apply 2 each topically once daily., Disp: 200 each, Rfl: 3    blood sugar diagnostic (ACCU-CHEK GUIDE TEST STRIPS) Strp, Inject 2 each into the skin 2 (two) times a day., Disp: 200 strip, Rfl: 3    blood-glucose meter (ACCU-CHEK GUIDE ME GLUCOSE MTR) Misc, Inject 1 each into the skin once daily., Disp: 1 each, Rfl: 0    empagliflozin (JARDIANCE) 10 mg tablet, TAKE 1 TABLET ONE TIME DAILY, Disp: 90 tablet, Rfl: 0    esomeprazole (NEXIUM) 40 MG capsule, TAKE 1 CAPSULE (40 MG TOTAL) BY MOUTH BEFORE BREAKFAST., Disp: 90 capsule, Rfl: 2    lancets (ACCU-CHEK SOFTCLIX LANCETS) Misc, TEST BLOOD SUGAR TWO TIMES DAILY, Disp: 200 each, Rfl: 3    lisinopriL-hydrochlorothiazide (PRINZIDE,ZESTORETIC) 20-25 mg Tab, TAKE 1 TABLET EVERY DAY, Disp: 90 tablet, Rfl: 0    meloxicam (MOBIC) 15 MG tablet, TAKE 1 TABLET ONE TIME DAILY, Disp: 90 tablet, Rfl: 0    rosuvastatin (CRESTOR) 5 MG tablet, TAKE 1 TABLET EVERY EVENING, Disp: 90 tablet, Rfl: 2    sertraline (ZOLOFT) 50 MG tablet, Take 2 tablets by mouth every night, Disp: 180 tablet, Rfl: 1    HYDROcodone-acetaminophen (NORCO) 5-325 mg per tablet, Take 1 tablet by mouth every 6 (six) hours as needed for Pain., Disp: 30 tablet, Rfl: 0

## 2025-08-06 DIAGNOSIS — I10 ESSENTIAL HYPERTENSION: ICD-10-CM

## 2025-08-06 RX ORDER — LISINOPRIL AND HYDROCHLOROTHIAZIDE 20; 25 MG/1; MG/1
1 TABLET ORAL
Qty: 90 TABLET | Refills: 0 | Status: SHIPPED | OUTPATIENT
Start: 2025-08-06

## 2025-08-06 NOTE — TELEPHONE ENCOUNTER
Refill Decision Note   Ciaran Canchola  is requesting a refill authorization.  Brief Assessment and Rationale for Refill:  Approve     Medication Therapy Plan:         Comments:     Note composed:3:11 PM 08/06/2025

## 2025-08-06 NOTE — TELEPHONE ENCOUNTER
No care due was identified.  [facility] Embedded Care Due Messages. Reference number: [id number].   8/06/2025 10:24:13 AM CDT

## 2025-08-07 DIAGNOSIS — F33.42 RECURRENT MAJOR DEPRESSIVE DISORDER, IN FULL REMISSION: ICD-10-CM

## 2025-08-07 RX ORDER — SERTRALINE HYDROCHLORIDE 50 MG/1
TABLET, FILM COATED ORAL
Qty: 180 TABLET | Refills: 1 | Status: SHIPPED | OUTPATIENT
Start: 2025-08-07

## 2025-08-07 NOTE — TELEPHONE ENCOUNTER
Refill Routing Note   Medication(s) are not appropriate for processing by [facility] for the following reason(s):        New or recently adjusted medication    [facility] action(s):  Defer             Appointments  past 12m or future 3m with PCP    Date Provider   Last Visit   11/20/2024 Obie Argueta MD   Next Visit   8/25/2025 Obie Argueta MD   ED visits in past 90 days: 0        Note composed:9:29 AM 08/07/2025

## 2025-08-08 DIAGNOSIS — E78.2 MIXED HYPERLIPIDEMIA: ICD-10-CM

## 2025-08-08 RX ORDER — ROSUVASTATIN CALCIUM 5 MG/1
5 TABLET, COATED ORAL NIGHTLY
Qty: 90 TABLET | Refills: 1 | Status: SHIPPED | OUTPATIENT
Start: 2025-08-08

## 2025-08-08 NOTE — TELEPHONE ENCOUNTER
Refill Decision Note   Ciaran Canchola  is requesting a refill authorization.  Brief Assessment and Rationale for Refill:  Approve     Medication Therapy Plan:        Comments:     Note composed:9:15 AM 08/08/2025

## (undated) DEVICE — DRESSING TRANS 4X4 TEGADERM

## (undated) DEVICE — TAPE CURAD ELAS FOAM 4INX5.5YD

## (undated) DEVICE — WRAP KNEE ACCU THERM GEL PACK

## (undated) DEVICE — ALCOHOL RUBBING 70% ISO 4OZ

## (undated) DEVICE — BANDAGE MATRIX HK LOOP 6IN 5YD

## (undated) DEVICE — TAPE SILK 3IN

## (undated) DEVICE — PACK CUSTOM EYE KIT

## (undated) DEVICE — SHIELD EYE PLASTIC 3100G

## (undated) DEVICE — DRESSING GAUZE XEROFORM 5X9

## (undated) DEVICE — KIT TOTAL HIP BN PREPARATION

## (undated) DEVICE — GLOVE SENSICARE PI ALOE 7.5

## (undated) DEVICE — DRAPE OPTHALMIC W/POUCH

## (undated) DEVICE — SUT STRATAFIX VIOL 2-0 TP CT-2

## (undated) DEVICE — LINER SUCTION 3000CC

## (undated) DEVICE — PADDING CAST SPECIALIST 6X4YD

## (undated) DEVICE — DRAPE STERI INSTRUMENT 1018

## (undated) DEVICE — DRAPE THREE-QTR REINF 53X77IN

## (undated) DEVICE — GLOVE SENSICARE PI GRN 7

## (undated) DEVICE — KIT POST CATARACT SURGERY

## (undated) DEVICE — MANIFOLD 4 PORT

## (undated) DEVICE — GLOVE SENSICARE PI GRN 8

## (undated) DEVICE — PACK CUSTOM UNIV BASIN SLI

## (undated) DEVICE — SOL BETADINE 5%

## (undated) DEVICE — SOL NACL IRR 1000ML BTL

## (undated) DEVICE — CLOSURE SKIN STERI STRIP 1/2X4

## (undated) DEVICE — GLOVE BIOGEL PI MICRO SZ 7

## (undated) DEVICE — PACK SIRUS BASIC V SURG STRL

## (undated) DEVICE — GLOVE SENSICARE PI ALOE 6

## (undated) DEVICE — SUT STRATAFIX SPRL PS-2 3-0

## (undated) DEVICE — APPLICATOR CHLORAPREP ORN 26ML

## (undated) DEVICE — SUT 0 VICRYL / CT-1

## (undated) DEVICE — SYS LABEL CORRECT MED

## (undated) DEVICE — TOWEL OR DISP STRL BLUE 4/PK

## (undated) DEVICE — TAPE SILICONE 1 X1 1/2

## (undated) DEVICE — PACK INTREPID PLUS 45DEG .9MM

## (undated) DEVICE — ELECTRODE MEGADYNE RETURN DUAL

## (undated) DEVICE — SOL NACL IRR 3000ML

## (undated) DEVICE — CYSTOTOME IRRIG 24G 13MM

## (undated) DEVICE — SUT 2-0 VICRYL FS-1 UND

## (undated) DEVICE — INTERPULSE SET

## (undated) DEVICE — SYR LUER LOCK 1CC

## (undated) DEVICE — TIP I & A

## (undated) DEVICE — BANDAGE ESMARK ELASTIC ST 6X9

## (undated) DEVICE — BNDG COFLEX FOAM LF2 ST 6X5YD

## (undated) DEVICE — TOGA FLYTE PEEL AWAY XLARGE

## (undated) DEVICE — KNIFE ANGLE 1MM

## (undated) DEVICE — BOWL STERILE LARGE 32OZ

## (undated) DEVICE — PACK EXTREMITY ORTHOMAX

## (undated) DEVICE — NDL BLUNT W/O FILTER 18GX1.5IN

## (undated) DEVICE — YANKAUER FLEX NO VENT HI CAP

## (undated) DEVICE — ADHESIVE MASTISOL VIAL 48/BX

## (undated) DEVICE — TOURNIQUET SB QC DP 34X4IN

## (undated) DEVICE — DRAPE U SPLIT SHEET 54X76IN

## (undated) DEVICE — NDL FLTR 5MCRN BLNT TIP 18GX1

## (undated) DEVICE — SLEEVE SCD EXPRESS KNEE MEDIUM

## (undated) DEVICE — BLADE SURG CARBON STEEL #10

## (undated) DEVICE — GLOVE SENSICARE PI ALOE 6.5

## (undated) DEVICE — SUT ETHIBOND EXCEL 2 V37 30

## (undated) DEVICE — CONTAINER SPECIMEN OR STER 4OZ

## (undated) DEVICE — GLOVE SENSICARE PI GRN 6.5

## (undated) DEVICE — GLOVE SENSICARE PI GRN 6

## (undated) DEVICE — COVER TABLE 44X90 STERILE

## (undated) DEVICE — BLADE SAG DUAL 18MMX1.27MMX90M

## (undated) DEVICE — DRAPE INCISE IOBAN 2 23X33IN

## (undated) DEVICE — BLADE SURG BVL ANG COAX 2.4MM

## (undated) DEVICE — SPONGE BULKEE II ABSRB 6X6.75

## (undated) DEVICE — BNDG COFLEX FOAM LF2 ST 4X5YD

## (undated) DEVICE — STRAP OR TABLE 5IN X 72IN